# Patient Record
Sex: FEMALE | Race: WHITE | URBAN - METROPOLITAN AREA
[De-identification: names, ages, dates, MRNs, and addresses within clinical notes are randomized per-mention and may not be internally consistent; named-entity substitution may affect disease eponyms.]

---

## 2018-01-04 ENCOUNTER — APPOINTMENT (RX ONLY)
Dept: URBAN - METROPOLITAN AREA CLINIC 128 | Facility: CLINIC | Age: 63
Setting detail: DERMATOLOGY
End: 2018-01-04

## 2018-01-04 DIAGNOSIS — D69.2 OTHER NONTHROMBOCYTOPENIC PURPURA: ICD-10-CM

## 2018-01-04 DIAGNOSIS — Z71.89 OTHER SPECIFIED COUNSELING: ICD-10-CM

## 2018-01-04 DIAGNOSIS — D22 MELANOCYTIC NEVI: ICD-10-CM

## 2018-01-04 DIAGNOSIS — L57.0 ACTINIC KERATOSIS: ICD-10-CM

## 2018-01-04 DIAGNOSIS — L82.1 OTHER SEBORRHEIC KERATOSIS: ICD-10-CM

## 2018-01-04 DIAGNOSIS — I83.9 ASYMPTOMATIC VARICOSE VEINS OF LOWER EXTREMITIES: ICD-10-CM

## 2018-01-04 DIAGNOSIS — I78.8 OTHER DISEASES OF CAPILLARIES: ICD-10-CM

## 2018-01-04 DIAGNOSIS — L81.4 OTHER MELANIN HYPERPIGMENTATION: ICD-10-CM

## 2018-01-04 DIAGNOSIS — I78.1 NEVUS, NON-NEOPLASTIC: ICD-10-CM

## 2018-01-04 DIAGNOSIS — L82.0 INFLAMED SEBORRHEIC KERATOSIS: ICD-10-CM

## 2018-01-04 PROBLEM — D22.39 MELANOCYTIC NEVI OF OTHER PARTS OF FACE: Status: ACTIVE | Noted: 2018-01-04

## 2018-01-04 PROBLEM — D22.61 MELANOCYTIC NEVI OF RIGHT UPPER LIMB, INCLUDING SHOULDER: Status: ACTIVE | Noted: 2018-01-04

## 2018-01-04 PROBLEM — I83.93 ASYMPTOMATIC VARICOSE VEINS OF BILATERAL LOWER EXTREMITIES: Status: ACTIVE | Noted: 2018-01-04

## 2018-01-04 PROBLEM — D22.5 MELANOCYTIC NEVI OF TRUNK: Status: ACTIVE | Noted: 2018-01-04

## 2018-01-04 PROCEDURE — 17003 DESTRUCT PREMALG LES 2-14: CPT

## 2018-01-04 PROCEDURE — ? OTHER

## 2018-01-04 PROCEDURE — ? COUNSELING

## 2018-01-04 PROCEDURE — 99214 OFFICE O/P EST MOD 30 MIN: CPT | Mod: 25

## 2018-01-04 PROCEDURE — 17000 DESTRUCT PREMALG LESION: CPT | Mod: 59

## 2018-01-04 PROCEDURE — 17110 DESTRUCTION B9 LES UP TO 14: CPT

## 2018-01-04 PROCEDURE — ? PATIENT SPECIFIC COUNSELING

## 2018-01-04 PROCEDURE — ? LIQUID NITROGEN

## 2018-01-04 ASSESSMENT — LOCATION ZONE DERM
LOCATION ZONE: LEG
LOCATION ZONE: TRUNK
LOCATION ZONE: AXILLAE
LOCATION ZONE: FACE
LOCATION ZONE: ARM

## 2018-01-04 ASSESSMENT — LOCATION SIMPLE DESCRIPTION DERM
LOCATION SIMPLE: LEFT UPPER BACK
LOCATION SIMPLE: RIGHT AXILLARY VAULT
LOCATION SIMPLE: RIGHT UPPER BACK
LOCATION SIMPLE: INFERIOR FOREHEAD
LOCATION SIMPLE: LEFT CLAVICULAR SKIN
LOCATION SIMPLE: LEFT PRETIBIAL REGION
LOCATION SIMPLE: CHEST
LOCATION SIMPLE: RIGHT CHEEK
LOCATION SIMPLE: LEFT LOWER BACK
LOCATION SIMPLE: LEFT THIGH
LOCATION SIMPLE: RIGHT UPPER ARM
LOCATION SIMPLE: LEFT FOREARM
LOCATION SIMPLE: LEFT CHEEK
LOCATION SIMPLE: RIGHT POSTERIOR THIGH
LOCATION SIMPLE: RIGHT THIGH
LOCATION SIMPLE: RIGHT PRETIBIAL REGION
LOCATION SIMPLE: LEFT BUTTOCK
LOCATION SIMPLE: LEFT EYEBROW
LOCATION SIMPLE: ABDOMEN
LOCATION SIMPLE: RIGHT LOWER BACK
LOCATION SIMPLE: RIGHT CALF
LOCATION SIMPLE: RIGHT FOREARM
LOCATION SIMPLE: LEFT POSTERIOR THIGH
LOCATION SIMPLE: LEFT CALF
LOCATION SIMPLE: UPPER BACK

## 2018-01-04 ASSESSMENT — LOCATION DETAILED DESCRIPTION DERM
LOCATION DETAILED: LEFT CLAVICULAR SKIN
LOCATION DETAILED: RIGHT DISTAL DORSAL FOREARM
LOCATION DETAILED: INFERIOR THORACIC SPINE
LOCATION DETAILED: RIGHT CENTRAL MALAR CHEEK
LOCATION DETAILED: RIGHT PROXIMAL CALF
LOCATION DETAILED: SUPERIOR THORACIC SPINE
LOCATION DETAILED: RIGHT VENTRAL PROXIMAL FOREARM
LOCATION DETAILED: UPPER STERNUM
LOCATION DETAILED: RIGHT PROXIMAL DORSAL FOREARM
LOCATION DETAILED: LEFT LATERAL BUTTOCK
LOCATION DETAILED: LEFT MEDIAL MALAR CHEEK
LOCATION DETAILED: LEFT LATERAL EYEBROW
LOCATION DETAILED: LEFT DISTAL POSTERIOR THIGH
LOCATION DETAILED: RIGHT SUPERIOR UPPER BACK
LOCATION DETAILED: RIGHT INFERIOR CENTRAL MALAR CHEEK
LOCATION DETAILED: RIGHT DISTAL POSTERIOR THIGH
LOCATION DETAILED: LEFT PROXIMAL PRETIBIAL REGION
LOCATION DETAILED: RIGHT PROXIMAL PRETIBIAL REGION
LOCATION DETAILED: RIGHT ANTERIOR PROXIMAL UPPER ARM
LOCATION DETAILED: EPIGASTRIC SKIN
LOCATION DETAILED: LEFT MEDIAL SUPERIOR CHEST
LOCATION DETAILED: RIGHT SUPERIOR MEDIAL MIDBACK
LOCATION DETAILED: RIGHT MEDIAL UPPER BACK
LOCATION DETAILED: INFERIOR MID FOREHEAD
LOCATION DETAILED: LEFT CENTRAL MALAR CHEEK
LOCATION DETAILED: LEFT ANTERIOR DISTAL THIGH
LOCATION DETAILED: LEFT PROXIMAL DORSAL FOREARM
LOCATION DETAILED: XIPHOID
LOCATION DETAILED: RIGHT LATERAL ABDOMEN
LOCATION DETAILED: LEFT VENTRAL PROXIMAL FOREARM
LOCATION DETAILED: LEFT DISTAL CALF
LOCATION DETAILED: LEFT INFERIOR UPPER BACK
LOCATION DETAILED: LEFT DISTAL DORSAL FOREARM
LOCATION DETAILED: LEFT INFERIOR LATERAL MIDBACK
LOCATION DETAILED: RIGHT ANTERIOR DISTAL THIGH
LOCATION DETAILED: RIGHT AXILLARY VAULT
LOCATION DETAILED: RIGHT PROXIMAL LATERAL POSTERIOR THIGH

## 2018-01-04 NOTE — PROCEDURE: OTHER
Note Text (......Xxx Chief Complaint.): This diagnosis correlates with the
Other (Free Text): Discussed pulse dye laser \\nConsultation
Detail Level: Simple

## 2018-01-04 NOTE — PROCEDURE: LIQUID NITROGEN
Render Post-Care Instructions In Note?: no
Consent: The patient's consent was obtained including but not limited to risks of crusting, scabbing, blistering, scarring, darker or lighter pigmentary change, recurrence, incomplete removal and infection.
Medical Necessity Information: It is in your best interest to select a reason for this procedure from the list below. All of these items fulfill various CMS LCD requirements except the new and changing color options.
Detail Level: Detailed
Medical Necessity Clause: This procedure was medically necessary because the lesions that were treated were:
Number Of Freeze-Thaw Cycles: 3 freeze-thaw cycles
Post-Care Instructions: I reviewed with the patient in detail post-care instructions. Patient is to wear sunprotection, and avoid picking at any of the treated lesions. Pt may apply Vaseline to crusted or scabbing areas.
Duration Of Freeze Thaw-Cycle (Seconds): 0
Number Of Freeze-Thaw Cycles: 2 freeze-thaw cycles
Detail Level: Simple

## 2018-01-04 NOTE — PROCEDURE: PATIENT SPECIFIC COUNSELING
Discussed with patient that when I see pronounced telangiectasia I become concerned about systemic disorders like dermatomyositis. She denies muscle weakness upper body but does have muscle stiffness on her hands. Her PCP up [facility] did do NALLELY and it was positive she was told to possibly have lupus. She has not seen a rheumatologist yet.  Recommend that she see rheumatologist.
Detail Level: Zone

## 2019-08-05 ENCOUNTER — EVALUATION (OUTPATIENT)
Dept: PHYSICAL THERAPY | Facility: CLINIC | Age: 64
End: 2019-08-05
Payer: COMMERCIAL

## 2019-08-05 VITALS — SYSTOLIC BLOOD PRESSURE: 140 MMHG | DIASTOLIC BLOOD PRESSURE: 80 MMHG

## 2019-08-05 DIAGNOSIS — R26.89 BALANCE DISORDER: Primary | ICD-10-CM

## 2019-08-05 PROCEDURE — 97162 PT EVAL MOD COMPLEX 30 MIN: CPT | Performed by: PHYSICAL THERAPIST

## 2019-08-05 NOTE — LETTER
2019    Sophia Mendoza  [address], [place] [address]    Patient: Keyla Balderas   YOB: 1955   Date of Visit: 2019     Encounter Diagnosis     ICD-10-CM    1  Balance disorder R26 89        Dear Dr Venessa Sheffield:    Thank you for your recent referral of Keyla Balderas  Please review the attached evaluation summary from [facility] recent visit  Please verify that you agree with the plan of care by signing the attached order  If you have any questions or concerns, please do not hesitate to call  I sincerely appreciate the opportunity to share in the care of one of your patients and hope to have another opportunity to work with you in the near future  Sincerely,    Betty Varner, PT      Referring Provider:      I certify that I have read the below Plan of Care and certify the need for these services furnished under this plan of treatment while under my care  Sophia Mendoza  [address], [place] [address]  VIA Facsimile: [phone]          PT Evaluation     Today's date: 2019  Patient name: Keyla Balderas  : 1955  MRN: [id number]  Referring provider: Marge Rene  Dx:   Encounter Diagnosis     ICD-10-CM    1  Balance disorder R26 89                   Assessment  Assessment details: Keyla Balderas is a 60 y o  female who presents with pain, decreased strength, decreased sensation, impaired sensation and ambulatory dysfunction  Due to these impairments, Patient has difficulty performing a/iadls, recreational activities and engaging in social activities  Patient's clinical presentation is consistent with their referring diagnosis  Patient would benefit from skilled physical therapy to address their aforementioned impairments, improve their level of function and to improve their overall quality of life    Impairments: abnormal coordination, abnormal gait, abnormal muscle firing, abnormal or restricted ROM, abnormal movement, activity intolerance, impaired balance, impaired physical strength, lacks appropriate home exercise program, pain with function, safety issue, weight-bearing intolerance, poor posture  and poor body mechanics  Understanding of Dx/Px/POC: good   Prognosis: fair    Goals  Short Term Goals: to be achieved in 4 weeks  1) Patient to be independent with basic HEP  2) Decrease pain at it's worst to 5/10 on VAS  3) Increase LE strength by 1/2 MMT grade in all deficient planes  4) Patient to negotiate steps with a reciprocal pattern with use of HR  5) Increase ambulatory tolerance by 10 minutes  Long Term Goals: to be achieved by discharge  1) FOTO equal to or greater than predicted  2) Patient to be independent with comprehensive HEP  3) Abolish pain for improved quality of life  4) Increase LE strength to 5/5 MMT grade in all planes to improve A/IADLS  5) Patient to negotiate steps with a reciprocal pattern with use of Hr  6) Increase ambulatory tolerance to 30 minutes  Plan  Patient would benefit from: skilled PT  Planned modality interventions: biofeedback, cryotherapy, hydrotherapy, TENS, unattended electrical stimulation and low level laser therapy  Planned therapy interventions: activity modification, ADL retraining, balance, balance/weight bearing training, behavior modification, body mechanics training, functional ROM exercises, gait training, home exercise program, IADL retraining, joint mobilization, manual therapy, massage, neuromuscular re-education, patient education, strengthening, stretching, therapeutic activities, therapeutic exercise and transfer training  Frequency: 2x week  Duration in weeks: 12  Treatment plan discussed with: patient and family        Subjective Evaluation    History of Present Illness  Mechanism of injury: Pt is here today due to wounds that are on her  Foot  She was in an induced coma because she had a heart attack and pnemuonia   And during that time she had a restless leg syndrome which lead to wounds on both of her heals  She did have a right foot amputation in her toes back in March  Her podiatrist has cleared her to walk, and she now needs to begin doing this  She has been walking short distances at home, but is still having a lot of pain in her heels upon weightbearing, and also feels very shaky and weak  She reports that her balance is also off  Prior to this, she was limited with walking long distances due to her history of PAD  Recurrent probem    Quality of life: fair    Pain  At worst pain ratin    Patient Goals  Patient goal: To walk again        Objective     Strength/Myotome Testing     Left Hip   Planes of Motion   Flexion: 4    Right Hip   Planes of Motion   Flexion: 4    Left Knee   Flexion: 4  Extension: 4    Right Knee   Flexion: 4  Extension: 4    Left Ankle/Foot   Dorsiflexion: 3 (did not perform resistance due to wounds)    Right Ankle/Foot   Dorsiflexion: 3 (did not perform resistance due to wounds)    Additional Strength Details  Gait- lack of push off, decreased step and stride length  Ambulates with wc follow  Sit to stand- heavy reliance on rich UE's    Wound- did not inspect as patient reported just putting new  Bandages on them    Rich pitting edema noted; 26cm L (around malleolus); 27 cm R (around malleolus)              Precautions: hx of MI, DM type 1, PVD, fall risk (recent R toe amputation), open foot wounds      Manual                                                                                   Exercise Diary              Repeated sit to stand             Gait training             Standing hip 3 way             Side stepping             Mini squats             Pre-gait activities                                                                                                                                                                                                       Modalities

## 2019-08-05 NOTE — PROGRESS NOTES
PT Evaluation     Today's date: 2019  Patient name: Isac Wiseman  : 1955  MRN: [id number]  Referring provider: Joseph Livingston  Dx:   Encounter Diagnosis     ICD-10-CM    1  Balance disorder R26 89                   Assessment  Assessment details: Isac Wiseman is a 60 y o  female who presents with pain, decreased strength, decreased sensation, impaired sensation and ambulatory dysfunction  Due to these impairments, Patient has difficulty performing a/iadls, recreational activities and engaging in social activities  Patient's clinical presentation is consistent with their referring diagnosis  Patient would benefit from skilled physical therapy to address their aforementioned impairments, improve their level of function and to improve their overall quality of life  Impairments: abnormal coordination, abnormal gait, abnormal muscle firing, abnormal or restricted ROM, abnormal movement, activity intolerance, impaired balance, impaired physical strength, lacks appropriate home exercise program, pain with function, safety issue, weight-bearing intolerance, poor posture  and poor body mechanics  Understanding of Dx/Px/POC: good   Prognosis: fair    Goals  Short Term Goals: to be achieved in 4 weeks  1) Patient to be independent with basic HEP  2) Decrease pain at it's worst to 5/10 on VAS  3) Increase LE strength by 1/2 MMT grade in all deficient planes  4) Patient to negotiate steps with a reciprocal pattern with use of HR  5) Increase ambulatory tolerance by 10 minutes  Long Term Goals: to be achieved by discharge  1) FOTO equal to or greater than predicted  2) Patient to be independent with comprehensive HEP  3) Abolish pain for improved quality of life  4) Increase LE strength to 5/5 MMT grade in all planes to improve A/IADLS  5) Patient to negotiate steps with a reciprocal pattern with use of Hr  6) Increase ambulatory tolerance to 30 minutes        Plan  Patient would benefit from: skilled PT  Planned modality interventions: biofeedback, cryotherapy, hydrotherapy, TENS, unattended electrical stimulation and low level laser therapy  Planned therapy interventions: activity modification, ADL retraining, balance, balance/weight bearing training, behavior modification, body mechanics training, functional ROM exercises, gait training, home exercise program, IADL retraining, joint mobilization, manual therapy, massage, neuromuscular re-education, patient education, strengthening, stretching, therapeutic activities, therapeutic exercise and transfer training  Frequency: 2x week  Duration in weeks: 12  Treatment plan discussed with: patient and family        Subjective Evaluation    History of Present Illness  Mechanism of injury: Pt is here today due to wounds that are on her  Foot  She was in an induced coma because she had a heart attack and pnemuonia  And during that time she had a restless leg syndrome which lead to wounds on both of her heals  She did have a right foot amputation in her toes back in March  Her podiatrist has cleared her to walk, and she now needs to begin doing this  She has been walking short distances at home, but is still having a lot of pain in her heels upon weightbearing, and also feels very shaky and weak  She reports that her balance is also off  Prior to this, she was limited with walking long distances due to her history of PAD            Recurrent probem    Quality of life: fair    Pain  At worst pain ratin    Patient Goals  Patient goal: To walk again        Objective     Strength/Myotome Testing     Left Hip   Planes of Motion   Flexion: 4    Right Hip   Planes of Motion   Flexion: 4    Left Knee   Flexion: 4  Extension: 4    Right Knee   Flexion: 4  Extension: 4    Left Ankle/Foot   Dorsiflexion: 3 (did not perform resistance due to wounds)    Right Ankle/Foot   Dorsiflexion: 3 (did not perform resistance due to wounds)    Additional Strength Details  Gait- lack of push off, decreased step and stride length  Ambulates with wc follow  Sit to stand- heavy reliance on rich UE's    Wound- did not inspect as patient reported just putting new  Bandages on them    Rich pitting edema noted; 26cm L (around malleolus); 27 cm R (around malleolus)              Precautions: hx of MI, DM type 1, PVD, fall risk (recent R toe amputation), open foot wounds      Manual                                                                                   Exercise Diary              Repeated sit to stand             Gait training             Standing hip 3 way             Side stepping             Mini squats             Pre-gait activities                                                                                                                                                                                                       Modalities

## 2019-08-08 ENCOUNTER — OFFICE VISIT (OUTPATIENT)
Dept: PHYSICAL THERAPY | Facility: CLINIC | Age: 64
End: 2019-08-08
Payer: COMMERCIAL

## 2019-08-08 DIAGNOSIS — R26.89 BALANCE DISORDER: Primary | ICD-10-CM

## 2019-08-08 PROCEDURE — 97110 THERAPEUTIC EXERCISES: CPT | Performed by: PHYSICAL THERAPIST

## 2019-08-08 PROCEDURE — 97112 NEUROMUSCULAR REEDUCATION: CPT | Performed by: PHYSICAL THERAPIST

## 2019-08-08 PROCEDURE — 97116 GAIT TRAINING THERAPY: CPT | Performed by: PHYSICAL THERAPIST

## 2019-08-08 NOTE — PROGRESS NOTES
Daily Note     Today's date: 2019  Patient name: Elvira Litten  : 1955  MRN: [id number]  Referring provider: Ramiro Santiago  Dx:   Encounter Diagnosis     ICD-10-CM    1  Balance disorder R26 89                   Subjective: Pt notes that she has been having some issues with her blood sugar, where she went hypoglycemic twice  She took more insulin and then she went hyperglycemic  She is feeling okay today  Objective: See treatment diary below      Assessment: Tolerated treatment fair  Patient demonstrated fatigue post treatment, would benefit from continued PT and was challenged with mini squats due to muscle weakness and fatigue  She was speptical of pre-gait activities  Plan: Continue per plan of care  Progress treatment as tolerated         Precautions: hx of MI, DM type 1, PVD, fall risk (recent R toe amputation), open foot wounds      Manual                                                                                   Exercise Diary              Repeated sit to stand 10x            Gait training 7'            Standing hip 3 way 2x10 ea            Side stepping 4 laps            Mini squats 10x            Pre-gait activities 10x ea                                                                                                                                                                                                      Modalities

## 2019-08-13 ENCOUNTER — OFFICE VISIT (OUTPATIENT)
Dept: PHYSICAL THERAPY | Facility: CLINIC | Age: 64
End: 2019-08-13
Payer: COMMERCIAL

## 2019-08-13 DIAGNOSIS — R26.89 BALANCE DISORDER: Primary | ICD-10-CM

## 2019-08-13 PROCEDURE — 97112 NEUROMUSCULAR REEDUCATION: CPT | Performed by: PHYSICAL THERAPIST

## 2019-08-13 PROCEDURE — 97110 THERAPEUTIC EXERCISES: CPT | Performed by: PHYSICAL THERAPIST

## 2019-08-13 PROCEDURE — 97116 GAIT TRAINING THERAPY: CPT | Performed by: PHYSICAL THERAPIST

## 2019-08-13 NOTE — PROGRESS NOTES
Daily Note     Today's date: 2019  Patient name: Prince Aguilar  : 1955  MRN: [id number]  Referring provider: Darshan Angulo  Dx:   Encounter Diagnosis     ICD-10-CM    1  Balance disorder R26 89                   Subjective: Pt noted that she was fatigued after last session  She is doing activities in standing for 6 minutes at a time, but is unable to get further than 6 minutes due to fatigue  She also has been walking around her house a little bit without her RW  Objective: See treatment diary below      Assessment: Tolerated treatment fair  Patient demonstrated fatigue post treatment, would benefit from continued PT and was able to complete step up activities  Ambulated approximatly 30 feet x2 without AD  Pt demonstrated decreased stride length, along with decreased cadnce and lack of push off  Plan: Continue per plan of care  Progress treatment as tolerated         Precautions: hx of MI, DM type 1, PVD, fall risk (recent R toe amputation), open foot wounds      Manual                                                                                   Exercise Diary             Repeated sit to stand 10x            Gait training 7' 10'; no AD 30'x2           Standing hip 3 way 2x10 ea 20x ea           Side stepping 4 laps YTB 4 laps           Mini squats 10x 10x           Pre-gait activities 10x ea            Step up  4" 10x ea           Trampoline throw back  30x                                                                                                                                                                           Modalities

## 2019-08-15 ENCOUNTER — OFFICE VISIT (OUTPATIENT)
Dept: PHYSICAL THERAPY | Facility: CLINIC | Age: 64
End: 2019-08-15
Payer: COMMERCIAL

## 2019-08-15 DIAGNOSIS — R26.89 BALANCE DISORDER: Primary | ICD-10-CM

## 2019-08-15 PROCEDURE — 97116 GAIT TRAINING THERAPY: CPT | Performed by: PHYSICAL THERAPIST

## 2019-08-15 PROCEDURE — 97112 NEUROMUSCULAR REEDUCATION: CPT | Performed by: PHYSICAL THERAPIST

## 2019-08-15 PROCEDURE — 97110 THERAPEUTIC EXERCISES: CPT | Performed by: PHYSICAL THERAPIST

## 2019-08-15 NOTE — PROGRESS NOTES
Daily Note     Today's date: 8/15/2019  Patient name: Marvel Hyde  : 1955  MRN: [id number]  Referring provider: Tanja Spear  Dx:   Encounter Diagnosis     ICD-10-CM    1  Balance disorder R26 89                   Subjective: Pt notes that she is feeling slightly sore from last visit  Objective: See treatment diary below      Assessment: Tolerated treatment fair  Patient demonstrated fatigue post treatment, would benefit from continued PT and was able to ambulate without AD for further distances  Progressed iwth additioanl balance and stnreghtening activities with good tolerance decraease in rest breaks  Plan: Continue per plan of care  Progress treatment as tolerated         Precautions: hx of MI, DM type 1, PVD, fall risk (recent R toe amputation), open foot wounds      Manual                                                                                   Exercise Diary  8/8 8/13 8/15          Repeated sit to stand 10x            Gait training 7' 10'; no AD 30'x2 10'          Standing hip 3 way 2x10 ea 20x ea 20x ea          Side stepping 4 laps YTB 4 laps Foam 4 laps          Mini squats 10x 10x 15x          Pre-gait activities 10x ea            Step up  4" 10x ea 4" 15x ea          Trampoline throw back  30x 30x          Cone step over   4 laps          Lateral cone step over   4 laps                                                                                                                                                Modalities

## 2019-08-19 ENCOUNTER — OFFICE VISIT (OUTPATIENT)
Dept: PHYSICAL THERAPY | Facility: CLINIC | Age: 64
End: 2019-08-19
Payer: COMMERCIAL

## 2019-08-19 DIAGNOSIS — R26.89 BALANCE DISORDER: Primary | ICD-10-CM

## 2019-08-19 PROCEDURE — 97112 NEUROMUSCULAR REEDUCATION: CPT | Performed by: PHYSICAL THERAPIST

## 2019-08-19 PROCEDURE — 97110 THERAPEUTIC EXERCISES: CPT | Performed by: PHYSICAL THERAPIST

## 2019-08-19 PROCEDURE — 97116 GAIT TRAINING THERAPY: CPT | Performed by: PHYSICAL THERAPIST

## 2019-08-19 NOTE — PROGRESS NOTES
Daily Note     Today's date: 2019  Patient name: Fauzia Edwards  : 1955  MRN: [id number]  Referring provider: Jose Mayer  Dx:   Encounter Diagnosis     ICD-10-CM    1  Balance disorder R26 89                   Subjective: Pt notes that she is doing okay today, but she was very fatigued after last treatment session, she also notes an increase in numbness in her anterior shin, along with left hip pain upon standing  Objective: See treatment diary below      Assessment: Tolerated treatment fair  Patient demonstrated fatigue post treatment, would benefit from continued PT and noted slight dizziness after treatment session  BP was 138/72 post treatment session  She was educated on progression of program overall along with purpouse of each exercise  Plan: Continue per plan of care  Progress treatment as tolerated         Precautions: hx of MI, DM type 1, PVD, fall risk (recent R toe amputation), open foot wounds      Manual                                                                                   Exercise Diary  8/8 8/13 8/15 8/19         Repeated sit to stand 10x            Gait training 7' 10'; no AD 30'x2 10' 10'         Standing hip 3 way 2x10 ea 20x ea 20x ea 20x ea         Side stepping 4 laps YTB 4 laps Foam 4 laps Foam 4 laps         Mini squats 10x 10x 15x 15x         Pre-gait activities 10x ea            Step up  4" 10x ea 4" 15x ea 4"x15 ea         Trampoline throw back  30x 30x np         Cone step over   4 laps          Lateral cone step over   4 laps          Star taps    5 laps ea         Lateral step ups    15x ea         Hip ER stretch    10"x3 ea                                                                                                        Modalities

## 2019-08-20 ENCOUNTER — APPOINTMENT (OUTPATIENT)
Dept: PHYSICAL THERAPY | Facility: CLINIC | Age: 64
End: 2019-08-20
Payer: COMMERCIAL

## 2019-08-27 ENCOUNTER — APPOINTMENT (OUTPATIENT)
Dept: PHYSICAL THERAPY | Facility: CLINIC | Age: 64
End: 2019-08-27
Payer: COMMERCIAL

## 2019-08-29 ENCOUNTER — OFFICE VISIT (OUTPATIENT)
Dept: PHYSICAL THERAPY | Facility: CLINIC | Age: 64
End: 2019-08-29
Payer: COMMERCIAL

## 2019-08-29 DIAGNOSIS — R26.89 BALANCE DISORDER: Primary | ICD-10-CM

## 2019-08-29 NOTE — PROGRESS NOTES
Pt arrived to therapy in pain from a prior procedure last week  Pt states standing and weight bearing are very painful and feels therapy is going to be very challenging today  After discussing situation with supervising PT, therapy session will be held today and tomorrow as we would be limited in what we can do with balance as pt is very limited with weight bear per pain

## 2019-08-30 ENCOUNTER — APPOINTMENT (OUTPATIENT)
Dept: PHYSICAL THERAPY | Facility: CLINIC | Age: 64
End: 2019-08-30
Payer: COMMERCIAL

## 2019-09-03 ENCOUNTER — EVALUATION (OUTPATIENT)
Dept: PHYSICAL THERAPY | Facility: CLINIC | Age: 64
End: 2019-09-03
Payer: COMMERCIAL

## 2019-09-03 DIAGNOSIS — R26.89 BALANCE DISORDER: Primary | ICD-10-CM

## 2019-09-03 PROCEDURE — 97116 GAIT TRAINING THERAPY: CPT | Performed by: PHYSICAL THERAPIST

## 2019-09-03 PROCEDURE — 97112 NEUROMUSCULAR REEDUCATION: CPT | Performed by: PHYSICAL THERAPIST

## 2019-09-03 PROCEDURE — 97110 THERAPEUTIC EXERCISES: CPT | Performed by: PHYSICAL THERAPIST

## 2019-09-03 NOTE — PROGRESS NOTES
PT Re-Evaluation     Today's date: 9/3/2019  Patient name: Anika Blount  : 1955  MRN: [id number]  Referring provider: Teressa Martinez  Dx:   Encounter Diagnosis     ICD-10-CM    1  Balance disorder R26 89                   Assessment  Assessment details: Anika Blount has attended 6 visits since initiating skilled PT and has demonstrated overall improvement in mobility, strength, and function, even with a recent change in status  Currently, they have made steady progress towards their goals, but continue to remain limited with strength, endurance, and balance  At this time, skilled physical therapy is warranted in order to address their remaining impairments and aide in return to functional activities  It is recommended that they continue to be seen 2x/week for an additional 6 weeks  Thank you for this pleasant referral!     Impairments: abnormal coordination, abnormal gait, abnormal muscle firing, abnormal or restricted ROM, abnormal movement, activity intolerance, impaired balance, impaired physical strength, lacks appropriate home exercise program, pain with function, safety issue, weight-bearing intolerance, poor posture  and poor body mechanics  Understanding of Dx/Px/POC: good   Prognosis: fair    Goals  Short Term Goals: to be achieved in 4 weeks  1) Patient to be independent with basic HEP  - met  2) Decrease pain at it's worst to 5/10 on VAS  - not met  3) Increase LE strength by 1/2 MMT grade in all deficient planes  - met  4) Patient to negotiate steps with a reciprocal pattern with use of HR - not met  5) Increase ambulatory tolerance by 10 minutes  - partially met    Long Term Goals: to be achieved by discharge  1) FOTO equal to or greater than predicted  - ongoing  2) Patient to be independent with comprehensive HEP  - ongiong  3) Abolish pain for improved quality of life  - ongoing  4) Increase LE strength to 5/5 MMT grade in all planes to improve A/IADLS  - ongoing  5) Patient to negotiate steps with a reciprocal pattern with use of Hr - ongoing  6) Increase ambulatory tolerance to 30 minutes  - ongoing      Plan  Patient would benefit from: skilled PT  Planned modality interventions: biofeedback, cryotherapy, hydrotherapy, TENS, unattended electrical stimulation and low level laser therapy  Planned therapy interventions: activity modification, ADL retraining, balance, balance/weight bearing training, behavior modification, body mechanics training, functional ROM exercises, gait training, home exercise program, IADL retraining, joint mobilization, manual therapy, massage, neuromuscular re-education, patient education, strengthening, stretching, therapeutic activities, therapeutic exercise and transfer training  Frequency: 2x week  Duration in weeks: 6  Treatment plan discussed with: patient and family        Subjective Evaluation    History of Present Illness  Mechanism of injury: Pt states that she had a débridement that occurred last week and since then she has been having significant increasing difficulties in ambulation  She notes that her sugars have been not as controlled as usual and she is in more pain as well  Prior to that, she was ambulating longer distances and without her AD household distances             Recurrent probem    Quality of life: fair    Pain  At worst pain ratin    Patient Goals  Patient goal: To walk again        Objective     Strength/Myotome Testing     Left Shoulder     Planes of Motion   Flexion: 4   Abduction: 4     Right Shoulder     Planes of Motion   Flexion: 4   Abduction: 4     Left Elbow   Flexion: 4  Extension: 4    Right Elbow   Flexion: 4  Extension: 4    Left Wrist/Hand   Wrist extension: 4+  Wrist flexion: 4+    Right Wrist/Hand   Wrist extension: 4+  Wrist flexion: 4+    Left Hip   Planes of Motion   Flexion: 4+  Abduction: 4  Adduction: 4+    Right Hip   Planes of Motion   Flexion: 4+  Abduction: 4  Adduction: 4+    Left Knee   Flexion: 4+  Extension: 4+    Right Knee   Flexion: 4+  Extension: 4+    Left Ankle/Foot   Dorsiflexion: 3 (did not perform resistance due to wounds)    Right Ankle/Foot   Dorsiflexion: 3 (did not perform resistance due to wounds)    Additional Strength Details  Gait- lack of push off, decreased step and stride length   Ambulates with wc follow  Sit to stand- moderate reliance on shital UE's             Precautions: hx of MI, DM type 1, PVD, fall risk (recent R toe amputation), open foot wounds    Manual                                                                                   Exercise Diary  8/8 8/13 8/15 8/19 9/3        Repeated sit to stand 10x            Gait training 7' 10'; no AD 30'x2 10' 10' 10'        Standing hip 3 way 2x10 ea 20x ea 20x ea 20x ea 20x ea        Side stepping 4 laps YTB 4 laps Foam 4 laps Foam 4 laps         Mini squats 10x 10x 15x 15x 15x        Pre-gait activities 10x ea            Step up  4" 10x ea 4" 15x ea 4"x15 ea         Trampoline throw back  30x 30x np         Cone step over   4 laps          Lateral cone step over   4 laps          Star taps    5 laps ea         Lateral step ups    15x ea 10x ea        Hip ER stretch    10"x3 ea         Bicep  curls     2# 30x        OH press     2# 30x        Low trap lifts     RTB 20x        pball press down     5"x30                                                   Modalities

## 2019-09-03 NOTE — LETTER
2019    Javi Benton  [address], [place] [address] [place]    Patient: Fauzia Edwards   YOB: 1955   Date of Visit: 9/3/2019     Encounter Diagnosis     ICD-10-CM    1  Balance disorder R26 89        Dear Dr Her Ahr:    Thank you for your recent referral of Fauzia Edwards  Please review the attached evaluation summary from [facility] recent visit  Please verify that you agree with the plan of care by signing the attached order  If you have any questions or concerns, please do not hesitate to call  I sincerely appreciate the opportunity to share in the care of one of your patients and hope to have another opportunity to work with you in the near future  Sincerely,    Reinaldo Maravilla, PT      Referring Provider:      I certify that I have read the below Plan of Care and certify the need for these services furnished under this plan of treatment while under my care  Javi Benton  [address], [place] [address] [place] [address] [place]: [phone]          PT Re-Evaluation     Today's date: 9/3/2019  Patient name: Fauzia Edwards  : 1955  MRN: [id number]  Referring provider: Jose Mayer  Dx:   Encounter Diagnosis     ICD-10-CM    1  Balance disorder R26 89                   Assessment  Assessment details: Fauzia Edwards has attended 6 visits since initiating skilled PT and has demonstrated overall improvement in mobility, strength, and function, even with a recent change in status  Currently, they have made steady progress towards their goals, but continue to remain limited with strength, endurance, and balance  At this time, skilled physical therapy is warranted in order to address their remaining impairments and aide in return to functional activities  It is recommended that they continue to be seen 2x/week for an additional 6 weeks   Thank you for this pleasant referral!     Impairments: abnormal coordination, abnormal gait, abnormal muscle firing, abnormal or restricted ROM, abnormal movement, activity intolerance, impaired balance, impaired physical strength, lacks appropriate home exercise program, pain with function, safety issue, weight-bearing intolerance, poor posture  and poor body mechanics  Understanding of Dx/Px/POC: good   Prognosis: fair    Goals  Short Term Goals: to be achieved in 4 weeks  1) Patient to be independent with basic HEP  - met  2) Decrease pain at it's worst to 5/10 on VAS  - not met  3) Increase LE strength by 1/2 MMT grade in all deficient planes  - met  4) Patient to negotiate steps with a reciprocal pattern with use of HR - not met  5) Increase ambulatory tolerance by 10 minutes  - partially met    Long Term Goals: to be achieved by discharge  1) FOTO equal to or greater than predicted  - ongoing  2) Patient to be independent with comprehensive HEP  - ongiong  3) Abolish pain for improved quality of life  - ongoing  4) Increase LE strength to 5/5 MMT grade in all planes to improve A/IADLS  - ongoing  5) Patient to negotiate steps with a reciprocal pattern with use of Hr - ongoing  6) Increase ambulatory tolerance to 30 minutes  - ongoing      Plan  Patient would benefit from: skilled PT  Planned modality interventions: biofeedback, cryotherapy, hydrotherapy, TENS, unattended electrical stimulation and low level laser therapy  Planned therapy interventions: activity modification, ADL retraining, balance, balance/weight bearing training, behavior modification, body mechanics training, functional ROM exercises, gait training, home exercise program, IADL retraining, joint mobilization, manual therapy, massage, neuromuscular re-education, patient education, strengthening, stretching, therapeutic activities, therapeutic exercise and transfer training  Frequency: 2x week  Duration in weeks: 6  Treatment plan discussed with: patient and family        Subjective Evaluation    History of Present Illness  Mechanism of injury: Pt states that she had a débridement that occurred last week and since then she has been having significant increasing difficulties in ambulation  She notes that her sugars have been not as controlled as usual and she is in more pain as well  Prior to that, she was ambulating longer distances and without her AD household distances  Recurrent probem    Quality of life: fair    Pain  At worst pain ratin    Patient Goals  Patient goal: To walk again        Objective     Strength/Myotome Testing     Left Shoulder     Planes of Motion   Flexion: 4   Abduction: 4     Right Shoulder     Planes of Motion   Flexion: 4   Abduction: 4     Left Elbow   Flexion: 4  Extension: 4    Right Elbow   Flexion: 4  Extension: 4    Left Wrist/Hand   Wrist extension: 4+  Wrist flexion: 4+    Right Wrist/Hand   Wrist extension: 4+  Wrist flexion: 4+    Left Hip   Planes of Motion   Flexion: 4+  Abduction: 4  Adduction: 4+    Right Hip   Planes of Motion   Flexion: 4+  Abduction: 4  Adduction: 4+    Left Knee   Flexion: 4+  Extension: 4+    Right Knee   Flexion: 4+  Extension: 4+    Left Ankle/Foot   Dorsiflexion: 3 (did not perform resistance due to wounds)    Right Ankle/Foot   Dorsiflexion: 3 (did not perform resistance due to wounds)    Additional Strength Details  Gait- lack of push off, decreased step and stride length   Ambulates with wc follow  Sit to stand- moderate reliance on shital UE's             Precautions: hx of MI, DM type 1, PVD, fall risk (recent R toe amputation), open foot wounds    Manual                                                                                   Exercise Diary  8/8 8/13 8/15 8/19 9/3        Repeated sit to stand 10x            Gait training 7' 10'; no AD 30'x2 10' 10' 10'        Standing hip 3 way 2x10 ea 20x ea 20x ea 20x ea 20x ea        Side stepping 4 laps YTB 4 laps Foam 4 laps Foam 4 laps         Mini squats 10x 10x 15x 15x 15x        Pre-gait activities 10x ea Step up  4" 10x ea 4" 15x ea 4"x15 ea         Trampoline throw back  30x 30x np         Cone step over   4 laps          Lateral cone step over   4 laps          Star taps    5 laps ea         Lateral step ups    15x ea 10x ea        Hip ER stretch    10"x3 ea         Bicep  curls     2# 30x        OH press     2# 30x        Low trap lifts     RTB 20x        pball press down     5"x30                                                   Modalities

## 2019-09-04 ENCOUNTER — TRANSCRIBE ORDERS (OUTPATIENT)
Dept: PHYSICAL THERAPY | Facility: CLINIC | Age: 64
End: 2019-09-04

## 2019-09-04 DIAGNOSIS — R26.89 BALANCE DISORDER: Primary | ICD-10-CM

## 2019-09-05 ENCOUNTER — OFFICE VISIT (OUTPATIENT)
Dept: PHYSICAL THERAPY | Facility: CLINIC | Age: 64
End: 2019-09-05
Payer: COMMERCIAL

## 2019-09-05 DIAGNOSIS — R26.89 BALANCE DISORDER: Primary | ICD-10-CM

## 2019-09-05 PROCEDURE — 97110 THERAPEUTIC EXERCISES: CPT | Performed by: PHYSICAL THERAPIST

## 2019-09-05 PROCEDURE — 97112 NEUROMUSCULAR REEDUCATION: CPT | Performed by: PHYSICAL THERAPIST

## 2019-09-05 PROCEDURE — 97116 GAIT TRAINING THERAPY: CPT | Performed by: PHYSICAL THERAPIST

## 2019-09-05 NOTE — PROGRESS NOTES
Daily Note     Today's date: 2019  Patient name: Prince Aguilar  : 1955  MRN: [id number]  Referring provider: Darshan Angulo  Dx:   Encounter Diagnosis     ICD-10-CM    1  Balance disorder R26 89                   Subjective: Pt notes that she is doing okay  She was very fatigued after last treatment session and slept very well  Today she noted that her feet were "burning" mid way through the session  Objective: See treatment diary below      Assessment: Tolerated treatment fair  Patient demonstrated fatigue post treatment and would benefit from continued PT  She was very challenged with icnreased height for step up and required 2 hand assistance  Due to fatigue held some TE/NRE  Plan: Continue per plan of care  Progress treatment as tolerated         Precautions: hx of MI, DM type 1, PVD, fall risk (recent R toe amputation), open foot wounds    Manual                                                                                   Exercise Diary  8/8 8/13 8/15 8/19 9/3 9/5       Repeated sit to stand 10x            Gait training 7' 10'; no AD 30'x2 10' 10' 10' 10'       Standing hip 3 way 2x10 ea 20x ea 20x ea 20x ea 20x ea        Side stepping 4 laps YTB 4 laps Foam 4 laps Foam 4 laps  4 laps       Mini squats 10x 10x 15x 15x 15x        Pre-gait activities 10x ea            Step up  4" 10x ea 4" 15x ea 4"x15 ea  6"x5 ea       Trampoline throw back  30x 30x np         Cone step over   4 laps          Lateral cone step over   4 laps          Star taps    5 laps ea         Lateral step ups    15x ea 10x ea 10x ea       Hip ER stretch    10"x3 ea         Bicep  curls     2# 30x 3# 30x       OH press     2# 30x 2# 30x       Low trap lifts     RTB 20x RTB 30x       pball press down     5"x30        Wobble board taps      10x                                     Modalities

## 2019-09-10 ENCOUNTER — OFFICE VISIT (OUTPATIENT)
Dept: PHYSICAL THERAPY | Facility: CLINIC | Age: 64
End: 2019-09-10
Payer: COMMERCIAL

## 2019-09-10 DIAGNOSIS — R26.89 BALANCE DISORDER: Primary | ICD-10-CM

## 2019-09-10 PROCEDURE — 97530 THERAPEUTIC ACTIVITIES: CPT | Performed by: PHYSICAL THERAPIST

## 2019-09-10 PROCEDURE — 97116 GAIT TRAINING THERAPY: CPT | Performed by: PHYSICAL THERAPIST

## 2019-09-10 PROCEDURE — 97112 NEUROMUSCULAR REEDUCATION: CPT | Performed by: PHYSICAL THERAPIST

## 2019-09-10 PROCEDURE — 97110 THERAPEUTIC EXERCISES: CPT | Performed by: PHYSICAL THERAPIST

## 2019-09-10 NOTE — PROGRESS NOTES
Daily Note     Today's date: 9/10/2019  Patient name: Judd Yuan  : 1955  MRN: [id number]  Referring provider: Miroslava Galarza DPM  Dx:   Encounter Diagnosis     ICD-10-CM    1  Balance disorder R26 89                   Subjective: Pt notes that when she lays in bed at night, her legs  Are so painful that she wakes regularly thought the night and needs to get out of bed and move around  Objective: See treatment diary below      Assessment: Tolerated treatment fair  Patient demonstrated fatigue post treatment, would benefit from continued PT and was able to tolerate program today with rest breaks, but was a ble to take more "active" rest breaks where she was working her UE's in order to keep her HR elevated during the session  She was encouarged to proper her legs up during the day  Plan: Continue per plan of care  Progress treatment as tolerated         Precautions: hx of MI, DM type 1, PVD, fall risk (recent R toe amputation), open foot wounds    Manual                                                                                   Exercise Diary  8/8 8/13 8/15 8/19 9/3 9/5 9/10      Repeated sit to stand 10x            Gait training 7' 10'; no AD 30'x2 10' 10' 10' 10' 10'      Standing hip 3 way 2x10 ea 20x ea 20x ea 20x ea 20x ea  15x ea      Side stepping 4 laps YTB 4 laps Foam 4 laps Foam 4 laps  4 laps       Mini squats 10x 10x 15x 15x 15x  15x      Pre-gait activities 10x ea            Step up  4" 10x ea 4" 15x ea 4"x15 ea  6"x5 ea 4"x15 ea      Trampoline throw back  30x 30x np         Cone step over   4 laps          Lateral cone step over   4 laps    4 laps      Star taps    5 laps ea   5 laps ea      Lateral step ups    15x ea 10x ea 10x ea 10x ea      Hip ER stretch    10"x3 ea         Bicep  curls     2# 30x 3# 30x 3# 30x      OH press     2# 30x 2# 30x 3# 30x      Low trap lifts     RTB 20x RTB 30x RTB 30x      pball press down     5"x30  5"x30      Wobble board taps      10x 15x                                     Modalities

## 2019-09-12 ENCOUNTER — OFFICE VISIT (OUTPATIENT)
Dept: PHYSICAL THERAPY | Facility: CLINIC | Age: 64
End: 2019-09-12
Payer: COMMERCIAL

## 2019-09-12 DIAGNOSIS — R26.89 BALANCE DISORDER: Primary | ICD-10-CM

## 2019-09-12 PROCEDURE — 97110 THERAPEUTIC EXERCISES: CPT | Performed by: PHYSICAL THERAPIST

## 2019-09-12 PROCEDURE — 97112 NEUROMUSCULAR REEDUCATION: CPT | Performed by: PHYSICAL THERAPIST

## 2019-09-12 PROCEDURE — 97116 GAIT TRAINING THERAPY: CPT | Performed by: PHYSICAL THERAPIST

## 2019-09-12 NOTE — PROGRESS NOTES
Daily Note     Today's date: 2019  Patient name: Gilmar Macias  : 1955  MRN: [id number]  Referring provider: Marek Zee DPM  Dx:   Encounter Diagnosis     ICD-10-CM    1  Balance disorder R26 89                   Subjective: Pt notes that she was very sore after her last treatment session so today she tried not to take her pain medication prior to treatment today  Objective: See treatment diary below      Assessment: Tolerated treatment fair  Patient demonstrated fatigue post treatment, would benefit from continued PT and provided multiple rest breaks and reduced program today with good tolerance  Will assess response to today;s treatment prior to additoinal progression  Plan: Continue per plan of care  Progress treatment as tolerated         Precautions: hx of MI, DM type 1, PVD, fall risk (recent R toe amputation), open foot wounds    Manual                                                                                   Exercise Diary  8/8 8/13 8/15 8/19 9/3 9/5 9/10 9/12     Repeated sit to stand 10x            Gait training 7' 10'; no AD 30'x2 10' 10' 10' 10' 10' 8'     Standing hip 3 way 2x10 ea 20x ea 20x ea 20x ea 20x ea  15x ea 10x ea     Side stepping 4 laps YTB 4 laps Foam 4 laps Foam 4 laps  4 laps  2 laps     Mini squats 10x 10x 15x 15x 15x  15x 10x     Pre-gait activities 10x ea            Step up  4" 10x ea 4" 15x ea 4"x15 ea  6"x5 ea 4"x15 ea      Trampoline throw back  30x 30x np         Cone step over   4 laps          Lateral cone step over   4 laps    4 laps      Star taps    5 laps ea   5 laps ea 5 laps ea     Lateral step ups    15x ea 10x ea 10x ea 10x ea      Hip ER stretch    10"x3 ea         Bicep  curls     2# 30x 3# 30x 3# 30x 3# 30x     OH press     2# 30x 2# 30x 3# 30x 3# 30x     Low trap lifts     RTB 20x RTB 30x RTB 30x RTB 2-x     pball press down     5"x30  5"x30 5"x30     Wobble board taps      10x 15x  SL standing circles 10x ea Modalities

## 2019-09-16 ENCOUNTER — OFFICE VISIT (OUTPATIENT)
Dept: PHYSICAL THERAPY | Facility: CLINIC | Age: 64
End: 2019-09-16
Payer: COMMERCIAL

## 2019-09-16 DIAGNOSIS — R26.89 BALANCE DISORDER: Primary | ICD-10-CM

## 2019-09-16 PROCEDURE — 97112 NEUROMUSCULAR REEDUCATION: CPT

## 2019-09-16 PROCEDURE — 97116 GAIT TRAINING THERAPY: CPT

## 2019-09-16 PROCEDURE — 97110 THERAPEUTIC EXERCISES: CPT

## 2019-09-16 NOTE — PROGRESS NOTES
Daily Note     Today's date: 2019  Patient name: Liz Flannery  : 1955  MRN: [id number]  Referring provider: Shira Vasquez DPM  Dx:   Encounter Diagnosis     ICD-10-CM    1  Balance disorder R26 89        Start Time: 1400  Stop Time: 1435  Total time in clinic (min): 35 minutes    Subjective: Patient continues to not take her pain med prior to therapy so she can "feel the pain"  That way she can gauge her pain level so she isn't as sore the next day  Patient notes that her L foot hurts a lot today  Patient notes that showering and changing her clothes has improved since starting PT (decreased time and less breaks)  Objective: See treatment diary below    Assessment: Tolerated treatment fair  Patient continues to require multiple seated rest breaks secondary to fatigue  She seems to be improving noting subjective comments this visit  Plan: Continue per plan of care  Progress treatment as tolerated         Precautions: hx of MI, DM type 1, PVD, fall risk (recent R toe amputation), open foot wounds    Manual                                                                                 Exercise Diary  8/8 8/13 8/15 8/19 9/3 9/5 9/10 9/12 9/16    Repeated sit to stand 10x            Gait training 7' 10'; no AD 30'x2 10' 10' 10' 10' 10' 8' 8'    Standing hip 3 way 2x10 ea 20x ea 20x ea 20x ea 20x ea  15x ea 10x ea 10x ea    Side stepping 4 laps YTB 4 laps Foam 4 laps Foam 4 laps  4 laps  2 laps 2 laps     Mini squats 10x 10x 15x 15x 15x  15x 10x 10x    Pre-gait activities 10x ea            Step up  4" 10x ea 4" 15x ea 4"x15 ea  6"x5 ea 4"x15 ea      Trampoline throw back  30x 30x np         Cone step over   4 laps          Lateral cone step over   4 laps    4 laps      Star taps    5 laps ea   5 laps ea 5 laps ea 5 laps ea    Lateral step ups    15x ea 10x ea 10x ea 10x ea      Hip ER stretch    10"x3 ea         Bicep  curls     2# 30x 3# 30x 3# 30x 3# 30x 3# 30x    OH press     2# 30x 2# 30x 3# 30x 3# 30x 3# 20x    Low trap lifts     RTB 20x RTB 30x RTB 30x RTB 2-x RTB  30x    pball press down     5"x30  5"x30 5"x30 5"x30    Wobble board taps      10x 15x  SL standing circles 10x ea SL standing circles 10x ea                                Modalities

## 2019-09-17 ENCOUNTER — APPOINTMENT (OUTPATIENT)
Dept: PHYSICAL THERAPY | Facility: CLINIC | Age: 64
End: 2019-09-17
Payer: COMMERCIAL

## 2019-09-19 ENCOUNTER — APPOINTMENT (OUTPATIENT)
Dept: PHYSICAL THERAPY | Facility: CLINIC | Age: 64
End: 2019-09-19
Payer: COMMERCIAL

## 2019-09-24 ENCOUNTER — OFFICE VISIT (OUTPATIENT)
Dept: PHYSICAL THERAPY | Facility: CLINIC | Age: 64
End: 2019-09-24
Payer: COMMERCIAL

## 2019-09-24 DIAGNOSIS — R26.89 BALANCE DISORDER: Primary | ICD-10-CM

## 2019-09-24 PROCEDURE — 97110 THERAPEUTIC EXERCISES: CPT

## 2019-09-24 PROCEDURE — 97530 THERAPEUTIC ACTIVITIES: CPT

## 2019-09-24 PROCEDURE — 97112 NEUROMUSCULAR REEDUCATION: CPT

## 2019-09-24 NOTE — PROGRESS NOTES
Daily Note     Today's date: 2019  Patient name: Carmen Sheppard  : 1955  MRN: [id number]  Referring provider: Lebron Boas, DPM  Dx:   Encounter Diagnosis     ICD-10-CM    1  Balance disorder R26 89          Subjective: Patient reports pain in b/l heels of feet today  She reports she attempted to wear sneak today, unable to tolerate due to increased pain on the L heel  Objective: See treatment diary below    Assessment: Pt arrived to clinic with one sneaker on right foot and only a sock on left foot  Unable to preform walking activities today due to fear of shearing force on L foot wound due to lack of supportive footwear  Seated LE strengthening exercises added today  Pt instructed to bring in proper foot wear next session, resume full POC NV if able  Pt continues to have fair tolerance to therapeutic exercise, frequent rest breaks required due to fatigue  Pt would benefit from continued PT to improve LE strength, mobility, and overall endurance  Plan: Continue per plan of care  Progress treatment as tolerated       Pt 1:1 with PTA JW 1:00-1:30     Precautions: hx of MI, DM type 1, PVD, fall risk (recent R toe amputation), open foot wounds    Manual                                                                                 Exercise Diary  8/8 8/13 8/15 8/19 9/3 9/5 9/10 9/12 9/16 9/24   Repeated sit to stand 10x            Gait training 7' 10'; no AD 30'x2 10' 10' 10' 10' 10' 8' 8' NP   Standing hip 3 way 2x10 ea 20x ea 20x ea 20x ea 20x ea  15x ea 10x ea 10x ea 10xea   Side stepping 4 laps YTB 4 laps Foam 4 laps Foam 4 laps  4 laps  2 laps 2 laps  NP   Mini squats 10x 10x 15x 15x 15x  15x 10x 10x 20x   Pre-gait activities 10x ea            Step up  4" 10x ea 4" 15x ea 4"x15 ea  6"x5 ea 4"x15 ea   --   Trampoline throw back  30x 30x np         Cone step over   4 laps          Lateral cone step over   4 laps    4 laps      Star taps    5 laps ea   5 laps ea 5 laps ea 5 laps ea NP Lateral step ups    15x ea 10x ea 10x ea 10x ea      Hip ER stretch    10"x3 ea         Bicep  curls     2# 30x 3# 30x 3# 30x 3# 30x 3# 30x 3# 30x   OH press     2# 30x 2# 30x 3# 30x 3# 30x 3# 20x 3# 20x   Low trap lifts     RTB 20x RTB 30x RTB 30x RTB 2-x RTB  30x RTB  30x   pball press down     5"x30  5"x30 5"x30 5"x30 5"x30   Wobble board taps      10x 15x  SL standing circles 10x ea SL standing circles 10x ea NP   LAQ          2# 2x10   Marching          2# 20xea     Modalities

## 2019-09-26 ENCOUNTER — OFFICE VISIT (OUTPATIENT)
Dept: PHYSICAL THERAPY | Facility: CLINIC | Age: 64
End: 2019-09-26
Payer: COMMERCIAL

## 2019-09-26 DIAGNOSIS — R26.89 BALANCE DISORDER: Primary | ICD-10-CM

## 2019-09-26 PROCEDURE — 97110 THERAPEUTIC EXERCISES: CPT | Performed by: PHYSICAL THERAPIST

## 2019-09-26 PROCEDURE — 97112 NEUROMUSCULAR REEDUCATION: CPT | Performed by: PHYSICAL THERAPIST

## 2019-09-26 PROCEDURE — 97116 GAIT TRAINING THERAPY: CPT | Performed by: PHYSICAL THERAPIST

## 2019-09-26 NOTE — PROGRESS NOTES
Daily Note     Today's date: 2019  Patient name: Anika Blount  : 1955  MRN: [id number]  Referring provider: Teressa Martinez DPM  Dx:   Encounter Diagnosis     ICD-10-CM    1  Balance disorder R26 89          Subjective: Patient reports continued pain in her left heel  Objective: See treatment diary below    Assessment: Patient demonstrated moderate fatigue with gait training; performed exercises without c/o pain; no c/o pain at completion of treatment session  Plan: Continue per plan of care  Progress treatment as tolerated  Precautions: hx of MI, DM type 1, PVD, fall risk (recent R toe amputation), open foot wounds    Manual                                                                                 Exercise Diary  9/26 8/13 8/15 8/19 9/3 9/5 9/10 9/12 9/16 9/24   Repeated sit to stand             Gait training 125' with RW 10'; no AD 30'x2 10' 10' 10' 10' 10' 8' 8' NP   Standing hip 3 way 10x ea   20x ea 20x ea 20x ea 20x ea  15x ea 10x ea 10x ea 10xea   Side stepping  YTB 4 laps Foam 4 laps Foam 4 laps  4 laps  2 laps 2 laps  NP   Mini squats 20x 10x 15x 15x 15x  15x 10x 10x 20x   Pre-gait activities             Step up  4" 10x ea 4" 15x ea 4"x15 ea  6"x5 ea 4"x15 ea   --   Trampoline throw back  30x 30x np         Cone step over   4 laps          Lateral cone step over   4 laps    4 laps      Star taps    5 laps ea   5 laps ea 5 laps ea 5 laps ea NP   Lateral step ups    15x ea 10x ea 10x ea 10x ea      Hip ER stretch    10"x3 ea         Bicep  curls 3# 30x    2# 30x 3# 30x 3# 30x 3# 30x 3# 30x 3# 30x   OH press 3# 20x    2# 30x 2# 30x 3# 30x 3# 30x 3# 20x 3# 20x   Low trap lifts RTB  30x    RTB 20x RTB 30x RTB 30x RTB 2-x RTB  30x RTB  30x   pball press down 5"x30    5"x30  5"x30 5"x30 5"x30 5"x30   Wobble board taps      10x 15x  SL standing circles 10x ea SL standing circles 10x ea NP   LAQ 2# 20xea         2# 2x10   Marching 2# 20xea         2# 20xea     Modalities

## 2019-09-30 ENCOUNTER — OFFICE VISIT (OUTPATIENT)
Dept: PHYSICAL THERAPY | Facility: CLINIC | Age: 64
End: 2019-09-30
Payer: COMMERCIAL

## 2019-09-30 VITALS — SYSTOLIC BLOOD PRESSURE: 146 MMHG | DIASTOLIC BLOOD PRESSURE: 76 MMHG

## 2019-09-30 DIAGNOSIS — R26.89 BALANCE DISORDER: Primary | ICD-10-CM

## 2019-09-30 PROCEDURE — 97116 GAIT TRAINING THERAPY: CPT

## 2019-09-30 PROCEDURE — 97110 THERAPEUTIC EXERCISES: CPT

## 2019-09-30 PROCEDURE — 97112 NEUROMUSCULAR REEDUCATION: CPT

## 2019-09-30 NOTE — PROGRESS NOTES
Daily Note     Today's date: 2019  Patient name: Liz Flannery  : 1955  MRN: [id number]  Referring provider: Shira Vasquez DPM  Dx:   Encounter Diagnosis     ICD-10-CM    1  Balance disorder R26 89                   Subjective: Pt reports she is able to stand for approximately 3 min before feeling LBP/gluteal pain  Pt reports she does not experience LE pain in this 3 min  Objective: See treatment diary below      Assessment: Pt presenting with decreased endurance when standing and ambulating, requiring multiple seated rest breaks  Pt has difficulty in SLS during gait and navigating steps secondary to pain in b/l heel wounds  R lateral LOB while standing on foam with NBOS, however pt able to self correct  Pt demonstrated fatigue post session  Pt would benefit from continued PT in order to improve LE strength and endurance for improved gait and balance during ambulation  Plan: Continue per plan of care  Precautions: hx of MI, DM type 1, PVD, fall risk (recent R toe amputation), open foot wounds    Manual                                                                                 Exercise Diary  9/26 9/30 8/15 8/19 9/3 9/5 9/10 9/12 9/16 9/24   Repeated sit to stand             Gait training 125' with ', 61' with RW 10' 10' 10' 10' 10' 8' 8' NP   Standing hip 3 way 10x ea   x12 ea 20x ea 20x ea 20x ea  15x ea 10x ea 10x ea 10xea   Side stepping   Foam 4 laps Foam 4 laps  4 laps  2 laps 2 laps  NP   Mini squats 20x x10 15x 15x 15x  15x 10x 10x 20x   Pre-gait activities             Step up  x10 ea 4" 4" 15x ea 4"x15 ea  6"x5 ea 4"x15 ea   --   Trampoline throw back   30x np         Cone step over   4 laps          Lateral cone step over   4 laps    4 laps      Star taps    5 laps ea   5 laps ea 5 laps ea 5 laps ea NP   Lateral step ups    15x ea 10x ea 10x ea 10x ea      Hip ER stretch    10"x3 ea         Bicep  curls 3# 30x 3# x30   2# 30x 3# 30x 3# 30x 3# 30x 3# 30x 3# 30x   OH press 3# 20x 3# x20   2# 30x 2# 30x 3# 30x 3# 30x 3# 20x 3# 20x   Low trap lifts RTB  30x RTB 3x10   RTB 20x RTB 30x RTB 30x RTB 2-x RTB  30x RTB  30x   pball press down 5"x30 5"x30   5"x30  5"x30 5"x30 5"x30 5"x30   Wobble board taps      10x 15x  SL standing circles 10x ea SL standing circles 10x ea NP   LAQ 2# 20xea 2# x20 ea        2# 2x10   Marching 2# 20xea 2# x20 seated        2# 20xea   NBOS EO on foam  20"x3             Modalities

## 2019-10-03 ENCOUNTER — APPOINTMENT (OUTPATIENT)
Dept: PHYSICAL THERAPY | Facility: CLINIC | Age: 64
End: 2019-10-03
Payer: COMMERCIAL

## 2019-10-07 ENCOUNTER — APPOINTMENT (OUTPATIENT)
Dept: PHYSICAL THERAPY | Facility: CLINIC | Age: 64
End: 2019-10-07
Payer: COMMERCIAL

## 2019-10-10 ENCOUNTER — OFFICE VISIT (OUTPATIENT)
Dept: PHYSICAL THERAPY | Facility: CLINIC | Age: 64
End: 2019-10-10
Payer: COMMERCIAL

## 2019-10-10 DIAGNOSIS — R26.89 BALANCE DISORDER: Primary | ICD-10-CM

## 2019-10-10 PROCEDURE — 97116 GAIT TRAINING THERAPY: CPT

## 2019-10-10 PROCEDURE — 97110 THERAPEUTIC EXERCISES: CPT

## 2019-10-10 PROCEDURE — 97112 NEUROMUSCULAR REEDUCATION: CPT

## 2019-10-10 NOTE — PROGRESS NOTES
Daily Note     Today's date: 10/10/2019  Patient name: Clair Koenig  : 1955  MRN: [id number]  Referring provider: Tera Montague DPM  Dx:   Encounter Diagnosis     ICD-10-CM    1  Balance disorder R26 89                    Subjective: Pt expresses frustration with LE and b/l heel pain  Pt feels wounds are not healing well while ambulating on them  Objective: See treatment diary below      Assessment: Pt demonstrates low tolerance to exercise secondary to heel pain in WB position  Fatigue demonstrated post session  Pt is able to stand from w/c with use of b/l hands and no assist   Pt presents with decreased endurance  Pt BP pre session:  146/78      Plan: Continue per plan of care  Precautions: hx of MI, DM type 1, PVD, fall risk (recent R toe amputation), open foot wounds    Manual                                                                                 Exercise Diary  9/26 9/30 10/10 8/19 9/3 9/5 9/10 9/12 9/16 9/24   Repeated sit to stand             Gait training 125' with ', 61' with ', 27' with RW 10' 10' 10' 10' 8' 8' NP   Standing hip 3 way 10x ea   x12 ea x10 ea 20x ea 20x ea  15x ea 10x ea 10x ea 10xea   Side stepping    Foam 4 laps  4 laps  2 laps 2 laps  NP   Mini squats 20x x10 2x10 15x 15x  15x 10x 10x 20x   Pre-gait activities             Step up  x10 ea 4"  4"x15 ea  6"x5 ea 4"x15 ea   --   Trampoline throw back    np         Cone step over             Lateral cone step over       4 laps      Star taps    5 laps ea   5 laps ea 5 laps ea 5 laps ea NP   Lateral step ups    15x ea 10x ea 10x ea 10x ea      Hip ER stretch    10"x3 ea         Bicep  curls 3# 30x 3# x30 3# x30  2# 30x 3# 30x 3# 30x 3# 30x 3# 30x 3# 30x   OH press 3# 20x 3# x20 3# x20  2# 30x 2# 30x 3# 30x 3# 30x 3# 20x 3# 20x   Low trap lifts RTB  30x RTB 3x10 gtb x20  RTB 20x RTB 30x RTB 30x RTB 2-x RTB  30x RTB  30x   pball press down 5"x30 5"x30 5"x30  5"x30  5"x30 5"x30 5"x30 5"x30   Galilea board taps      10x 15x  SL standing circles 10x ea SL standing circles 10x ea NP   LAQ 2# 20xea 2# x20 ea 2# x20 ea       2# 2x10   Marching 2# 20xea 2# x20 seated 2# x20 seated       2# 20xea   NBOS EO on foam  20"x3 unable-pain          Seated clams   rtb x30          Tb row/ext   gtb x20 seated            Modalities

## 2019-10-14 ENCOUNTER — APPOINTMENT (OUTPATIENT)
Dept: PHYSICAL THERAPY | Facility: CLINIC | Age: 64
End: 2019-10-14
Payer: COMMERCIAL

## 2019-10-17 ENCOUNTER — APPOINTMENT (OUTPATIENT)
Dept: PHYSICAL THERAPY | Facility: CLINIC | Age: 64
End: 2019-10-17
Payer: COMMERCIAL

## 2019-10-21 ENCOUNTER — APPOINTMENT (OUTPATIENT)
Dept: PHYSICAL THERAPY | Facility: CLINIC | Age: 64
End: 2019-10-21
Payer: COMMERCIAL

## 2019-10-24 ENCOUNTER — APPOINTMENT (OUTPATIENT)
Dept: PHYSICAL THERAPY | Facility: CLINIC | Age: 64
End: 2019-10-24
Payer: COMMERCIAL

## 2019-10-28 ENCOUNTER — APPOINTMENT (OUTPATIENT)
Dept: PHYSICAL THERAPY | Facility: CLINIC | Age: 64
End: 2019-10-28
Payer: COMMERCIAL

## 2019-10-31 ENCOUNTER — APPOINTMENT (OUTPATIENT)
Dept: PHYSICAL THERAPY | Facility: CLINIC | Age: 64
End: 2019-10-31
Payer: COMMERCIAL

## 2020-03-23 NOTE — PROGRESS NOTES
PT DISCHARGE:     Administratively discharging patient; primary physical therapist no longer with company  Patient failed to return to therapy

## 2020-07-16 ENCOUNTER — APPOINTMENT (RX ONLY)
Dept: URBAN - METROPOLITAN AREA CLINIC 128 | Facility: CLINIC | Age: 65
Setting detail: DERMATOLOGY
End: 2020-07-16

## 2020-07-16 DIAGNOSIS — L82.1 OTHER SEBORRHEIC KERATOSIS: ICD-10-CM

## 2020-07-16 DIAGNOSIS — D22 MELANOCYTIC NEVI: ICD-10-CM

## 2020-07-16 DIAGNOSIS — L81.4 OTHER MELANIN HYPERPIGMENTATION: ICD-10-CM

## 2020-07-16 DIAGNOSIS — I78.8 OTHER DISEASES OF CAPILLARIES: ICD-10-CM

## 2020-07-16 DIAGNOSIS — I83.9 ASYMPTOMATIC VARICOSE VEINS OF LOWER EXTREMITIES: ICD-10-CM

## 2020-07-16 DIAGNOSIS — Z71.89 OTHER SPECIFIED COUNSELING: ICD-10-CM

## 2020-07-16 DIAGNOSIS — L72.8 OTHER FOLLICULAR CYSTS OF THE SKIN AND SUBCUTANEOUS TISSUE: ICD-10-CM

## 2020-07-16 DIAGNOSIS — I78.1 NEVUS, NON-NEOPLASTIC: ICD-10-CM

## 2020-07-16 PROBLEM — I83.93 ASYMPTOMATIC VARICOSE VEINS OF BILATERAL LOWER EXTREMITIES: Status: ACTIVE | Noted: 2020-07-16

## 2020-07-16 PROBLEM — D22.62 MELANOCYTIC NEVI OF LEFT UPPER LIMB, INCLUDING SHOULDER: Status: ACTIVE | Noted: 2020-07-16

## 2020-07-16 PROBLEM — D22.39 MELANOCYTIC NEVI OF OTHER PARTS OF FACE: Status: ACTIVE | Noted: 2020-07-16

## 2020-07-16 PROBLEM — D22.5 MELANOCYTIC NEVI OF TRUNK: Status: ACTIVE | Noted: 2020-07-16

## 2020-07-16 PROBLEM — D22.71 MELANOCYTIC NEVI OF RIGHT LOWER LIMB, INCLUDING HIP: Status: ACTIVE | Noted: 2020-07-16

## 2020-07-16 PROBLEM — D22.61 MELANOCYTIC NEVI OF RIGHT UPPER LIMB, INCLUDING SHOULDER: Status: ACTIVE | Noted: 2020-07-16

## 2020-07-16 PROCEDURE — 99214 OFFICE O/P EST MOD 30 MIN: CPT

## 2020-07-16 PROCEDURE — ? COUNSELING

## 2020-07-16 ASSESSMENT — LOCATION DETAILED DESCRIPTION DERM
LOCATION DETAILED: RIGHT INFERIOR LATERAL MIDBACK
LOCATION DETAILED: RIGHT PROXIMAL PRETIBIAL REGION
LOCATION DETAILED: LEFT MEDIAL SUPERIOR CHEST
LOCATION DETAILED: LEFT DISTAL POSTERIOR THIGH
LOCATION DETAILED: RIGHT ANTERIOR SHOULDER
LOCATION DETAILED: LEFT DISTAL CALF
LOCATION DETAILED: LEFT INFERIOR UPPER BACK
LOCATION DETAILED: RIGHT LATERAL DISTAL PRETIBIAL REGION
LOCATION DETAILED: RIGHT ANTERIOR PROXIMAL THIGH
LOCATION DETAILED: RIGHT LATERAL SUPERIOR CHEST
LOCATION DETAILED: INFERIOR THORACIC SPINE
LOCATION DETAILED: LEFT ANTERIOR SHOULDER
LOCATION DETAILED: LEFT POSTERIOR SHOULDER
LOCATION DETAILED: RIGHT VENTRAL DISTAL FOREARM
LOCATION DETAILED: SUPERIOR THORACIC SPINE
LOCATION DETAILED: RIGHT PROXIMAL POSTERIOR THIGH
LOCATION DETAILED: RIGHT POSTERIOR SHOULDER
LOCATION DETAILED: LEFT CENTRAL MALAR CHEEK
LOCATION DETAILED: LEFT SUPERIOR LATERAL BUCCAL CHEEK
LOCATION DETAILED: LEFT LATERAL ABDOMEN
LOCATION DETAILED: LEFT DISTAL PRETIBIAL REGION
LOCATION DETAILED: RIGHT PROXIMAL DORSAL FOREARM
LOCATION DETAILED: RIGHT POSTERIOR ANKLE
LOCATION DETAILED: RIGHT VENTRAL PROXIMAL FOREARM
LOCATION DETAILED: LEFT DISTAL DORSAL FOREARM
LOCATION DETAILED: LEFT ANTERIOR DISTAL THIGH
LOCATION DETAILED: RIGHT CENTRAL MALAR CHEEK
LOCATION DETAILED: RIGHT MID-UPPER BACK
LOCATION DETAILED: UPPER STERNUM
LOCATION DETAILED: EPIGASTRIC SKIN
LOCATION DETAILED: LEFT PROXIMAL PRETIBIAL REGION
LOCATION DETAILED: RIGHT DISTAL PRETIBIAL REGION
LOCATION DETAILED: RIGHT INFERIOR CENTRAL MALAR CHEEK
LOCATION DETAILED: RIGHT DISTAL POSTERIOR THIGH
LOCATION DETAILED: LEFT ANTERIOR PROXIMAL THIGH
LOCATION DETAILED: LEFT MID-UPPER BACK
LOCATION DETAILED: LOWER STERNUM
LOCATION DETAILED: SUBXIPHOID
LOCATION DETAILED: LEFT VENTRAL PROXIMAL FOREARM
LOCATION DETAILED: RIGHT ANTERIOR DISTAL THIGH
LOCATION DETAILED: LEFT POSTERIOR ANKLE
LOCATION DETAILED: LEFT INFERIOR LATERAL MIDBACK
LOCATION DETAILED: LEFT PROXIMAL POSTERIOR UPPER ARM
LOCATION DETAILED: RIGHT DISTAL CALF
LOCATION DETAILED: RIGHT PROXIMAL CALF

## 2020-07-16 ASSESSMENT — LOCATION SIMPLE DESCRIPTION DERM
LOCATION SIMPLE: RIGHT UPPER BACK
LOCATION SIMPLE: RIGHT FOREARM
LOCATION SIMPLE: RIGHT ANKLE
LOCATION SIMPLE: RIGHT CHEEK
LOCATION SIMPLE: CHEST
LOCATION SIMPLE: RIGHT POSTERIOR THIGH
LOCATION SIMPLE: RIGHT PRETIBIAL REGION
LOCATION SIMPLE: LEFT LOWER BACK
LOCATION SIMPLE: LEFT FOREARM
LOCATION SIMPLE: LEFT SHOULDER
LOCATION SIMPLE: LEFT CALF
LOCATION SIMPLE: RIGHT THIGH
LOCATION SIMPLE: LEFT THIGH
LOCATION SIMPLE: RIGHT SHOULDER
LOCATION SIMPLE: LEFT PRETIBIAL REGION
LOCATION SIMPLE: ABDOMEN
LOCATION SIMPLE: LEFT CHEEK
LOCATION SIMPLE: RIGHT CALF
LOCATION SIMPLE: LEFT POSTERIOR UPPER ARM
LOCATION SIMPLE: LEFT ANKLE
LOCATION SIMPLE: LEFT POSTERIOR THIGH
LOCATION SIMPLE: UPPER BACK
LOCATION SIMPLE: LEFT UPPER BACK
LOCATION SIMPLE: RIGHT LOWER BACK

## 2020-07-16 ASSESSMENT — LOCATION ZONE DERM
LOCATION ZONE: LEG
LOCATION ZONE: TRUNK
LOCATION ZONE: FACE
LOCATION ZONE: ARM

## 2020-10-19 ENCOUNTER — APPOINTMENT (RX ONLY)
Dept: URBAN - METROPOLITAN AREA CLINIC 128 | Facility: CLINIC | Age: 65
Setting detail: DERMATOLOGY
End: 2020-10-19

## 2020-10-19 DIAGNOSIS — D22 MELANOCYTIC NEVI: ICD-10-CM

## 2020-10-19 DIAGNOSIS — Z71.89 OTHER SPECIFIED COUNSELING: ICD-10-CM

## 2020-10-19 DIAGNOSIS — L82.0 INFLAMED SEBORRHEIC KERATOSIS: ICD-10-CM

## 2020-10-19 PROBLEM — D22.39 MELANOCYTIC NEVI OF OTHER PARTS OF FACE: Status: ACTIVE | Noted: 2020-10-19

## 2020-10-19 PROCEDURE — 17110 DESTRUCTION B9 LES UP TO 14: CPT

## 2020-10-19 PROCEDURE — ? MEDICARE ABN

## 2020-10-19 PROCEDURE — 99213 OFFICE O/P EST LOW 20 MIN: CPT | Mod: 25

## 2020-10-19 PROCEDURE — ? COUNSELING

## 2020-10-19 PROCEDURE — ? LIQUID NITROGEN

## 2020-10-19 ASSESSMENT — LOCATION DETAILED DESCRIPTION DERM
LOCATION DETAILED: LEFT CENTRAL EYEBROW
LOCATION DETAILED: LEFT MEDIAL MALAR CHEEK

## 2020-10-19 ASSESSMENT — LOCATION ZONE DERM: LOCATION ZONE: FACE

## 2020-10-19 ASSESSMENT — LOCATION SIMPLE DESCRIPTION DERM
LOCATION SIMPLE: LEFT CHEEK
LOCATION SIMPLE: LEFT EYEBROW

## 2020-10-19 NOTE — PROCEDURE: LIQUID NITROGEN
Detail Level: Detailed
Add 52 Modifier (Optional): no
Medical Necessity Clause: This procedure was medically necessary because the lesions that were treated were:
Medical Necessity Information: It is in your best interest to select a reason for this procedure from the list below. All of these items fulfill various CMS LCD requirements except the new and changing color options.
Number Of Freeze-Thaw Cycles: 3 freeze-thaw cycles
Post-Care Instructions: I reviewed with the patient in detail post-care instructions. Patient is to wear sunprotection, and avoid picking at any of the treated lesions. Pt may apply Vaseline to crusted or scabbing areas.
Consent: The patient's consent was obtained including but not limited to risks of crusting, scabbing, blistering, scarring, darker or lighter pigmentary change, recurrence, incomplete removal and infection.

## 2020-10-19 NOTE — PROCEDURE: MEDICARE ABN
Detail Level: Detailed
Payment Option: Option 1: Bill Medicare, await for decision on payment.
Procedure (Limit To 20 Characters): LN2
Reason?: Additional Information
Reason?: non-covered service

## 2021-10-20 ENCOUNTER — APPOINTMENT (RX ONLY)
Dept: URBAN - METROPOLITAN AREA CLINIC 128 | Facility: CLINIC | Age: 66
Setting detail: DERMATOLOGY
End: 2021-10-20

## 2021-10-20 DIAGNOSIS — D49.2 NEOPLASM OF UNSPECIFIED BEHAVIOR OF BONE, SOFT TISSUE, AND SKIN: ICD-10-CM

## 2021-10-20 DIAGNOSIS — L82.1 OTHER SEBORRHEIC KERATOSIS: ICD-10-CM

## 2021-10-20 DIAGNOSIS — L81.4 OTHER MELANIN HYPERPIGMENTATION: ICD-10-CM

## 2021-10-20 DIAGNOSIS — I78.1 NEVUS, NON-NEOPLASTIC: ICD-10-CM

## 2021-10-20 DIAGNOSIS — D22 MELANOCYTIC NEVI: ICD-10-CM

## 2021-10-20 DIAGNOSIS — L57.0 ACTINIC KERATOSIS: ICD-10-CM

## 2021-10-20 DIAGNOSIS — I78.8 OTHER DISEASES OF CAPILLARIES: ICD-10-CM

## 2021-10-20 DIAGNOSIS — R60.0 LOCALIZED EDEMA: ICD-10-CM

## 2021-10-20 DIAGNOSIS — L81.5 LEUKODERMA, NOT ELSEWHERE CLASSIFIED: ICD-10-CM

## 2021-10-20 DIAGNOSIS — R233 SPONTANEOUS ECCHYMOSES: ICD-10-CM

## 2021-10-20 DIAGNOSIS — L72.8 OTHER FOLLICULAR CYSTS OF THE SKIN AND SUBCUTANEOUS TISSUE: ICD-10-CM

## 2021-10-20 DIAGNOSIS — I73.00 RAYNAUD'S SYNDROME WITHOUT GANGRENE: ICD-10-CM

## 2021-10-20 DIAGNOSIS — L82.0 INFLAMED SEBORRHEIC KERATOSIS: ICD-10-CM

## 2021-10-20 PROBLEM — D22.39 MELANOCYTIC NEVI OF OTHER PARTS OF FACE: Status: ACTIVE | Noted: 2021-10-20

## 2021-10-20 PROBLEM — D22.5 MELANOCYTIC NEVI OF TRUNK: Status: ACTIVE | Noted: 2021-10-20

## 2021-10-20 PROBLEM — S50.12XA CONTUSION OF LEFT FOREARM, INITIAL ENCOUNTER: Status: ACTIVE | Noted: 2021-10-20

## 2021-10-20 PROCEDURE — ? SUNSCREEN RECOMMENDATIONS

## 2021-10-20 PROCEDURE — ? BIOPSY BY SHAVE METHOD

## 2021-10-20 PROCEDURE — ? COUNSELING

## 2021-10-20 PROCEDURE — 17000 DESTRUCT PREMALG LESION: CPT | Mod: 59

## 2021-10-20 PROCEDURE — ? LIQUID NITROGEN

## 2021-10-20 PROCEDURE — 99213 OFFICE O/P EST LOW 20 MIN: CPT | Mod: 25

## 2021-10-20 PROCEDURE — 17110 DESTRUCTION B9 LES UP TO 14: CPT

## 2021-10-20 PROCEDURE — 11102 TANGNTL BX SKIN SINGLE LES: CPT | Mod: 59

## 2021-10-20 ASSESSMENT — LOCATION SIMPLE DESCRIPTION DERM
LOCATION SIMPLE: LEFT HAND
LOCATION SIMPLE: LEFT FOREARM
LOCATION SIMPLE: LEFT UPPER ARM
LOCATION SIMPLE: RIGHT UPPER ARM
LOCATION SIMPLE: RIGHT MIDDLE FINGER
LOCATION SIMPLE: LEFT CALF
LOCATION SIMPLE: LEFT UPPER BACK
LOCATION SIMPLE: UPPER BACK
LOCATION SIMPLE: RIGHT POSTERIOR UPPER ARM
LOCATION SIMPLE: RIGHT LOWER BACK
LOCATION SIMPLE: RIGHT CHEEK
LOCATION SIMPLE: RIGHT BACK
LOCATION SIMPLE: LEFT POSTERIOR UPPER ARM
LOCATION SIMPLE: RIGHT PRETIBIAL REGION
LOCATION SIMPLE: RIGHT THIGH
LOCATION SIMPLE: RIGHT ELBOW
LOCATION SIMPLE: RIGHT ANKLE
LOCATION SIMPLE: LEFT TEMPLE
LOCATION SIMPLE: LEFT THIGH
LOCATION SIMPLE: LEFT BREAST
LOCATION SIMPLE: LEFT CHEEK
LOCATION SIMPLE: RIGHT FOREHEAD
LOCATION SIMPLE: RIGHT UPPER BACK
LOCATION SIMPLE: RIGHT CALF
LOCATION SIMPLE: POSTERIOR SCALP
LOCATION SIMPLE: ABDOMEN
LOCATION SIMPLE: LEFT PRETIBIAL REGION
LOCATION SIMPLE: LEFT FOREHEAD

## 2021-10-20 ASSESSMENT — LOCATION DETAILED DESCRIPTION DERM
LOCATION DETAILED: RIGHT SUPERIOR LATERAL MIDBACK
LOCATION DETAILED: RIGHT RIB CAGE
LOCATION DETAILED: LEFT INFERIOR CENTRAL MALAR CHEEK
LOCATION DETAILED: LEFT PROXIMAL ULNAR DORSAL FOREARM
LOCATION DETAILED: RIGHT MID-UPPER BACK
LOCATION DETAILED: RIGHT INFERIOR MEDIAL FOREHEAD
LOCATION DETAILED: RIGHT LATERAL ABDOMEN
LOCATION DETAILED: LEFT CENTRAL MALAR CHEEK
LOCATION DETAILED: SUPERIOR THORACIC SPINE
LOCATION DETAILED: PERIUMBILICAL SKIN
LOCATION DETAILED: LEFT PROXIMAL CALF
LOCATION DETAILED: RIGHT DISTAL PRETIBIAL REGION
LOCATION DETAILED: RIGHT DISTAL POSTERIOR UPPER ARM
LOCATION DETAILED: RIGHT INFERIOR CENTRAL MALAR CHEEK
LOCATION DETAILED: RIGHT PROXIMAL PRETIBIAL REGION
LOCATION DETAILED: RIGHT MEDIAL POSTERIOR ANKLE
LOCATION DETAILED: LEFT MEDIAL BREAST 7-8:00 REGION
LOCATION DETAILED: LEFT MID-UPPER BACK
LOCATION DETAILED: LEFT LATERAL BREAST 5-6:00 REGION
LOCATION DETAILED: LEFT ANTERIOR DISTAL THIGH
LOCATION DETAILED: LEFT PROXIMAL DORSAL FOREARM
LOCATION DETAILED: MID-OCCIPITAL SCALP
LOCATION DETAILED: RIGHT ANTERIOR PROXIMAL UPPER ARM
LOCATION DETAILED: LEFT INFERIOR UPPER BACK
LOCATION DETAILED: RIGHT SUPERIOR LATERAL UPPER BACK
LOCATION DETAILED: LEFT PROXIMAL POSTERIOR UPPER ARM
LOCATION DETAILED: LEFT DISTAL PRETIBIAL REGION
LOCATION DETAILED: RIGHT ELBOW
LOCATION DETAILED: RIGHT DISTAL CALF
LOCATION DETAILED: LEFT ANTERIOR LATERAL PROXIMAL UPPER ARM
LOCATION DETAILED: RIGHT ANTERIOR MEDIAL DISTAL UPPER ARM
LOCATION DETAILED: LEFT CENTRAL TEMPLE
LOCATION DETAILED: LEFT ULNAR DORSAL HAND
LOCATION DETAILED: LEFT RIB CAGE
LOCATION DETAILED: LEFT MEDIAL FOREHEAD
LOCATION DETAILED: RIGHT PROXIMAL RADIAL DORSAL MIDDLE FINGER
LOCATION DETAILED: LEFT SUPERIOR UPPER BACK
LOCATION DETAILED: RIGHT ANTERIOR LATERAL PROXIMAL THIGH

## 2021-10-20 ASSESSMENT — LOCATION ZONE DERM
LOCATION ZONE: TRUNK
LOCATION ZONE: FACE
LOCATION ZONE: HAND
LOCATION ZONE: ARM
LOCATION ZONE: LEG
LOCATION ZONE: FINGER
LOCATION ZONE: SCALP

## 2021-10-20 NOTE — PROCEDURE: BIOPSY BY SHAVE METHOD
Detail Level: Simple
Depth Of Biopsy: dermis
Was A Bandage Applied: Yes
Size Of Lesion In Cm: 0.6
X Size Of Lesion In Cm: 0.5
Biopsy Type: H and E
Biopsy Method: Personna blade
Anesthesia Type: 1% lidocaine with epinephrine
Additional Anesthesia Volume In Cc (Will Not Render If 0): 0
Hemostasis: Aluminum Chloride and Electrocautery
Wound Care: Vaseline
Dressing: bandage
Destruction After The Procedure: No
Type Of Destruction Used: Curettage
Curettage Text: The wound bed was treated with curettage after the biopsy was performed.
Cryotherapy Text: The wound bed was treated with cryotherapy after the biopsy was performed.
Electrodesiccation Text: The wound bed was treated with electrodesiccation after the biopsy was performed.
Electrodesiccation And Curettage Text: The wound bed was treated with electrodesiccation and curettage after the biopsy was performed.
Silver Nitrate Text: The wound bed was treated with silver nitrate after the biopsy was performed.
Lab: [facility]
Lab Facility: [address]
Consent: The provider's intent is to remove a portion of the lesion solely for diagnostic purposes. Written consent to was obtained and risks were reviewed including but not limited to scarring, infection, bleeding, scabbing, incomplete removal, nerve damage and allergy to anesthesia. The area was prepped with Alcohol. Local anesthesia was obtained with approximately 0.5cc of 1% lidocaine with epinephrine. A biopsy by shave method to the level of the dermis (sent for H and E) was performed using a double edge Personna blade, aluminum Chloride was used for hemostasis. Following the biopsy Petrolatum and a bandage were applied. Patient will be notified of biopsy results. However, patient instructed to call the office if not contacted within 2 weeks.
Post-Care Instructions: I reviewed with the patient in detail post-care instructions. Patient is to keep the biopsy site dry overnight, and then apply bacitracin twice daily until healed. Patient may apply hydrogen peroxide soaks to remove any crusting.
Notification Instructions: Patient will be notified of biopsy results. However, patient instructed to call the office if not contacted within 2 weeks.
Billing Type: [employer]
Information: Selecting Yes will display possible errors in your note based on the variables you have selected. This validation is only offered as a suggestion for you. PLEASE NOTE THAT THE VALIDATION TEXT WILL BE REMOVED WHEN YOU FINALIZE YOUR NOTE. IF YOU WANT TO FAX A PRELIMINARY NOTE YOU WILL NEED TO TOGGLE THIS TO 'NO' IF YOU DO NOT WANT IT IN YOUR FAXED NOTE.

## 2021-10-20 NOTE — PROCEDURE: LIQUID NITROGEN
Consent: The patient's consent was obtained including but not limited to risks of crusting, scabbing, blistering, scarring, darker or lighter pigmentary change, recurrence, incomplete removal and infection.
Include Z78.9 (Other Specified Conditions Influencing Health Status) As An Associated Diagnosis?: No
Detail Level: Detailed
Number Of Freeze-Thaw Cycles: 3 freeze-thaw cycles
Show Aperture Variable?: Yes
Medical Necessity Clause: This procedure was medically necessary because the lesions that were treated were:
Medical Necessity Information: It is in your best interest to select a reason for this procedure from the list below. All of these items fulfill various CMS LCD requirements except the new and changing color options.
Post-Care Instructions: I reviewed with the patient in detail post-care instructions. Patient is to wear sunprotection, and avoid picking at any of the treated lesions. Pt may apply Vaseline to crusted or scabbing areas.
Duration Of Freeze Thaw-Cycle (Seconds): 2
Detail Level: Simple
Number Of Freeze-Thaw Cycles: 2 freeze-thaw cycles

## 2021-11-04 ENCOUNTER — APPOINTMENT (RX ONLY)
Dept: URBAN - METROPOLITAN AREA CLINIC 128 | Facility: CLINIC | Age: 66
Setting detail: DERMATOLOGY
End: 2021-11-04

## 2021-11-04 DIAGNOSIS — L82.1 OTHER SEBORRHEIC KERATOSIS: ICD-10-CM

## 2021-11-04 DIAGNOSIS — L81.4 OTHER MELANIN HYPERPIGMENTATION: ICD-10-CM

## 2021-11-04 DIAGNOSIS — L56.5 DISSEMINATED SUPERFICIAL ACTINIC POROKERATOSIS (DSAP): ICD-10-CM

## 2021-11-04 PROCEDURE — 17000 DESTRUCT PREMALG LESION: CPT

## 2021-11-04 PROCEDURE — ? COUNSELING

## 2021-11-04 PROCEDURE — ? SUNSCREEN RECOMMENDATIONS

## 2021-11-04 PROCEDURE — ? LIQUID NITROGEN

## 2021-11-04 PROCEDURE — 99213 OFFICE O/P EST LOW 20 MIN: CPT | Mod: 25

## 2021-11-04 ASSESSMENT — LOCATION SIMPLE DESCRIPTION DERM
LOCATION SIMPLE: RIGHT PRETIBIAL REGION
LOCATION SIMPLE: LEFT PRETIBIAL REGION
LOCATION SIMPLE: RIGHT ANKLE

## 2021-11-04 ASSESSMENT — LOCATION DETAILED DESCRIPTION DERM
LOCATION DETAILED: LEFT PROXIMAL PRETIBIAL REGION
LOCATION DETAILED: RIGHT MEDIAL POSTERIOR ANKLE
LOCATION DETAILED: RIGHT DISTAL PRETIBIAL REGION

## 2021-11-04 ASSESSMENT — LOCATION ZONE DERM: LOCATION ZONE: LEG

## 2022-05-09 ENCOUNTER — APPOINTMENT (RX ONLY)
Dept: URBAN - METROPOLITAN AREA CLINIC 128 | Facility: CLINIC | Age: 67
Setting detail: DERMATOLOGY
End: 2022-05-09

## 2022-05-09 DIAGNOSIS — L0390 CELLULITIS AND ABSCESS OF UNSPECIFIED SITES: ICD-10-CM

## 2022-05-09 DIAGNOSIS — L81.5 LEUKODERMA, NOT ELSEWHERE CLASSIFIED: ICD-10-CM

## 2022-05-09 DIAGNOSIS — L82.1 OTHER SEBORRHEIC KERATOSIS: ICD-10-CM

## 2022-05-09 DIAGNOSIS — L0391 CELLULITIS AND ABSCESS OF UNSPECIFIED SITES: ICD-10-CM

## 2022-05-09 DIAGNOSIS — D22 MELANOCYTIC NEVI: ICD-10-CM

## 2022-05-09 DIAGNOSIS — I83.9 ASYMPTOMATIC VARICOSE VEINS OF LOWER EXTREMITIES: ICD-10-CM

## 2022-05-09 DIAGNOSIS — L81.4 OTHER MELANIN HYPERPIGMENTATION: ICD-10-CM

## 2022-05-09 DIAGNOSIS — I78.8 OTHER DISEASES OF CAPILLARIES: ICD-10-CM

## 2022-05-09 DIAGNOSIS — L90.5 SCAR CONDITIONS AND FIBROSIS OF SKIN: ICD-10-CM

## 2022-05-09 PROBLEM — D22.5 MELANOCYTIC NEVI OF TRUNK: Status: ACTIVE | Noted: 2022-05-09

## 2022-05-09 PROBLEM — L03.116 CELLULITIS OF LEFT LOWER LIMB: Status: ACTIVE | Noted: 2022-05-09

## 2022-05-09 PROBLEM — D22.71 MELANOCYTIC NEVI OF RIGHT LOWER LIMB, INCLUDING HIP: Status: ACTIVE | Noted: 2022-05-09

## 2022-05-09 PROBLEM — I83.92 ASYMPTOMATIC VARICOSE VEINS OF LEFT LOWER EXTREMITY: Status: ACTIVE | Noted: 2022-05-09

## 2022-05-09 PROCEDURE — ? COUNSELING

## 2022-05-09 PROCEDURE — ? SUNSCREEN RECOMMENDATIONS

## 2022-05-09 PROCEDURE — 99213 OFFICE O/P EST LOW 20 MIN: CPT

## 2022-05-09 PROCEDURE — ? ADDITIONAL NOTES

## 2022-05-09 PROCEDURE — ? DEFER

## 2022-05-09 ASSESSMENT — LOCATION SIMPLE DESCRIPTION DERM
LOCATION SIMPLE: CHEST
LOCATION SIMPLE: LEFT BREAST
LOCATION SIMPLE: RIGHT BACK
LOCATION SIMPLE: RIGHT PRETIBIAL REGION
LOCATION SIMPLE: RIGHT CALF
LOCATION SIMPLE: RIGHT UPPER ARM
LOCATION SIMPLE: LEFT CHEEK
LOCATION SIMPLE: RIGHT ELBOW
LOCATION SIMPLE: LEFT HAND
LOCATION SIMPLE: LEFT UPPER BACK
LOCATION SIMPLE: POSTERIOR SCALP
LOCATION SIMPLE: ABDOMEN
LOCATION SIMPLE: LEFT CALF
LOCATION SIMPLE: RIGHT THIGH
LOCATION SIMPLE: LEFT TEMPLE
LOCATION SIMPLE: RIGHT BREAST
LOCATION SIMPLE: RIGHT LOWER BACK
LOCATION SIMPLE: LEFT THIGH
LOCATION SIMPLE: LEFT FOREARM
LOCATION SIMPLE: LEFT POSTERIOR UPPER ARM
LOCATION SIMPLE: LEFT PRETIBIAL REGION
LOCATION SIMPLE: RIGHT UPPER BACK
LOCATION SIMPLE: UPPER BACK
LOCATION SIMPLE: LEFT SHOULDER
LOCATION SIMPLE: LEFT UPPER ARM
LOCATION SIMPLE: RIGHT CHEEK
LOCATION SIMPLE: NECK
LOCATION SIMPLE: RIGHT FOREARM
LOCATION SIMPLE: RIGHT SHOULDER
LOCATION SIMPLE: RIGHT POSTERIOR UPPER ARM
LOCATION SIMPLE: RIGHT CLAVICULAR SKIN
LOCATION SIMPLE: RIGHT HAND
LOCATION SIMPLE: RIGHT FOREHEAD

## 2022-05-09 ASSESSMENT — LOCATION DETAILED DESCRIPTION DERM
LOCATION DETAILED: LEFT ANTERIOR SHOULDER
LOCATION DETAILED: LEFT ANTERIOR PROXIMAL THIGH
LOCATION DETAILED: LEFT CENTRAL LATERAL NECK
LOCATION DETAILED: LEFT DISTAL PRETIBIAL REGION
LOCATION DETAILED: RIGHT INFERIOR CENTRAL MALAR CHEEK
LOCATION DETAILED: LEFT POSTERIOR SHOULDER
LOCATION DETAILED: LEFT LATERAL BREAST 5-6:00 REGION
LOCATION DETAILED: RIGHT LATERAL SUPERIOR CHEST
LOCATION DETAILED: RIGHT PROXIMAL CALF
LOCATION DETAILED: LEFT VENTRAL DISTAL FOREARM
LOCATION DETAILED: LEFT MEDIAL MALAR CHEEK
LOCATION DETAILED: RIGHT ANTERIOR MEDIAL DISTAL UPPER ARM
LOCATION DETAILED: RIGHT ANTERIOR PROXIMAL THIGH
LOCATION DETAILED: LEFT DISTAL CALF
LOCATION DETAILED: LEFT CENTRAL MALAR CHEEK
LOCATION DETAILED: SUBXIPHOID
LOCATION DETAILED: RIGHT LATERAL ABDOMEN
LOCATION DETAILED: RIGHT MEDIAL MALAR CHEEK
LOCATION DETAILED: LEFT PROXIMAL CALF
LOCATION DETAILED: LEFT RADIAL DORSAL HAND
LOCATION DETAILED: RIGHT MID-UPPER BACK
LOCATION DETAILED: LEFT DISTAL POSTERIOR UPPER ARM
LOCATION DETAILED: LEFT PROXIMAL LATERAL CALF
LOCATION DETAILED: LEFT ANTERIOR LATERAL PROXIMAL UPPER ARM
LOCATION DETAILED: PERIUMBILICAL SKIN
LOCATION DETAILED: RIGHT DISTAL CALF
LOCATION DETAILED: MID-OCCIPITAL SCALP
LOCATION DETAILED: LEFT CENTRAL TEMPLE
LOCATION DETAILED: RIGHT ANTERIOR DISTAL THIGH
LOCATION DETAILED: SUPERIOR THORACIC SPINE
LOCATION DETAILED: RIGHT ANTERIOR PROXIMAL UPPER ARM
LOCATION DETAILED: RIGHT VENTRAL PROXIMAL FOREARM
LOCATION DETAILED: LEFT MID-UPPER BACK
LOCATION DETAILED: RIGHT PROXIMAL PRETIBIAL REGION
LOCATION DETAILED: RIGHT POSTERIOR SHOULDER
LOCATION DETAILED: LEFT PROXIMAL PRETIBIAL REGION
LOCATION DETAILED: RIGHT SUPERIOR LATERAL UPPER BACK
LOCATION DETAILED: LEFT PROXIMAL POSTERIOR UPPER ARM
LOCATION DETAILED: LEFT PROXIMAL DORSAL FOREARM
LOCATION DETAILED: RIGHT ANTERIOR LATERAL PROXIMAL THIGH
LOCATION DETAILED: LEFT MEDIAL SUPERIOR CHEST
LOCATION DETAILED: RIGHT CLAVICULAR SKIN
LOCATION DETAILED: LEFT INFERIOR UPPER BACK
LOCATION DETAILED: RIGHT MEDIAL BREAST 1-2:00 REGION
LOCATION DETAILED: RIGHT ELBOW
LOCATION DETAILED: RIGHT INFERIOR MEDIAL FOREHEAD
LOCATION DETAILED: RIGHT RADIAL DORSAL HAND
LOCATION DETAILED: LEFT RIB CAGE
LOCATION DETAILED: LEFT SUPERIOR UPPER BACK
LOCATION DETAILED: LEFT ANTERIOR DISTAL THIGH
LOCATION DETAILED: LEFT INFERIOR CENTRAL MALAR CHEEK
LOCATION DETAILED: RIGHT ANTERIOR SHOULDER
LOCATION DETAILED: LEFT LATERAL SUPERIOR CHEST
LOCATION DETAILED: RIGHT DISTAL POSTERIOR UPPER ARM
LOCATION DETAILED: RIGHT ANTERIOR DISTAL UPPER ARM
LOCATION DETAILED: RIGHT PROXIMAL DORSAL FOREARM
LOCATION DETAILED: RIGHT SUPERIOR LATERAL MIDBACK
LOCATION DETAILED: EPIGASTRIC SKIN
LOCATION DETAILED: RIGHT PROXIMAL POSTERIOR UPPER ARM

## 2022-05-09 ASSESSMENT — LOCATION ZONE DERM
LOCATION ZONE: TRUNK
LOCATION ZONE: SCALP
LOCATION ZONE: HAND
LOCATION ZONE: LEG
LOCATION ZONE: ARM
LOCATION ZONE: FACE
LOCATION ZONE: NECK

## 2022-05-09 NOTE — PROCEDURE: DEFER
Instructions (Optional): Refer to [facility] ER for further evaluation.
Detail Level: Simple
Introduction Text (Please End With A Colon): The following procedure was deferred:

## 2022-05-09 NOTE — PROCEDURE: ADDITIONAL NOTES
Render Risk Assessment In Note?: no
Additional Notes: Patient is having shortness of breath, and fever. Red toe swelling and paronychia. \\nRefer to [facility] ER.
Detail Level: Simple

## 2022-11-09 ENCOUNTER — APPOINTMENT (RX ONLY)
Dept: URBAN - METROPOLITAN AREA CLINIC 128 | Facility: CLINIC | Age: 67
Setting detail: DERMATOLOGY
End: 2022-11-09

## 2022-11-09 DIAGNOSIS — L81.4 OTHER MELANIN HYPERPIGMENTATION: ICD-10-CM

## 2022-11-09 DIAGNOSIS — I78.8 OTHER DISEASES OF CAPILLARIES: ICD-10-CM

## 2022-11-09 DIAGNOSIS — L81.5 LEUKODERMA, NOT ELSEWHERE CLASSIFIED: ICD-10-CM

## 2022-11-09 DIAGNOSIS — L82.1 OTHER SEBORRHEIC KERATOSIS: ICD-10-CM

## 2022-11-09 DIAGNOSIS — D22 MELANOCYTIC NEVI: ICD-10-CM

## 2022-11-09 DIAGNOSIS — L72.8 OTHER FOLLICULAR CYSTS OF THE SKIN AND SUBCUTANEOUS TISSUE: ICD-10-CM

## 2022-11-09 PROBLEM — D22.62 MELANOCYTIC NEVI OF LEFT UPPER LIMB, INCLUDING SHOULDER: Status: ACTIVE | Noted: 2022-11-09

## 2022-11-09 PROBLEM — D22.71 MELANOCYTIC NEVI OF RIGHT LOWER LIMB, INCLUDING HIP: Status: ACTIVE | Noted: 2022-11-09

## 2022-11-09 PROBLEM — D22.5 MELANOCYTIC NEVI OF TRUNK: Status: ACTIVE | Noted: 2022-11-09

## 2022-11-09 PROBLEM — D22.72 MELANOCYTIC NEVI OF LEFT LOWER LIMB, INCLUDING HIP: Status: ACTIVE | Noted: 2022-11-09

## 2022-11-09 PROBLEM — D22.61 MELANOCYTIC NEVI OF RIGHT UPPER LIMB, INCLUDING SHOULDER: Status: ACTIVE | Noted: 2022-11-09

## 2022-11-09 PROCEDURE — 99213 OFFICE O/P EST LOW 20 MIN: CPT

## 2022-11-09 PROCEDURE — ? SUNSCREEN RECOMMENDATIONS

## 2022-11-09 PROCEDURE — ? COUNSELING

## 2022-11-09 ASSESSMENT — LOCATION DETAILED DESCRIPTION DERM
LOCATION DETAILED: RIGHT DISTAL POSTERIOR THIGH
LOCATION DETAILED: LEFT DISTAL CALF
LOCATION DETAILED: RIGHT LATERAL ABDOMEN
LOCATION DETAILED: RIGHT SUPERIOR LATERAL UPPER BACK
LOCATION DETAILED: SUBXIPHOID
LOCATION DETAILED: RIGHT DISTAL CALF
LOCATION DETAILED: LEFT POPLITEAL SKIN
LOCATION DETAILED: LEFT ULNAR DORSAL HAND
LOCATION DETAILED: LEFT ANTERIOR SHOULDER
LOCATION DETAILED: LEFT CENTRAL TEMPLE
LOCATION DETAILED: RIGHT ANTERIOR SHOULDER
LOCATION DETAILED: RIGHT ANTERIOR DISTAL THIGH
LOCATION DETAILED: EPIGASTRIC SKIN
LOCATION DETAILED: SUPERIOR LUMBAR SPINE
LOCATION DETAILED: LEFT ANTERIOR PROXIMAL THIGH
LOCATION DETAILED: LEFT PROXIMAL PRETIBIAL REGION
LOCATION DETAILED: RIGHT ANTERIOR PROXIMAL UPPER ARM
LOCATION DETAILED: RIGHT MEDIAL BREAST 1-2:00 REGION
LOCATION DETAILED: RIGHT DISTAL POSTERIOR UPPER ARM
LOCATION DETAILED: LEFT MEDIAL MALAR CHEEK
LOCATION DETAILED: LEFT ANTERIOR DISTAL THIGH
LOCATION DETAILED: RIGHT MEDIAL UPPER BACK
LOCATION DETAILED: LEFT POSTERIOR SHOULDER
LOCATION DETAILED: RIGHT PROXIMAL POSTERIOR UPPER ARM
LOCATION DETAILED: LEFT ANTERIOR PROXIMAL UPPER ARM
LOCATION DETAILED: LEFT INFERIOR UPPER BACK
LOCATION DETAILED: RIGHT PROXIMAL PRETIBIAL REGION
LOCATION DETAILED: RIGHT ANTERIOR PROXIMAL THIGH
LOCATION DETAILED: LEFT DISTAL POSTERIOR UPPER ARM
LOCATION DETAILED: LEFT VENTRAL PROXIMAL FOREARM
LOCATION DETAILED: RIGHT POSTERIOR SHOULDER
LOCATION DETAILED: PERIUMBILICAL SKIN
LOCATION DETAILED: RIGHT ANTERIOR DISTAL UPPER ARM
LOCATION DETAILED: RIGHT INFERIOR MEDIAL UPPER BACK
LOCATION DETAILED: RIGHT INFERIOR CENTRAL MALAR CHEEK
LOCATION DETAILED: RIGHT RADIAL DORSAL HAND
LOCATION DETAILED: LEFT PROXIMAL POSTERIOR UPPER ARM
LOCATION DETAILED: LEFT MEDIAL SUPERIOR CHEST
LOCATION DETAILED: LEFT SUPERIOR UPPER BACK
LOCATION DETAILED: RIGHT SUPERIOR MEDIAL UPPER BACK
LOCATION DETAILED: LEFT INFERIOR CENTRAL MALAR CHEEK
LOCATION DETAILED: RIGHT MEDIAL MALAR CHEEK
LOCATION DETAILED: RIGHT PROXIMAL CALF
LOCATION DETAILED: RIGHT PROXIMAL LATERAL POSTERIOR UPPER ARM
LOCATION DETAILED: LEFT MEDIAL UPPER BACK
LOCATION DETAILED: LEFT PROXIMAL CALF

## 2022-11-09 ASSESSMENT — LOCATION SIMPLE DESCRIPTION DERM
LOCATION SIMPLE: LEFT FOREARM
LOCATION SIMPLE: RIGHT POSTERIOR THIGH
LOCATION SIMPLE: LEFT POPLITEAL SKIN
LOCATION SIMPLE: LEFT PRETIBIAL REGION
LOCATION SIMPLE: LEFT CHEEK
LOCATION SIMPLE: LEFT UPPER BACK
LOCATION SIMPLE: ABDOMEN
LOCATION SIMPLE: RIGHT CALF
LOCATION SIMPLE: LEFT SHOULDER
LOCATION SIMPLE: RIGHT HAND
LOCATION SIMPLE: RIGHT UPPER ARM
LOCATION SIMPLE: RIGHT THIGH
LOCATION SIMPLE: LEFT POSTERIOR UPPER ARM
LOCATION SIMPLE: RIGHT BREAST
LOCATION SIMPLE: CHEST
LOCATION SIMPLE: LEFT CALF
LOCATION SIMPLE: RIGHT POSTERIOR UPPER ARM
LOCATION SIMPLE: LEFT UPPER ARM
LOCATION SIMPLE: RIGHT UPPER BACK
LOCATION SIMPLE: LOWER BACK
LOCATION SIMPLE: RIGHT BACK
LOCATION SIMPLE: LEFT TEMPLE
LOCATION SIMPLE: RIGHT SHOULDER
LOCATION SIMPLE: RIGHT PRETIBIAL REGION
LOCATION SIMPLE: LEFT THIGH
LOCATION SIMPLE: RIGHT CHEEK
LOCATION SIMPLE: LEFT HAND

## 2022-11-09 ASSESSMENT — LOCATION ZONE DERM
LOCATION ZONE: HAND
LOCATION ZONE: ARM
LOCATION ZONE: LEG
LOCATION ZONE: TRUNK
LOCATION ZONE: FACE

## 2022-11-18 ENCOUNTER — HOSPITAL ENCOUNTER (EMERGENCY)
Facility: HOSPITAL | Age: 67
Discharge: HOME | End: 2022-11-18
Attending: STUDENT IN AN ORGANIZED HEALTH CARE EDUCATION/TRAINING PROGRAM
Payer: MEDICARE

## 2022-11-18 ENCOUNTER — APPOINTMENT (OUTPATIENT)
Dept: CT IMAGING | Facility: HOSPITAL | Age: 67
End: 2022-11-18
Attending: STUDENT IN AN ORGANIZED HEALTH CARE EDUCATION/TRAINING PROGRAM
Payer: MEDICARE

## 2022-11-18 VITALS
RESPIRATION RATE: 18 BRPM | DIASTOLIC BLOOD PRESSURE: 49 MMHG | SYSTOLIC BLOOD PRESSURE: 140 MMHG | HEIGHT: 66 IN | WEIGHT: 228 LBS | OXYGEN SATURATION: 98 % | BODY MASS INDEX: 36.64 KG/M2 | TEMPERATURE: 98 F | HEART RATE: 87 BPM

## 2022-11-18 DIAGNOSIS — R11.11 VOMITING WITHOUT NAUSEA, UNSPECIFIED VOMITING TYPE: Primary | ICD-10-CM

## 2022-11-18 LAB
ALBUMIN SERPL-MCNC: 3.8 G/DL (ref 3.4–5)
ALP LIVER SERPL-CCNC: 89 U/L
ALT SERPL-CCNC: 19 U/L
ANION GAP SERPL CALC-SCNC: 7 MMOL/L (ref 0–18)
AST SERPL-CCNC: 11 U/L (ref 15–37)
ATRIAL RATE: 96 BPM
BASOPHILS # BLD AUTO: 0.08 X10(3) UL (ref 0–0.2)
BASOPHILS NFR BLD AUTO: 1.4 %
BILIRUB DIRECT SERPL-MCNC: 0.1 MG/DL (ref 0–0.2)
BILIRUB SERPL-MCNC: 0.4 MG/DL (ref 0.1–2)
BUN BLD-MCNC: 31 MG/DL (ref 7–18)
BUN/CREAT SERPL: 30.1 (ref 10–20)
CALCIUM BLD-MCNC: 9.5 MG/DL (ref 8.5–10.1)
CHLORIDE SERPL-SCNC: 111 MMOL/L (ref 98–112)
CO2 SERPL-SCNC: 22 MMOL/L (ref 21–32)
CREAT BLD-MCNC: 1.03 MG/DL
DEPRECATED RDW RBC AUTO: 50.6 FL (ref 35.1–46.3)
EOSINOPHIL # BLD AUTO: 0.34 X10(3) UL (ref 0–0.7)
EOSINOPHIL NFR BLD AUTO: 5.8 %
ERYTHROCYTE [DISTWIDTH] IN BLOOD BY AUTOMATED COUNT: 14.6 % (ref 11–15)
GFR SERPLBLD BASED ON 1.73 SQ M-ARVRAT: 60 ML/MIN/1.73M2 (ref 60–?)
GLUCOSE BLD-MCNC: 253 MG/DL (ref 70–99)
HCT VFR BLD AUTO: 30.2 %
HGB BLD-MCNC: 9.3 G/DL
IMM GRANULOCYTES # BLD AUTO: 0.02 X10(3) UL (ref 0–1)
IMM GRANULOCYTES NFR BLD: 0.3 %
LIPASE SERPL-CCNC: 53 U/L (ref 73–393)
LYMPHOCYTES # BLD AUTO: 1.26 X10(3) UL (ref 1–4)
LYMPHOCYTES NFR BLD AUTO: 21.5 %
MCH RBC QN AUTO: 28.9 PG (ref 26–34)
MCHC RBC AUTO-ENTMCNC: 30.8 G/DL (ref 31–37)
MCV RBC AUTO: 93.8 FL
MONOCYTES # BLD AUTO: 0.44 X10(3) UL (ref 0.1–1)
MONOCYTES NFR BLD AUTO: 7.5 %
NEUTROPHILS # BLD AUTO: 3.71 X10 (3) UL (ref 1.5–7.7)
NEUTROPHILS # BLD AUTO: 3.71 X10(3) UL (ref 1.5–7.7)
NEUTROPHILS NFR BLD AUTO: 63.5 %
OSMOLALITY SERPL CALC.SUM OF ELEC: 305 MOSM/KG (ref 275–295)
P AXIS: 57 DEGREES
P-R INTERVAL: 180 MS
PLATELET # BLD AUTO: 354 10(3)UL (ref 150–450)
POTASSIUM SERPL-SCNC: 4.9 MMOL/L (ref 3.5–5.1)
PROT SERPL-MCNC: 7 G/DL (ref 6.4–8.2)
Q-T INTERVAL: 320 MS
QRS DURATION: 76 MS
QTC CALCULATION (BEZET): 404 MS
R AXIS: -5 DEGREES
RBC # BLD AUTO: 3.22 X10(6)UL
SODIUM SERPL-SCNC: 140 MMOL/L (ref 136–145)
T AXIS: 13 DEGREES
TROPONIN I HIGH SENSITIVITY: 12 NG/L
VENTRICULAR RATE: 96 BPM
WBC # BLD AUTO: 5.9 X10(3) UL (ref 4–11)

## 2022-11-18 PROCEDURE — 85025 COMPLETE CBC W/AUTO DIFF WBC: CPT | Performed by: STUDENT IN AN ORGANIZED HEALTH CARE EDUCATION/TRAINING PROGRAM

## 2022-11-18 PROCEDURE — 83690 ASSAY OF LIPASE: CPT | Performed by: STUDENT IN AN ORGANIZED HEALTH CARE EDUCATION/TRAINING PROGRAM

## 2022-11-18 PROCEDURE — 99284 EMERGENCY DEPT VISIT MOD MDM: CPT

## 2022-11-18 PROCEDURE — 99285 EMERGENCY DEPT VISIT HI MDM: CPT

## 2022-11-18 PROCEDURE — 93005 ELECTROCARDIOGRAM TRACING: CPT

## 2022-11-18 PROCEDURE — 85025 COMPLETE CBC W/AUTO DIFF WBC: CPT

## 2022-11-18 PROCEDURE — 80048 BASIC METABOLIC PNL TOTAL CA: CPT

## 2022-11-18 PROCEDURE — 74177 CT ABD & PELVIS W/CONTRAST: CPT | Performed by: STUDENT IN AN ORGANIZED HEALTH CARE EDUCATION/TRAINING PROGRAM

## 2022-11-18 PROCEDURE — 93010 ELECTROCARDIOGRAM REPORT: CPT | Performed by: STUDENT IN AN ORGANIZED HEALTH CARE EDUCATION/TRAINING PROGRAM

## 2022-11-18 PROCEDURE — 80048 BASIC METABOLIC PNL TOTAL CA: CPT | Performed by: STUDENT IN AN ORGANIZED HEALTH CARE EDUCATION/TRAINING PROGRAM

## 2022-11-18 PROCEDURE — 84484 ASSAY OF TROPONIN QUANT: CPT | Performed by: STUDENT IN AN ORGANIZED HEALTH CARE EDUCATION/TRAINING PROGRAM

## 2022-11-18 PROCEDURE — 96374 THER/PROPH/DIAG INJ IV PUSH: CPT

## 2022-11-18 PROCEDURE — 80076 HEPATIC FUNCTION PANEL: CPT | Performed by: STUDENT IN AN ORGANIZED HEALTH CARE EDUCATION/TRAINING PROGRAM

## 2022-11-18 RX ORDER — ONDANSETRON 2 MG/ML
4 INJECTION INTRAMUSCULAR; INTRAVENOUS ONCE
Status: COMPLETED | OUTPATIENT
Start: 2022-11-18 | End: 2022-11-18

## 2022-11-18 RX ORDER — ONDANSETRON 4 MG/1
4 TABLET, ORALLY DISINTEGRATING ORAL EVERY 4 HOURS PRN
Qty: 10 TABLET | Refills: 0 | Status: SHIPPED | OUTPATIENT
Start: 2022-11-18 | End: 2022-11-25

## 2023-09-20 ENCOUNTER — APPOINTMENT (EMERGENCY)
Dept: RADIOLOGY | Facility: HOSPITAL | Age: 68
DRG: 377 | End: 2023-09-20
Payer: MEDICARE

## 2023-09-20 ENCOUNTER — HOSPITAL ENCOUNTER (INPATIENT)
Facility: HOSPITAL | Age: 68
LOS: 5 days | Discharge: HOME HEALTH | DRG: 377 | End: 2023-09-26
Attending: EMERGENCY MEDICINE | Admitting: STUDENT IN AN ORGANIZED HEALTH CARE EDUCATION/TRAINING PROGRAM
Payer: MEDICARE

## 2023-09-20 DIAGNOSIS — J84.9 INTERSTITIAL LUNG DISEASE (HCC): Primary | ICD-10-CM

## 2023-09-20 DIAGNOSIS — I10 HYPERTENSION: ICD-10-CM

## 2023-09-20 DIAGNOSIS — Q27.30 AVM (ARTERIOVENOUS MALFORMATION): ICD-10-CM

## 2023-09-20 DIAGNOSIS — J18.9 PNEUMONIA: ICD-10-CM

## 2023-09-20 DIAGNOSIS — Z98.61 CAD S/P PERCUTANEOUS CORONARY ANGIOPLASTY: ICD-10-CM

## 2023-09-20 DIAGNOSIS — I50.9 CHF (CONGESTIVE HEART FAILURE) (HCC): ICD-10-CM

## 2023-09-20 DIAGNOSIS — J96.01 ACUTE HYPOXEMIC RESPIRATORY FAILURE (HCC): ICD-10-CM

## 2023-09-20 DIAGNOSIS — D64.9 ANEMIA: ICD-10-CM

## 2023-09-20 DIAGNOSIS — E10.9 TYPE 1 DIABETES MELLITUS WITHOUT COMPLICATION (HCC): ICD-10-CM

## 2023-09-20 DIAGNOSIS — R06.02 SOB (SHORTNESS OF BREATH): ICD-10-CM

## 2023-09-20 DIAGNOSIS — I25.10 CAD S/P PERCUTANEOUS CORONARY ANGIOPLASTY: ICD-10-CM

## 2023-09-20 LAB
ALBUMIN SERPL BCP-MCNC: 4.1 G/DL (ref 3.5–5)
ALP SERPL-CCNC: 57 U/L (ref 34–104)
ALT SERPL W P-5'-P-CCNC: 9 U/L (ref 7–52)
ANION GAP SERPL CALCULATED.3IONS-SCNC: 10 MMOL/L
APTT PPP: 28 SECONDS (ref 23–37)
AST SERPL W P-5'-P-CCNC: 15 U/L (ref 13–39)
BASE EX.OXY STD BLDV CALC-SCNC: 59.3 % (ref 60–80)
BASE EXCESS BLDV CALC-SCNC: -5.2 MMOL/L
BASOPHILS # BLD AUTO: 0.06 THOUSANDS/ÂΜL (ref 0–0.1)
BASOPHILS NFR BLD AUTO: 1 % (ref 0–1)
BILIRUB SERPL-MCNC: 0.26 MG/DL (ref 0.2–1)
BUN SERPL-MCNC: 49 MG/DL (ref 5–25)
CALCIUM SERPL-MCNC: 9.2 MG/DL (ref 8.4–10.2)
CARDIAC TROPONIN I PNL SERPL HS: 6 NG/L
CHLORIDE SERPL-SCNC: 106 MMOL/L (ref 96–108)
CO2 SERPL-SCNC: 19 MMOL/L (ref 21–32)
CREAT SERPL-MCNC: 1.29 MG/DL (ref 0.6–1.3)
EOSINOPHIL # BLD AUTO: 0.25 THOUSAND/ÂΜL (ref 0–0.61)
EOSINOPHIL NFR BLD AUTO: 2 % (ref 0–6)
ERYTHROCYTE [DISTWIDTH] IN BLOOD BY AUTOMATED COUNT: 15 % (ref 11.6–15.1)
GFR SERPL CREATININE-BSD FRML MDRD: 43 ML/MIN/1.73SQ M
GLUCOSE SERPL-MCNC: 117 MG/DL (ref 65–140)
GLUCOSE SERPL-MCNC: 141 MG/DL (ref 65–140)
HCO3 BLDV-SCNC: 20 MMOL/L (ref 24–30)
HCT VFR BLD AUTO: 22.3 % (ref 34.8–46.1)
HGB BLD-MCNC: 7 G/DL (ref 11.5–15.4)
IMM GRANULOCYTES # BLD AUTO: 0.04 THOUSAND/UL (ref 0–0.2)
IMM GRANULOCYTES NFR BLD AUTO: 0 % (ref 0–2)
INR PPP: 0.96 (ref 0.84–1.19)
LACTATE SERPL-SCNC: 1.7 MMOL/L (ref 0.5–2)
LYMPHOCYTES # BLD AUTO: 1.58 THOUSANDS/ÂΜL (ref 0.6–4.47)
LYMPHOCYTES NFR BLD AUTO: 15 % (ref 14–44)
MCH RBC QN AUTO: 29.8 PG (ref 26.8–34.3)
MCHC RBC AUTO-ENTMCNC: 31.4 G/DL (ref 31.4–37.4)
MCV RBC AUTO: 95 FL (ref 82–98)
MONOCYTES # BLD AUTO: 0.77 THOUSAND/ÂΜL (ref 0.17–1.22)
MONOCYTES NFR BLD AUTO: 7 % (ref 4–12)
NEUTROPHILS # BLD AUTO: 8.01 THOUSANDS/ÂΜL (ref 1.85–7.62)
NEUTS SEG NFR BLD AUTO: 75 % (ref 43–75)
NRBC BLD AUTO-RTO: 0 /100 WBCS
O2 CT BLDV-SCNC: 6.8 ML/DL
PCO2 BLDV: 37.6 MM HG (ref 42–50)
PH BLDV: 7.34 [PH] (ref 7.3–7.4)
PLATELET # BLD AUTO: 469 THOUSANDS/UL (ref 149–390)
PMV BLD AUTO: 10 FL (ref 8.9–12.7)
PO2 BLDV: 32.7 MM HG (ref 35–45)
POTASSIUM SERPL-SCNC: 4.1 MMOL/L (ref 3.5–5.3)
PROCALCITONIN SERPL-MCNC: 0.09 NG/ML
PROT SERPL-MCNC: 6.6 G/DL (ref 6.4–8.4)
PROTHROMBIN TIME: 13.4 SECONDS (ref 11.6–14.5)
RBC # BLD AUTO: 2.35 MILLION/UL (ref 3.81–5.12)
SODIUM SERPL-SCNC: 135 MMOL/L (ref 135–147)
WBC # BLD AUTO: 10.71 THOUSAND/UL (ref 4.31–10.16)

## 2023-09-20 PROCEDURE — 96374 THER/PROPH/DIAG INJ IV PUSH: CPT

## 2023-09-20 PROCEDURE — 99285 EMERGENCY DEPT VISIT HI MDM: CPT

## 2023-09-20 PROCEDURE — 86923 COMPATIBILITY TEST ELECTRIC: CPT

## 2023-09-20 PROCEDURE — 86850 RBC ANTIBODY SCREEN: CPT | Performed by: EMERGENCY MEDICINE

## 2023-09-20 PROCEDURE — 36415 COLL VENOUS BLD VENIPUNCTURE: CPT | Performed by: EMERGENCY MEDICINE

## 2023-09-20 PROCEDURE — 87040 BLOOD CULTURE FOR BACTERIA: CPT | Performed by: EMERGENCY MEDICINE

## 2023-09-20 PROCEDURE — 85610 PROTHROMBIN TIME: CPT | Performed by: EMERGENCY MEDICINE

## 2023-09-20 PROCEDURE — 86900 BLOOD TYPING SEROLOGIC ABO: CPT | Performed by: EMERGENCY MEDICINE

## 2023-09-20 PROCEDURE — 83880 ASSAY OF NATRIURETIC PEPTIDE: CPT | Performed by: EMERGENCY MEDICINE

## 2023-09-20 PROCEDURE — 0241U HB NFCT DS VIR RESP RNA 4 TRGT: CPT | Performed by: EMERGENCY MEDICINE

## 2023-09-20 PROCEDURE — 99291 CRITICAL CARE FIRST HOUR: CPT | Performed by: EMERGENCY MEDICINE

## 2023-09-20 PROCEDURE — 85025 COMPLETE CBC W/AUTO DIFF WBC: CPT | Performed by: EMERGENCY MEDICINE

## 2023-09-20 PROCEDURE — 84484 ASSAY OF TROPONIN QUANT: CPT | Performed by: EMERGENCY MEDICINE

## 2023-09-20 PROCEDURE — 96375 TX/PRO/DX INJ NEW DRUG ADDON: CPT

## 2023-09-20 PROCEDURE — 84145 PROCALCITONIN (PCT): CPT | Performed by: EMERGENCY MEDICINE

## 2023-09-20 PROCEDURE — 1123F ACP DISCUSS/DSCN MKR DOCD: CPT | Performed by: INTERNAL MEDICINE

## 2023-09-20 PROCEDURE — 82948 REAGENT STRIP/BLOOD GLUCOSE: CPT

## 2023-09-20 PROCEDURE — 83605 ASSAY OF LACTIC ACID: CPT | Performed by: EMERGENCY MEDICINE

## 2023-09-20 PROCEDURE — 71045 X-RAY EXAM CHEST 1 VIEW: CPT

## 2023-09-20 PROCEDURE — 80053 COMPREHEN METABOLIC PANEL: CPT | Performed by: EMERGENCY MEDICINE

## 2023-09-20 PROCEDURE — 93005 ELECTROCARDIOGRAM TRACING: CPT

## 2023-09-20 PROCEDURE — 85730 THROMBOPLASTIN TIME PARTIAL: CPT | Performed by: EMERGENCY MEDICINE

## 2023-09-20 PROCEDURE — 82805 BLOOD GASES W/O2 SATURATION: CPT | Performed by: EMERGENCY MEDICINE

## 2023-09-20 PROCEDURE — 86901 BLOOD TYPING SEROLOGIC RH(D): CPT | Performed by: EMERGENCY MEDICINE

## 2023-09-20 RX ORDER — FUROSEMIDE 10 MG/ML
40 INJECTION INTRAMUSCULAR; INTRAVENOUS ONCE
Status: COMPLETED | OUTPATIENT
Start: 2023-09-20 | End: 2023-09-20

## 2023-09-20 RX ORDER — ACETAMINOPHEN 325 MG/1
975 TABLET ORAL ONCE
Status: COMPLETED | OUTPATIENT
Start: 2023-09-20 | End: 2023-09-20

## 2023-09-20 RX ORDER — ONDANSETRON 2 MG/ML
4 INJECTION INTRAMUSCULAR; INTRAVENOUS ONCE
Status: COMPLETED | OUTPATIENT
Start: 2023-09-20 | End: 2023-09-20

## 2023-09-20 RX ORDER — FENTANYL CITRATE 50 UG/ML
50 INJECTION, SOLUTION INTRAMUSCULAR; INTRAVENOUS ONCE
Status: COMPLETED | OUTPATIENT
Start: 2023-09-20 | End: 2023-09-20

## 2023-09-20 RX ORDER — IPRATROPIUM BROMIDE AND ALBUTEROL SULFATE .5; 3 MG/3ML; MG/3ML
2 SOLUTION RESPIRATORY (INHALATION) ONCE
Status: COMPLETED | OUTPATIENT
Start: 2023-09-20 | End: 2023-09-20

## 2023-09-20 RX ORDER — SODIUM CHLORIDE 9 MG/ML
3 INJECTION INTRAVENOUS EVERY 12 HOURS SCHEDULED
Status: DISCONTINUED | OUTPATIENT
Start: 2023-09-20 | End: 2023-09-26 | Stop reason: HOSPADM

## 2023-09-20 RX ADMIN — SODIUM CHLORIDE, PRESERVATIVE FREE 3 ML: 5 INJECTION INTRAVENOUS at 23:39

## 2023-09-20 RX ADMIN — ACETAMINOPHEN 975 MG: 325 TABLET, FILM COATED ORAL at 22:59

## 2023-09-20 RX ADMIN — ONDANSETRON 4 MG: 2 INJECTION INTRAMUSCULAR; INTRAVENOUS at 23:39

## 2023-09-20 RX ADMIN — FENTANYL CITRATE 50 MCG: 50 INJECTION INTRAMUSCULAR; INTRAVENOUS at 22:59

## 2023-09-20 RX ADMIN — FUROSEMIDE 40 MG: 10 INJECTION, SOLUTION INTRAMUSCULAR; INTRAVENOUS at 23:44

## 2023-09-20 RX ADMIN — NITROGLYCERIN 1 INCH: 20 OINTMENT TOPICAL at 22:59

## 2023-09-21 ENCOUNTER — APPOINTMENT (OUTPATIENT)
Dept: CT IMAGING | Facility: HOSPITAL | Age: 68
DRG: 377 | End: 2023-09-21
Payer: MEDICARE

## 2023-09-21 ENCOUNTER — APPOINTMENT (INPATIENT)
Dept: NON INVASIVE DIAGNOSTICS | Facility: HOSPITAL | Age: 68
DRG: 377 | End: 2023-09-21
Payer: MEDICARE

## 2023-09-21 PROBLEM — I10 HYPERTENSION: Status: ACTIVE | Noted: 2023-09-21

## 2023-09-21 PROBLEM — J84.112 IPF (IDIOPATHIC PULMONARY FIBROSIS) (HCC): Status: ACTIVE | Noted: 2023-09-21

## 2023-09-21 PROBLEM — J96.21 ACUTE ON CHRONIC RESPIRATORY FAILURE WITH HYPOXIA (HCC): Status: ACTIVE | Noted: 2023-09-21

## 2023-09-21 PROBLEM — J18.9 PNEUMONIA: Status: ACTIVE | Noted: 2023-09-21

## 2023-09-21 PROBLEM — E03.9 HYPOTHYROID: Status: ACTIVE | Noted: 2023-09-21

## 2023-09-21 PROBLEM — E10.9 TYPE 1 DIABETES MELLITUS WITHOUT COMPLICATION (HCC): Status: ACTIVE | Noted: 2023-09-21

## 2023-09-21 PROBLEM — I25.10 CAD S/P PERCUTANEOUS CORONARY ANGIOPLASTY: Status: ACTIVE | Noted: 2023-09-21

## 2023-09-21 PROBLEM — Z98.61 CAD S/P PERCUTANEOUS CORONARY ANGIOPLASTY: Status: ACTIVE | Noted: 2023-09-21

## 2023-09-21 PROBLEM — D64.9 ANEMIA: Status: ACTIVE | Noted: 2023-09-21

## 2023-09-21 LAB
2HR DELTA HS TROPONIN: -1 NG/L
4HR DELTA HS TROPONIN: 0 NG/L
ABO GROUP BLD: NORMAL
BILIRUB UR QL STRIP: NEGATIVE
BLD GP AB SCN SERPL QL: NEGATIVE
BLD GP AB SCN SERPL QL: NEGATIVE
BNP SERPL-MCNC: 65 PG/ML (ref 0–100)
CARDIAC TROPONIN I PNL SERPL HS: 5 NG/L
CARDIAC TROPONIN I PNL SERPL HS: 6 NG/L
CLARITY UR: CLEAR
COLOR UR: COLORLESS
ERYTHROCYTE [DISTWIDTH] IN BLOOD BY AUTOMATED COUNT: 15.1 % (ref 11.6–15.1)
FLUAV RNA RESP QL NAA+PROBE: NEGATIVE
FLUBV RNA RESP QL NAA+PROBE: NEGATIVE
GLUCOSE SERPL-MCNC: 173 MG/DL (ref 65–140)
GLUCOSE SERPL-MCNC: 268 MG/DL (ref 65–140)
GLUCOSE SERPL-MCNC: 342 MG/DL (ref 65–140)
GLUCOSE SERPL-MCNC: 384 MG/DL (ref 65–140)
GLUCOSE UR STRIP-MCNC: NEGATIVE MG/DL
HCT VFR BLD AUTO: 17.3 % (ref 34.8–46.1)
HGB BLD-MCNC: 5.4 G/DL (ref 11.5–15.4)
HGB UR QL STRIP.AUTO: NEGATIVE
KETONES UR STRIP-MCNC: NEGATIVE MG/DL
L PNEUMO1 AG UR QL IA.RAPID: NEGATIVE
LEUKOCYTE ESTERASE UR QL STRIP: NEGATIVE
MCH RBC QN AUTO: 29 PG (ref 26.8–34.3)
MCHC RBC AUTO-ENTMCNC: 31.2 G/DL (ref 31.4–37.4)
MCV RBC AUTO: 93 FL (ref 82–98)
NITRITE UR QL STRIP: NEGATIVE
PH UR STRIP.AUTO: 5 [PH]
PLATELET # BLD AUTO: 358 THOUSANDS/UL (ref 149–390)
PMV BLD AUTO: 10.3 FL (ref 8.9–12.7)
PROT UR STRIP-MCNC: NEGATIVE MG/DL
RBC # BLD AUTO: 1.86 MILLION/UL (ref 3.81–5.12)
RH BLD: NEGATIVE
RSV RNA RESP QL NAA+PROBE: NEGATIVE
S PNEUM AG UR QL: NEGATIVE
SARS-COV-2 RNA RESP QL NAA+PROBE: NEGATIVE
SP GR UR STRIP.AUTO: 1.01 (ref 1–1.03)
SPECIMEN EXPIRATION DATE: NORMAL
SPECIMEN EXPIRATION DATE: NORMAL
UROBILINOGEN UR STRIP-ACNC: <2 MG/DL
WBC # BLD AUTO: 5.03 THOUSAND/UL (ref 4.31–10.16)

## 2023-09-21 PROCEDURE — 87449 NOS EACH ORGANISM AG IA: CPT | Performed by: INTERNAL MEDICINE

## 2023-09-21 PROCEDURE — 81003 URINALYSIS AUTO W/O SCOPE: CPT | Performed by: EMERGENCY MEDICINE

## 2023-09-21 PROCEDURE — 84484 ASSAY OF TROPONIN QUANT: CPT | Performed by: EMERGENCY MEDICINE

## 2023-09-21 PROCEDURE — 86901 BLOOD TYPING SEROLOGIC RH(D): CPT | Performed by: STUDENT IN AN ORGANIZED HEALTH CARE EDUCATION/TRAINING PROGRAM

## 2023-09-21 PROCEDURE — 85027 COMPLETE CBC AUTOMATED: CPT | Performed by: STUDENT IN AN ORGANIZED HEALTH CARE EDUCATION/TRAINING PROGRAM

## 2023-09-21 PROCEDURE — 87205 SMEAR GRAM STAIN: CPT | Performed by: INTERNAL MEDICINE

## 2023-09-21 PROCEDURE — 82948 REAGENT STRIP/BLOOD GLUCOSE: CPT

## 2023-09-21 PROCEDURE — P9016 RBC LEUKOCYTES REDUCED: HCPCS

## 2023-09-21 PROCEDURE — 0202U NFCT DS 22 TRGT SARS-COV-2: CPT | Performed by: PHYSICIAN ASSISTANT

## 2023-09-21 PROCEDURE — G1004 CDSM NDSC: HCPCS

## 2023-09-21 PROCEDURE — 99223 1ST HOSP IP/OBS HIGH 75: CPT | Performed by: INTERNAL MEDICINE

## 2023-09-21 PROCEDURE — 97167 OT EVAL HIGH COMPLEX 60 MIN: CPT

## 2023-09-21 PROCEDURE — 86900 BLOOD TYPING SEROLOGIC ABO: CPT | Performed by: STUDENT IN AN ORGANIZED HEALTH CARE EDUCATION/TRAINING PROGRAM

## 2023-09-21 PROCEDURE — 36415 COLL VENOUS BLD VENIPUNCTURE: CPT | Performed by: EMERGENCY MEDICINE

## 2023-09-21 PROCEDURE — 94664 DEMO&/EVAL PT USE INHALER: CPT

## 2023-09-21 PROCEDURE — 86850 RBC ANTIBODY SCREEN: CPT | Performed by: STUDENT IN AN ORGANIZED HEALTH CARE EDUCATION/TRAINING PROGRAM

## 2023-09-21 PROCEDURE — 71260 CT THORAX DX C+: CPT

## 2023-09-21 PROCEDURE — C9113 INJ PANTOPRAZOLE SODIUM, VIA: HCPCS | Performed by: STUDENT IN AN ORGANIZED HEALTH CARE EDUCATION/TRAINING PROGRAM

## 2023-09-21 PROCEDURE — 97163 PT EVAL HIGH COMPLEX 45 MIN: CPT

## 2023-09-21 PROCEDURE — 87070 CULTURE OTHR SPECIMN AEROBIC: CPT | Performed by: INTERNAL MEDICINE

## 2023-09-21 RX ORDER — CEFTRIAXONE 1 G/50ML
1000 INJECTION, SOLUTION INTRAVENOUS EVERY 24 HOURS
Status: DISCONTINUED | OUTPATIENT
Start: 2023-09-21 | End: 2023-09-21

## 2023-09-21 RX ORDER — LANOLIN ALCOHOL/MO/W.PET/CERES
3 CREAM (GRAM) TOPICAL
Status: DISCONTINUED | OUTPATIENT
Start: 2023-09-21 | End: 2023-09-26 | Stop reason: HOSPADM

## 2023-09-21 RX ORDER — LEVOTHYROXINE SODIUM 175 UG/1
175 TABLET ORAL
Status: DISCONTINUED | OUTPATIENT
Start: 2023-09-21 | End: 2023-09-26 | Stop reason: HOSPADM

## 2023-09-21 RX ORDER — CEFEPIME HYDROCHLORIDE 2 G/50ML
2000 INJECTION, SOLUTION INTRAVENOUS ONCE
Status: COMPLETED | OUTPATIENT
Start: 2023-09-21 | End: 2023-09-21

## 2023-09-21 RX ORDER — LANOLIN ALCOHOL/MO/W.PET/CERES
3 CREAM (GRAM) TOPICAL
Status: DISCONTINUED | OUTPATIENT
Start: 2023-09-21 | End: 2023-09-21

## 2023-09-21 RX ORDER — CEFTRIAXONE 2 G/50ML
2000 INJECTION, SOLUTION INTRAVENOUS ONCE
Status: DISCONTINUED | OUTPATIENT
Start: 2023-09-21 | End: 2023-09-21

## 2023-09-21 RX ORDER — LISINOPRIL 20 MG/1
20 TABLET ORAL DAILY
Status: DISCONTINUED | OUTPATIENT
Start: 2023-09-21 | End: 2023-09-22

## 2023-09-21 RX ORDER — INSULIN LISPRO 100 [IU]/ML
5 INJECTION, SOLUTION INTRAVENOUS; SUBCUTANEOUS
Status: DISCONTINUED | OUTPATIENT
Start: 2023-09-21 | End: 2023-09-24

## 2023-09-21 RX ORDER — PANTOPRAZOLE SODIUM 40 MG/10ML
40 INJECTION, POWDER, LYOPHILIZED, FOR SOLUTION INTRAVENOUS EVERY 12 HOURS SCHEDULED
Status: DISCONTINUED | OUTPATIENT
Start: 2023-09-21 | End: 2023-09-23

## 2023-09-21 RX ORDER — CLOPIDOGREL BISULFATE 75 MG/1
75 TABLET ORAL DAILY
Status: DISCONTINUED | OUTPATIENT
Start: 2023-09-21 | End: 2023-09-22

## 2023-09-21 RX ORDER — AZITHROMYCIN 500 MG/1
500 TABLET, FILM COATED ORAL EVERY 24 HOURS
Status: DISCONTINUED | OUTPATIENT
Start: 2023-09-21 | End: 2023-09-22

## 2023-09-21 RX ORDER — ALBUTEROL SULFATE 90 UG/1
2 AEROSOL, METERED RESPIRATORY (INHALATION) EVERY 4 HOURS PRN
Status: DISCONTINUED | OUTPATIENT
Start: 2023-09-21 | End: 2023-09-26 | Stop reason: HOSPADM

## 2023-09-21 RX ORDER — FUROSEMIDE 40 MG/1
40 TABLET ORAL DAILY
Status: DISCONTINUED | OUTPATIENT
Start: 2023-09-21 | End: 2023-09-22

## 2023-09-21 RX ORDER — PANTOPRAZOLE SODIUM 20 MG/1
20 TABLET, DELAYED RELEASE ORAL
Status: DISCONTINUED | OUTPATIENT
Start: 2023-09-21 | End: 2023-09-21

## 2023-09-21 RX ORDER — FUROSEMIDE 10 MG/ML
20 INJECTION INTRAMUSCULAR; INTRAVENOUS ONCE
Status: COMPLETED | OUTPATIENT
Start: 2023-09-21 | End: 2023-09-21

## 2023-09-21 RX ORDER — FUROSEMIDE 10 MG/ML
40 INJECTION INTRAMUSCULAR; INTRAVENOUS ONCE
Status: COMPLETED | OUTPATIENT
Start: 2023-09-21 | End: 2023-09-21

## 2023-09-21 RX ORDER — ATORVASTATIN CALCIUM 40 MG/1
80 TABLET, FILM COATED ORAL
Status: DISCONTINUED | OUTPATIENT
Start: 2023-09-21 | End: 2023-09-26 | Stop reason: HOSPADM

## 2023-09-21 RX ORDER — ENOXAPARIN SODIUM 100 MG/ML
40 INJECTION SUBCUTANEOUS DAILY
Status: DISCONTINUED | OUTPATIENT
Start: 2023-09-21 | End: 2023-09-22

## 2023-09-21 RX ORDER — LISINOPRIL 10 MG/1
10 TABLET ORAL DAILY
COMMUNITY

## 2023-09-21 RX ORDER — INSULIN LISPRO 100 [IU]/ML
1-6 INJECTION, SOLUTION INTRAVENOUS; SUBCUTANEOUS
Status: DISCONTINUED | OUTPATIENT
Start: 2023-09-21 | End: 2023-09-26 | Stop reason: HOSPADM

## 2023-09-21 RX ORDER — FUROSEMIDE 40 MG/1
40 TABLET ORAL 2 TIMES DAILY
COMMUNITY
End: 2023-09-26

## 2023-09-21 RX ORDER — INSULIN GLARGINE 100 [IU]/ML
20 INJECTION, SOLUTION SUBCUTANEOUS
Status: DISCONTINUED | OUTPATIENT
Start: 2023-09-21 | End: 2023-09-24

## 2023-09-21 RX ORDER — METOPROLOL SUCCINATE 25 MG/1
25 TABLET, EXTENDED RELEASE ORAL DAILY
Status: DISCONTINUED | OUTPATIENT
Start: 2023-09-21 | End: 2023-09-26 | Stop reason: HOSPADM

## 2023-09-21 RX ORDER — CEFTRIAXONE 2 G/50ML
2000 INJECTION, SOLUTION INTRAVENOUS EVERY 24 HOURS
Status: DISCONTINUED | OUTPATIENT
Start: 2023-09-21 | End: 2023-09-22

## 2023-09-21 RX ORDER — MYCOPHENOLATE MOFETIL 500 MG/1
500 TABLET ORAL EVERY 12 HOURS SCHEDULED
COMMUNITY

## 2023-09-21 RX ORDER — ACETAMINOPHEN 325 MG/1
650 TABLET ORAL EVERY 6 HOURS PRN
Status: DISCONTINUED | OUTPATIENT
Start: 2023-09-21 | End: 2023-09-26 | Stop reason: HOSPADM

## 2023-09-21 RX ORDER — LEVOTHYROXINE SODIUM 175 UG/1
175 TABLET ORAL DAILY
COMMUNITY

## 2023-09-21 RX ORDER — MYCOPHENOLATE MOFETIL 250 MG/1
500 CAPSULE ORAL EVERY 12 HOURS SCHEDULED
Status: DISCONTINUED | OUTPATIENT
Start: 2023-09-21 | End: 2023-09-26 | Stop reason: HOSPADM

## 2023-09-21 RX ORDER — ONDANSETRON 2 MG/ML
4 INJECTION INTRAMUSCULAR; INTRAVENOUS EVERY 6 HOURS PRN
Status: DISCONTINUED | OUTPATIENT
Start: 2023-09-21 | End: 2023-09-26 | Stop reason: HOSPADM

## 2023-09-21 RX ADMIN — INSULIN GLARGINE 20 UNITS: 100 INJECTION, SOLUTION SUBCUTANEOUS at 21:15

## 2023-09-21 RX ADMIN — MYCOPHENOLATE MOFETIL 500 MG: 250 CAPSULE ORAL at 21:15

## 2023-09-21 RX ADMIN — INSULIN LISPRO 5 UNITS: 100 INJECTION, SOLUTION INTRAVENOUS; SUBCUTANEOUS at 08:35

## 2023-09-21 RX ADMIN — FUROSEMIDE 40 MG: 10 INJECTION, SOLUTION INTRAMUSCULAR; INTRAVENOUS at 02:02

## 2023-09-21 RX ADMIN — LEVOTHYROXINE SODIUM 175 MCG: 25 TABLET ORAL at 05:46

## 2023-09-21 RX ADMIN — INSULIN LISPRO 1 UNITS: 100 INJECTION, SOLUTION INTRAVENOUS; SUBCUTANEOUS at 17:37

## 2023-09-21 RX ADMIN — ONDANSETRON 4 MG: 2 INJECTION INTRAMUSCULAR; INTRAVENOUS at 08:49

## 2023-09-21 RX ADMIN — AZITHROMYCIN 500 MG: 500 TABLET, FILM COATED ORAL at 08:34

## 2023-09-21 RX ADMIN — INSULIN LISPRO 6 UNITS: 100 INJECTION, SOLUTION INTRAVENOUS; SUBCUTANEOUS at 11:26

## 2023-09-21 RX ADMIN — CEFEPIME HYDROCHLORIDE 2000 MG: 2 INJECTION, SOLUTION INTRAVENOUS at 00:37

## 2023-09-21 RX ADMIN — SODIUM CHLORIDE, PRESERVATIVE FREE 3 ML: 5 INJECTION INTRAVENOUS at 21:22

## 2023-09-21 RX ADMIN — METOPROLOL SUCCINATE 25 MG: 25 TABLET, EXTENDED RELEASE ORAL at 08:34

## 2023-09-21 RX ADMIN — MELATONIN TAB 3 MG 3 MG: 3 TAB at 03:57

## 2023-09-21 RX ADMIN — FUROSEMIDE 40 MG: 40 TABLET ORAL at 08:34

## 2023-09-21 RX ADMIN — MYCOPHENOLATE MOFETIL 500 MG: 250 CAPSULE ORAL at 08:34

## 2023-09-21 RX ADMIN — ONDANSETRON 4 MG: 2 INJECTION INTRAMUSCULAR; INTRAVENOUS at 13:28

## 2023-09-21 RX ADMIN — INSULIN LISPRO 5 UNITS: 100 INJECTION, SOLUTION INTRAVENOUS; SUBCUTANEOUS at 08:36

## 2023-09-21 RX ADMIN — LISINOPRIL 20 MG: 20 TABLET ORAL at 08:34

## 2023-09-21 RX ADMIN — PANTOPRAZOLE SODIUM 20 MG: 20 TABLET, DELAYED RELEASE ORAL at 05:46

## 2023-09-21 RX ADMIN — SODIUM CHLORIDE, PRESERVATIVE FREE 3 ML: 5 INJECTION INTRAVENOUS at 08:46

## 2023-09-21 RX ADMIN — PANTOPRAZOLE SODIUM 40 MG: 40 INJECTION, POWDER, FOR SOLUTION INTRAVENOUS at 21:15

## 2023-09-21 RX ADMIN — MELATONIN TAB 3 MG 3 MG: 3 TAB at 21:15

## 2023-09-21 RX ADMIN — INSULIN LISPRO 5 UNITS: 100 INJECTION, SOLUTION INTRAVENOUS; SUBCUTANEOUS at 17:37

## 2023-09-21 RX ADMIN — ENOXAPARIN SODIUM 40 MG: 40 INJECTION SUBCUTANEOUS at 08:34

## 2023-09-21 RX ADMIN — FUROSEMIDE 20 MG: 10 INJECTION, SOLUTION INTRAMUSCULAR; INTRAVENOUS at 21:13

## 2023-09-21 RX ADMIN — ASPIRIN 81 MG: 81 TABLET, COATED ORAL at 08:34

## 2023-09-21 RX ADMIN — CEFTRIAXONE 2000 MG: 2 INJECTION, SOLUTION INTRAVENOUS at 08:37

## 2023-09-21 RX ADMIN — INSULIN LISPRO 5 UNITS: 100 INJECTION, SOLUTION INTRAVENOUS; SUBCUTANEOUS at 11:27

## 2023-09-21 RX ADMIN — ATORVASTATIN CALCIUM 80 MG: 40 TABLET, FILM COATED ORAL at 17:37

## 2023-09-21 RX ADMIN — CLOPIDOGREL BISULFATE 75 MG: 75 TABLET ORAL at 08:34

## 2023-09-21 RX ADMIN — IOHEXOL 100 ML: 350 INJECTION, SOLUTION INTRAVENOUS at 00:58

## 2023-09-21 NOTE — RESPIRATORY THERAPY NOTE
RT Protocol Note  Torrey Luna 79 y.o. female MRN: [id number]  Unit/Bed#: ED 29 Encounter: [id number]    Assessment    Principal Problem:    Acute on chronic respiratory failure with hypoxia ([address])  Active Problems:    CAD S/P percutaneous coronary angioplasty    IPF (idiopathic pulmonary fibrosis) (HCC)    Hypothyroid    Hypertension    Type 1 diabetes mellitus without complication (HCC)    Pneumonia    Anemia      Home Pulmonary Medications:  None    Home Devices/Therapy: Home O2 (2L o2 PRN)    Past Medical History:   Diagnosis Date   • Diabetes mellitus ([address])     type 1   • Heart attack ([address])    • Peripheral arterial disease ([address])    • Pneumonia      Social History     Socioeconomic History   • Marital status: /Civil Union     Spouse name: None   • Number of children: None   • Years of education: None   • Highest education level: None   Occupational History   • None   Tobacco Use   • Smoking status: Never   • Smokeless tobacco: Never   Vaping Use   • Vaping Use: Never used   Substance and Sexual Activity   • Alcohol use: Never   • Drug use: Never   • Sexual activity: None   Other Topics Concern   • None   Social History Narrative   • None     Social Determinants of Health     Financial Resource Strain: Not on file   Food Insecurity: Not on file   Transportation Needs: Not on file   Physical Activity: Not on file   Stress: Not on file   Social Connections: Not on file   Intimate Partner Violence: Not on file   Housing Stability: Not on file       Subjective         Objective    Physical Exam:   General Appearance: Awake  Respiratory Pattern: Normal  Chest Assessment: Chest expansion symmetrical  Bilateral Breath Sounds: Clear  Cough: None  O2 Device: 2L nc    Vitals:  Blood pressure 127/60, pulse 90, temperature 97.5 °F (36.4 °C), temperature source Oral, resp. rate 22, height 5' 6.5" (1.689 m), weight 101 kg (223 lb 8.7 oz), SpO2 100 %.           Imaging and other studies:     O2 Device: 2L nc     Plan    Respiratory Plan: No distress/Pulmonary history        Resp Comments: respirations are even and non labored, pt denies ant chronic pulmonary dx, o2 at 2L as needed, no distress noted at this time

## 2023-09-21 NOTE — ASSESSMENT & PLAN NOTE
Patient does report having history of AVM, iron deficiency anemia requiring iron infusion as well as PRBC infusion. Patient reports having history of extensive work-up including upper endoscopy, colonoscopy, capsule endoscopy 2 years ago noted with AVM in [place]. Reports she has been having dark stool. Resented with hemoglobin of 7 and subsequent downtrended to 5.4. Current hemoglobin 6.7 after 1 unit PRBC transfusion yesterday. No signs of active overt bleeding noted. Currently she is hemodynamically stable. will be getting ongoing intubation after this morning. Continue with IV Protonix 40 milligram twice daily. Currently NPO. GI recommendation noted for EGD with push enteroscopy today.

## 2023-09-21 NOTE — ASSESSMENT & PLAN NOTE
Presented with worsening shortness of breath since last 1 day, fever with Tmax of 101 in ED, some chest discomfort. No cough. Does have some orthopnea at baseline. On as needed oxygen at home particularly while traveling. Noted mild leukocytosis. Immunosuppressed with CellCept for IPF. Denies any URT symptoms. Imaging showing baseline IPF with increased interstitial markings suspicion for superimposed infection. Normal procalcitonin. Given presence of fever, immunocompromise status, mild leukocytosis and imaging finding will treat as community-acquired pneumonia. Undetermined whether underlying CHF is having a role in this. BNP WNL. Does have dry crackles. Patient receives her care in [place] with all other subspecialty as well as PCP.     Titrate oxygen to SPO2 of more than 90%  Ceftriaxone 1 g daily, azithromycin daily  Sputum Gram stain culture  Trend fever/leukocyte count  Follow-up pending blood culture  Urine streptococcal and Legionella  Trend procalcitonin  Will consult pulmonary for further recommendations  Continue home Lasix, hold further IV fluid

## 2023-09-21 NOTE — CONSULTS
Consultation - Pulmonary Medicine   [place]u Beach 79 y.o. female MRN: [id number]  Unit/Bed#: ED 29 Encounter: [id number]      Assessment:  1. Shortness of breath  2. Viral vs bacterial pneumonia  3. ILD recently started on CellCept  4. Scleroderma  5. Chronic hypoxemic respiratory failure  6. Chronic iron deficiency anemia    Plan:   Shortness of breath likely due to viral versus less likely bacterial pneumonia in the setting of ILD recently started on CellCept  She does have chronic hypoxic respiratory failure on 2 L at baseline, currently on 4 L although I do not see any documented hypoxia or need for increased O2 requirement  She has scleroderma and follows with rheumatology, not on any directed treatment for the scleroderma  Her CT scan shows honeycombing at the bases with, thickening of interstitial markings, superimposed infection to be considered  She did have a fever of 101 upon presentation, does have some mild leukocytosis, procalcitonin is normal  There was concern for possible CHF given lower extremity edema, BNP is normal, echocardiogram is pending  Currently on ceftriaxone and azithromycin for bacterial pneumonia coverage given she is on CellCept although has only been on for about 3 weeks  If repeat procalcitonin is still negative would discontinue antibiotics  Will check RP2 panel given she is immunocompromised  She follows with pulmonology in [place]  Will continue to follow    History of Present Illness   Physician Requesting Consult: Brian Mitchell MD  Reason for Consult / Principal Problem: Shortness of breath, ILD  Hx and PE limited by: None  HPI: Malu Beach is a 79y.o. year old female former smoker with past medical history of type 1 diabetes, scleroderma, ILD/IPF, CAD who presents with complaint of generalized fatigue, chest pain, cough and shortness of breath.   2 days ago she felt that she was coming down with a cold, yesterday began to have some epigastric and back pain along with worsening shortness of breath and extreme fatigue. She did have a fever upon presentation. Denies any sputum production, no sick contacts although she did take a flight from [place] about 2 weeks ago. She follows with pulmonology in [place]. Also follows with rheumatology but will be establishing with a new rheumatologist in October. She was diagnosed with interstitial lung disease about 5 years ago, had some other complications during that time and did not follow-up with pulmonology until recently. She also has scleroderma but is still awaiting further serology. About 3 weeks ago she did see an ILD specialist in [place] and was started on CellCept. She had trouble titrating up the dose and is back down on the lower dose. Has only been on the CellCept for about 3 weeks. Over time she has noted worsening of her shortness of breath/dyspnea on exertion. She has been on oxygen at 2 L for some time now. Inpatient consult to Pulmonology  Consult performed by: Genny Urbina PA-C  Consult ordered by: Arti Stewart MD          Review of Systems   Constitutional: Positive for fatigue and fever. HENT: Negative. Respiratory: Positive for shortness of breath. Cardiovascular: Negative. Gastrointestinal: Negative. Genitourinary: Negative. Musculoskeletal: Negative. Skin: Negative. Allergic/Immunologic: Negative. Neurological: Negative. Psychiatric/Behavioral: Negative.         Historical Information   Past Medical History:   Diagnosis Date   • Diabetes mellitus ([facility])     type 1   • Heart attack ([address])    • Peripheral arterial disease ([address] )    • Pneumonia      Past Surgical History:   Procedure Laterality Date   • FOOT AMPUTATION       Social History   Social History     Substance and Sexual Activity   Alcohol Use Never     Social History     Substance and Sexual Activity   Drug Use Never     E-Cigarette/Vaping   • E-Cigarette Use Never User      E-Cigarette/Vaping Substances     Social History     Tobacco Use   Smoking Status Never   Smokeless Tobacco Never     Occupational History:     Family History: History reviewed. No pertinent family history.     Meds/Allergies   all current active meds have been reviewed, pertinent pulmonary meds have been reviewed and current meds:   Current Facility-Administered Medications   Medication Dose Route Frequency   • acetaminophen (TYLENOL) tablet 650 mg  650 mg Oral Q6H PRN   • albuterol (PROVENTIL HFA,VENTOLIN HFA) inhaler 2 puff  2 puff Inhalation Q4H PRN   • aspirin (ECOTRIN LOW STRENGTH) EC tablet 81 mg  81 mg Oral Daily   • atorvastatin (LIPITOR) tablet 80 mg  80 mg Oral Daily With Dinner   • azithromycin (ZITHROMAX) tablet 500 mg  500 mg Oral Q24H   • cefTRIAXone (ROCEPHIN) IVPB (premix in dextrose) 2,000 mg 50 mL  2,000 mg Intravenous Q24H   • clopidogrel (PLAVIX) tablet 75 mg  75 mg Oral Daily   • enoxaparin (LOVENOX) subcutaneous injection 40 mg  40 mg Subcutaneous Daily   • furosemide (LASIX) tablet 40 mg  40 mg Oral Daily   • insulin glargine (LANTUS) subcutaneous injection 20 Units 0.2 mL  20 Units Subcutaneous HS   • insulin lispro (HumaLOG) 100 units/mL subcutaneous injection 1-6 Units  1-6 Units Subcutaneous TID AC   • insulin lispro (HumaLOG) 100 units/mL subcutaneous injection 5 Units  5 Units Subcutaneous TID With Meals   • levothyroxine tablet 175 mcg  175 mcg Oral Early Morning   • lisinopril (ZESTRIL) tablet 20 mg  20 mg Oral Daily   • melatonin tablet 3 mg  3 mg Oral HS   • metoprolol succinate (TOPROL-XL) 24 hr tablet 25 mg  25 mg Oral Daily   • mycophenolate (CELLCEPT) capsule 500 mg  500 mg Oral Q12H JONATHAN   • ondansetron (ZOFRAN) injection 4 mg  4 mg Intravenous Q6H PRN   • pantoprazole (PROTONIX) EC tablet 20 mg  20 mg Oral Early Morning   • sodium chloride (PF) 0.9 % injection 3 mL  3 mL Intravenous Q12H [facility]       Allergies   Allergen Reactions   • Daptomycin Anaphylaxis       Objective   Vitals: Blood pressure 141/63, pulse 102, temperature 97.5 °F (36.4 °C), temperature source Oral, resp. rate 18, height 5' 6.5" (1.689 m), weight 101 kg (223 lb 8.7 oz), SpO2 95 %. ,Body mass index is 35.54 kg/m². Intake/Output Summary (Last 24 hours) at 9/21/2023 1110  Last data filed at 9/21/2023 0338  Gross per 24 hour   Intake 50 ml   Output 1070 ml   Net -1020 ml     Invasive Devices     Peripheral Intravenous Line  Duration           Peripheral IV 09/21/23 Right Antecubital <1 day                Physical Exam  Vitals reviewed. Constitutional:       Appearance: Normal appearance. HENT:      Head: Normocephalic and atraumatic. Mouth/Throat:      Pharynx: Oropharynx is clear. Eyes:      Conjunctiva/sclera: Conjunctivae normal.   Cardiovascular:      Rate and Rhythm: Normal rate and regular rhythm. Pulmonary:      Effort: Pulmonary effort is normal. No respiratory distress. Breath sounds: Examination of the right-middle field reveals rales. Examination of the left-middle field reveals rales. Examination of the right-lower field reveals rales. Examination of the left-lower field reveals rales. Rales present. No wheezing or rhonchi. Abdominal:      General: Abdomen is flat. There is no distension. Musculoskeletal:         General: Normal range of motion. Cervical back: Normal range of motion. Right lower leg: Edema present. Left lower leg: Edema present. Skin:     General: Skin is warm and dry. Neurological:      Mental Status: She is alert and oriented to person, place, and time. Psychiatric:         Mood and Affect: Mood normal.         Behavior: Behavior normal.         Lab Results:   I have personally reviewed pertinent lab results. , CBC:   Lab Results   Component Value Date    WBC 10.71 (H) 09/20/2023    HGB 7.0 (L) 09/20/2023    HCT 22.3 (L) 09/20/2023    MCV 95 09/20/2023     (H) 09/20/2023    RBC 2.35 (L) 09/20/2023    MCH 29.8 09/20/2023    MCHC 31.4 09/20/2023    RDW 15.0 09/20/2023    MPV 10.0 09/20/2023    NRBC 0 09/20/2023   , CMP:   Lab Results   Component Value Date    SODIUM 135 09/20/2023    K 4.1 09/20/2023     09/20/2023    CO2 19 (L) 09/20/2023    BUN 49 (H) 09/20/2023    CREATININE 1.29 09/20/2023    CALCIUM 9.2 09/20/2023    AST 15 09/20/2023    ALT 9 09/20/2023    ALKPHOS 57 09/20/2023    EGFR 43 09/20/2023     Imaging Studies: I have personally reviewed pertinent reports. and I have personally reviewed pertinent films in PACS  EKG, Pathology, and Other Studies: I have personally reviewed pertinent reports.     VTE Prophylaxis: Sequential compression device (Venodyne)  and Enoxaparin (Lovenox)    Code Status: Level 1 - Full Code  Advance Directive and Living Will:      Power of :    POLST:

## 2023-09-21 NOTE — ASSESSMENT & PLAN NOTE
History of IPF with pulmonary artery hypertension. Currently on CellCept. Does continue to follow-up with following up with pulmonary in [place]. Pulmonology recommendation appreciated.

## 2023-09-21 NOTE — ASSESSMENT & PLAN NOTE
History of IPF with pulmonary artery hypertension. Currently on CellCept. Does continue to follow-up with following up with pulmonary in [place].

## 2023-09-21 NOTE — PLAN OF CARE
Problem: Potential for Falls  Goal: Patient will remain free of falls  Description: INTERVENTIONS:  - Educate patient/family on patient safety including physical limitations  - Instruct patient to call for assistance with activity   - Consult OT/PT to assist with strengthening/mobility   - Keep Call bell within reach  - Keep bed low and locked with side rails adjusted as appropriate  - Keep care items and personal belongings within reach  - Initiate and maintain comfort rounds  - Make Fall Risk Sign visible to staff  - Offer Toileting every  Hours, in advance of need  - Initiate/Maintain alarm  - Obtain necessary fall risk management equipment:   - Apply yellow socks and bracelet for high fall risk patients  - Consider moving patient to room near nurses station  Outcome: Progressing     Problem: Prexisting or High Potential for Compromised Skin Integrity  Goal: Skin integrity is maintained or improved  Description: INTERVENTIONS:  - Identify patients at risk for skin breakdown  - Assess and monitor skin integrity  - Assess and monitor nutrition and hydration status  - Monitor labs   - Assess for incontinence   - Turn and reposition patient  - Assist with mobility/ambulation  - Relieve pressure over bony prominences  - Avoid friction and shearing  - Provide appropriate hygiene as needed including keeping skin clean and dry  - Evaluate need for skin moisturizer/barrier cream  - Collaborate with interdisciplinary team   - Patient/family teaching  - Consider wound care consult   Outcome: Progressing     Problem: PAIN - ADULT  Goal: Verbalizes/displays adequate comfort level or baseline comfort level  Description: Interventions:  - Encourage patient to monitor pain and request assistance  - Assess pain using appropriate pain scale  - Administer analgesics based on type and severity of pain and evaluate response  - Implement non-pharmacological measures as appropriate and evaluate response  - Consider cultural and social influences on pain and pain management  - Notify physician/advanced practitioner if interventions unsuccessful or patient reports new pain  Outcome: Progressing     Problem: INFECTION - ADULT  Goal: Absence or prevention of progression during hospitalization  Description: INTERVENTIONS:  - Assess and monitor for signs and symptoms of infection  - Monitor lab/diagnostic results  - Monitor all insertion sites, i.e. indwelling lines, tubes, and drains  - Monitor endotracheal if appropriate and nasal secretions for changes in amount and color  - [place] appropriate cooling/warming therapies per order  - Administer medications as ordered  - Instruct and encourage patient and family to use good hand hygiene technique  - Identify and instruct in appropriate isolation precautions for identified infection/condition  Outcome: Progressing  Goal: Absence of fever/infection during neutropenic period  Description: INTERVENTIONS:  - Monitor WBC    Outcome: Progressing     Problem: SAFETY ADULT  Goal: Patient will remain free of falls  Description: INTERVENTIONS:  - Educate patient/family on patient safety including physical limitations  - Instruct patient to call for assistance with activity   - Consult OT/PT to assist with strengthening/mobility   - Keep Call bell within reach  - Keep bed low and locked with side rails adjusted as appropriate  - Keep care items and personal belongings within reach  - Initiate and maintain comfort rounds  - Make Fall Risk Sign visible to staff  - Offer Toileting every  Hours, in advance of need  - Initiate/Maintain alarm  - Obtain necessary fall risk management equipment:   - Apply yellow socks and bracelet for high fall risk patients  - Consider moving patient to room near nurses station  Outcome: Progressing  Goal: Maintain or return to baseline ADL function  Description: INTERVENTIONS:  -  Assess patient's ability to carry out ADLs; assess patient's baseline for ADL function and identify physical deficits which impact ability to perform ADLs (bathing, care of mouth/teeth, toileting, grooming, dressing, etc.)  - Assess/evaluate cause of self-care deficits   - Assess range of motion  - Assess patient's mobility; develop plan if impaired  - Assess patient's need for assistive devices and provide as appropriate  - Encourage maximum independence but intervene and supervise when necessary  - Involve family in performance of ADLs  - Assess for home care needs following discharge   - Consider OT consult to assist with ADL evaluation and planning for discharge  - Provide patient education as appropriate  Outcome: Progressing  Goal: Maintains/Returns to pre admission functional level  Description: INTERVENTIONS:  - Perform BMAT or MOVE assessment daily.   - Set and communicate daily mobility goal to care team and patient/family/caregiver. - Collaborate with rehabilitation services on mobility goals if consulted  - Perform Range of Motion  times a day. - Reposition patient every  hours.   - Dangle patient  times a day  - Stand patient  times a day  - Ambulate patient  times a day  - Out of bed to chair  times a day   - Out of bed for meals times a day  - Out of bed for toileting  - Record patient progress and toleration of activity level   Outcome: Progressing     Problem: DISCHARGE PLANNING  Goal: Discharge to home or other facility with appropriate resources  Description: INTERVENTIONS:  - Identify barriers to discharge w/patient and caregiver  - Arrange for needed discharge resources and transportation as appropriate  - Identify discharge learning needs (meds, wound care, etc.)  - Arrange for interpretive services to assist at discharge as needed  - Refer to Case Management Department for coordinating discharge planning if the patient needs post-hospital services based on physician/advanced practitioner order or complex needs related to functional status, cognitive ability, or social support system  Outcome: Progressing     Problem: Knowledge Deficit  Goal: Patient/family/caregiver demonstrates understanding of disease process, treatment plan, medications, and discharge instructions  Description: Complete learning assessment and assess knowledge base.   Interventions:  - Provide teaching at level of understanding  - Provide teaching via preferred learning methods  Outcome: Progressing

## 2023-09-21 NOTE — ASSESSMENT & PLAN NOTE
Patient presented with fever, shortness of breath, chest discomfort since 1 day prior to presentation. Fever noted to be 101 on presentation. Noted to have tachypnea. Imaging showing increased interstitial marking suspicion for superimposed infection. Pneumonia ruled out. Procalcitonin negative x2. Monitor off antibiotics.

## 2023-09-21 NOTE — ASSESSMENT & PLAN NOTE
A1c 6.6 on 09/18/2023 per care everywhere. Controlled. Taking basal insulin 26 units daily along with lispro average of 8 units with meals.     Continue Lantus at 20 units at bedtime along with lispro 5 units with meals can titrate as needed  Sliding scale coverage  Hypoglycemic protocol  Carbohydrate controlled diet

## 2023-09-21 NOTE — QUICK NOTE
F/u:    Patient does have chronic history of iron deficiency anemia, AVM in the past.  Patient does report having some dark bowel movement at times. Hgb 5.4. No signs of active overt bleeding noted. hemodynamically stable. Will transfuse 1 U PRBC and reassess Hgb trend. Patient is in agreement with above plan. Follow-up with GI consult, start on clear liquid diet, IV currently 40 mg twice daily.

## 2023-09-21 NOTE — ED NOTES
Pt resting comfortably with visitors at bedside; call bel remains within reach; pt and visitors with no needs at this time; will continue to monitor     Nubia Hughes RN  09/21/23 [id number]

## 2023-09-21 NOTE — ASSESSMENT & PLAN NOTE
Last A1c down in [place] appeared to be at 7. Has good control. Taking basal insulin 26 units at bedtime along with lispro average of 8 units with meals.     Will start Lantus at 20 units at bedtime along with lispro 5 units with meals can titrate as needed  Sliding scale coverage  Hypoglycemic protocol  Carbohydrate controlled diet

## 2023-09-21 NOTE — PLAN OF CARE
Problem: OCCUPATIONAL THERAPY ADULT  Goal: Performs self-care activities at highest level of function for planned discharge setting. See evaluation for individualized goals. Description: Treatment Interventions: ADL retraining, Functional transfer training, UE strengthening/ROM, Endurance training, Patient/family training, Compensatory technique education, Activityengagement          See flowsheet documentation for full assessment, interventions and recommendations. Note: Limitation: Decreased ADL status, Decreased UE strength, Decreased endurance, Decreased self-care trans, Decreased high-level ADLs, Decreased UE ROM  Prognosis: Good  Assessment: Patient is a 79 y.o. female seen for OT evaluation s/p admit to [facility]  on 9/20/2023 w/Acute on chronic respiratory failure with hypoxia ([address]). Commorbidities affecting patient's functional performance at time of assessment include: CAD, IPF, hypothyroid, HTN, DM1, and pneumonia. Orders placed for OT evaluation and treatment and up and OOB as tolerated . Performed at least two patient identifiers during session including name and wristband. Prior to admission, Patient reporting being independent with ADLs, ambulatory with rollator, and lives with significant other. Personal factors affecting patient at time of initial evaluation include: steps to enter, difficulty performing ADLs and difficulty performing IADLs. Upon evaluation, patient requires supervision and minimal  assist for UB ADLs, moderate assist for LB ADLs, transfers and functional ambulation in room and bathroom with minimal  assist, with the use of Rolling Walker. Patient is oriented x 4.   Occupational performance is affected by the following deficits: decreased functional reach, limited active ROM, decreased muscle strength, dynamic sit/ stand balance deficit with poor standing tolerance time for self care and functional mobility, decreased activity tolerance and delayed righting and equilibrium reactions. Patient to benefit from continued Occupational Therapy treatment while in the hospital to address deficits as defined above and maximize level of functional independence with ADLs and functional mobility. Occupational Performance areas to address include: grooming , bathing/ shower, dressing, toilet hygiene, transfer to all surfaces, functional ambulation, medication routine/ management, IADLS: Household maintenance and IADLs: safety procedures. From OT standpoint, recommendation at time of d/c would be Post acute rehabilitation services.      OT Discharge Recommendation: Post acute rehabilitation services     [facility] OTD, OTR/L

## 2023-09-21 NOTE — ED NOTES
Per CT staff, pts line is not working, pt needs another IV.      IV attempted by this RN , unsuccessful, will have another RN attempt ultrasound guided insertion     Josei Trotter  09/21/23 0152

## 2023-09-21 NOTE — ASSESSMENT & PLAN NOTE
70-year-old female past medical history of interstitial lung disease, scleroderma, chronic home O2 dependent 2 L at baseline, recently started on CellCept 3 weeks ago presented with complaining of overall not feeling well, shortness of breath, fever. Patient was empirically started on ceftriaxone however procalcitonin negative x2. COVID-19/RSV/flu negative. Respiratory panel negative. Presented with mild leukocytosis, currently not leukopenic. Hemoglobin 7.0 on admission downtrending to 5.4  Currently 6.7 after chronic PRBC transfusion. Currently negative 2300cc balance. Now resolved. O2 sat well on RA. Currently she is afebrile, hemodynamically stable. Acute nontoxic-appearing. O2 saturation 95 to 98% on room air. Likely multifactorial in settings of chronic interstitial lung disease as well as acute on chronic anemia causing her symptoms. Discontinue antibiotics and monitor off of IV antibiotics. Pulmonology recommendation appreciated.

## 2023-09-21 NOTE — ED PROVIDER NOTES
Pt Name: Ap Mendoza  MRN: [id number]  [address] 1955  Age/Sex: 79 y.o. female  Date of evaluation: 9/20/2023  PCP: Justin Samano MD    [address]    Chief Complaint   Patient presents with   • Shortness of Breath     Pt presents to ER with increased SOB all day, lung history and generally feeling ill. Fever of 101 per ems            HPI    79 y.o. female presenting with shortness of breath. Patient has had a cough and cold symptoms over the past 2 days, today began having worsening shortness of breath. She has a history of interstitial lung disease secondary to scleroderma, takes CellCept for same, does not typically take steroids due to type 1 diabetes and labile blood sugar. Patient also has a history of congestive heart failure. She also notes swelling of both legs. Tmax 101 with that temperature measured by EMS prior to arrival.  She denies chest pain, trauma, numbness, weakness, other symptoms. Patient has a history of anemia, most recent hemoglobin 7.6. She was transfused twice last month, denies any recent bleeding. HPI      Past Medical and Surgical History    Past Medical History:   Diagnosis Date   • Diabetes mellitus ([address])     type 1   • Heart attack ([address])    • Peripheral arterial disease ([address])    • Pneumonia        Past Surgical History:   Procedure Laterality Date   • FOOT AMPUTATION         History reviewed. No pertinent family history. Social History     Tobacco Use   • Smoking status: Never   • Smokeless tobacco: Never   Vaping Use   • Vaping Use: Never used   Substance Use Topics   • Alcohol use: Never   • Drug use: Never           Allergies    Allergies   Allergen Reactions   • Daptomycin Anaphylaxis       Home Medications    Prior to Admission medications    Not on File           Review of Systems    Review of Systems   Constitutional: Positive for fatigue and fever. Negative for activity change and chills. HENT: Negative for drooling and facial swelling. Eyes: Negative for pain, discharge and visual disturbance. Respiratory: Positive for cough and shortness of breath. Negative for apnea, chest tightness and wheezing. Cardiovascular: Positive for leg swelling. Negative for chest pain. Gastrointestinal: Negative for abdominal pain, constipation, diarrhea, nausea and vomiting. Genitourinary: Negative for difficulty urinating, dysuria and urgency. Musculoskeletal: Negative for arthralgias, back pain and gait problem. Skin: Negative for color change and rash. Neurological: Negative for dizziness, speech difficulty, weakness and headaches. Psychiatric/Behavioral: Negative for agitation, behavioral problems and confusion. All other systems reviewed and negative. Physical Exam      ED Triage Vitals   Temperature Pulse Respirations Blood Pressure SpO2   09/20/23 2234 09/20/23 2234 09/20/23 2234 09/20/23 2234 09/20/23 2235   (!) 101 °F (38.3 °C) 98 (!) 41 (!) 208/86 96 %      Temp Source Heart Rate Source Patient Position - Orthostatic VS BP Location FiO2 (%)   09/20/23 2234 09/20/23 2235 09/20/23 2235 09/20/23 2235 --   Oral Monitor Sitting Left arm       Pain Score       09/20/23 2259       8               Physical Exam  Vitals and nursing note reviewed. Constitutional:       General: She is in acute distress. Appearance: She is well-developed. She is ill-appearing. HENT:      Head: Normocephalic and atraumatic. Right Ear: External ear normal.      Left Ear: External ear normal.      Nose: Nose normal. No congestion or rhinorrhea. Mouth/Throat:      Mouth: Mucous membranes are moist.      Pharynx: Oropharynx is clear. Eyes:      Conjunctiva/sclera: Conjunctivae normal.      Pupils: Pupils are equal, round, and reactive to light. Cardiovascular:      Rate and Rhythm: Normal rate and regular rhythm. Pulses: Normal pulses. Heart sounds: Normal heart sounds. No murmur heard. No friction rub. No gallop. Pulmonary:      Effort: Respiratory distress present. Breath sounds: Rales present. No wheezing. Comments: Tachypneic, in respiratory distress, bibasilar crackles noted with decreased breath sounds more on right than left. Abdominal:      General: There is no distension. Palpations: Abdomen is soft. Tenderness: There is no abdominal tenderness. There is no guarding or rebound. Musculoskeletal:         General: No deformity. Normal range of motion. Cervical back: Normal range of motion and neck supple. Right lower leg: Edema present. Left lower leg: Edema present. Comments: 1+ pitting edema bilateral lower extremities   Skin:     General: Skin is warm and dry. Capillary Refill: Capillary refill takes less than 2 seconds. Findings: No erythema or rash. Neurological:      Mental Status: She is alert and oriented to person, place, and time. Psychiatric:         Behavior: Behavior normal.         Thought Content: Thought content normal.         Judgment: Judgment normal.              Diagnostic Results    EKG Interpretation    Rate:  95  BPM  Rhythm:  Normal Sinus Rhythm   Axis:  Normal   Intervals: Normal, no blocks, QTc  389 ms  Q waves:  No pathologic Q waves   T waves: Inverted in inferior and lateral leads  ST segments:  No significant elevations or depressions     Impression:  Normal sinus rhythm with T wave inversion inferior and lateral leads concerning for possible ischemia but without evidence of acute ischemia or significant arrhythmia      EKG for comparison: None available    EKG interpreted by me.      Labs:    Results Reviewed     Procedure Component Value Units Date/Time    HS Troponin I 2hr [[id number]]  (Normal) Collected: 09/21/23 0044    Lab Status: Final result Specimen: Blood from Arm, Right Updated: 09/21/23 0115     hs TnI 2hr 5 ng/L      Delta 2hr hsTnI -1 ng/L     B-Type Natriuretic Peptide(BNP) [[id number]]  (Normal) Collected: 09/20/23 2254    Lab Status: Final result Specimen: Blood from Arm, Right Updated: 09/21/23 0007     BNP 65 pg/mL     HS Troponin I 4hr [[id number]]     Lab Status: No result Specimen: Blood     FLU/RSV/COVID - if FLU/RSV clinically relevant [[id number]]  (Normal) Collected: 09/20/23 2311    Lab Status: Final result Specimen: Nares from Nose Updated: 09/21/23 0000     SARS-CoV-2 Negative     INFLUENZA A PCR Negative     INFLUENZA B PCR Negative     RSV PCR Negative    Narrative:      FOR PEDIATRIC PATIENTS - copy/paste COVID Guidelines URL to browser: [email or web address]    SARS-CoV-2 assay is a Nucleic Acid Amplification assay intended for the  qualitative detection of nucleic acid from SARS-CoV-2 in nasopharyngeal  swabs. Results are for the presumptive identification of SARS-CoV-2 RNA. Positive results are indicative of infection with SARS-CoV-2, the virus  causing COVID-19, but do not rule out bacterial infection or co-infection  with other viruses. Laboratories within the [facility] and its  territories are required to report all positive results to the appropriate  public health authorities. Negative results do not preclude SARS-CoV-2  infection and should not be used as the sole basis for treatment or other  patient management decisions. Negative results must be combined with  clinical observations, patient history, and epidemiological information. This test has not been FDA cleared or approved. This test has been authorized by FDA under an Emergency Use Authorization  (EUA). This test is only authorized for the duration of time the  declaration that circumstances exist justifying the authorization of the  emergency use of an in vitro diagnostic tests for detection of SARS-CoV-2  virus and/or diagnosis of COVID-19 infection under section 564(b)(1) of  the Act, 21 U. S.C. 061TSN-1(S)(2), unless the authorization is terminated  or revoked sooner.  The test has been validated but independent review by FDA  and CLIA is pending. Test performed using [employer] GeneXpert: This RT-PCR assay targets N2,  a region unique to SARS-CoV-2. A conserved region in the E-gene was chosen  for pan-Sarbecovirus detection which includes SARS-CoV-2. According to CMS-2020-01-R, this platform meets the definition of high-throughput technology.     Procalcitonin [[id number]]  (Normal) Collected: 09/20/23 2255    Lab Status: Final result Specimen: Blood from Arm, Right Updated: 09/20/23 2332     Procalcitonin 0.09 ng/ml     HS Troponin 0hr (reflex protocol) [[id number]]  (Normal) Collected: 09/20/23 2254    Lab Status: Final result Specimen: Blood from Arm, Right Updated: 09/20/23 2329     hs TnI 0hr 6 ng/L     Comprehensive metabolic panel [[id number]]  (Abnormal) Collected: 09/20/23 2255    Lab Status: Final result Specimen: Blood from Arm, Right Updated: 09/20/23 2320     Sodium 135 mmol/L      Potassium 4.1 mmol/L      Chloride 106 mmol/L      CO2 19 mmol/L      ANION GAP 10 mmol/L      BUN 49 mg/dL      Creatinine 1.29 mg/dL      Glucose 117 mg/dL      Calcium 9.2 mg/dL      AST 15 U/L      ALT 9 U/L      Alkaline Phosphatase 57 U/L      Total Protein 6.6 g/dL      Albumin 4.1 g/dL      Total Bilirubin 0.26 mg/dL      eGFR 43 ml/min/1.73sq m     Narrative:      [place] guidelines for Chronic Kidney Disease (CKD):   •  Stage 1 with normal or high GFR (GFR > 90 mL/min/1.73 square meters)  •  Stage 2 Mild CKD (GFR = 60-89 mL/min/1.73 square meters)  •  Stage 3A Moderate CKD (GFR = 45-59 mL/min/1.73 square meters)  •  Stage 3B Moderate CKD (GFR = 30-44 mL/min/1.73 square meters)  •  Stage 4 Severe CKD (GFR = 15-29 mL/min/1.73 square meters)  •  Stage 5 End Stage CKD (GFR <15 mL/min/1.73 square meters)  Note: GFR calculation is accurate only with a steady state creatinine    Lactic acid [[id number]]  (Normal) Collected: 09/20/23 8048    Lab Status: Final result Specimen: Blood from Arm, Right Updated: 09/20/23 2319     LACTIC ACID 1.7 mmol/L     Narrative:      Result may be elevated if tourniquet was used during collection. Blood culture #1 [[id number]] Collected: 09/20/23 2311    Lab Status: In process Specimen: Blood from Arm, Right Updated: 09/20/23 2317    Protime-INR [[id number]]  (Normal) Collected: 09/20/23 2255    Lab Status: Final result Specimen: Blood from Arm, Right Updated: 09/20/23 2316     Protime 13.4 seconds      INR 0.96    APTT [[id number]]  (Normal) Collected: 09/20/23 2255    Lab Status: Final result Specimen: Blood from Arm, Right Updated: 09/20/23 2316     PTT 28 seconds     CBC and differential [[id number]]  (Abnormal) Collected: 09/20/23 2254    Lab Status: Final result Specimen: Blood from Arm, Right Updated: 09/20/23 2307     WBC 10.71 Thousand/uL      RBC 2.35 Million/uL      Hemoglobin 7.0 g/dL      Hematocrit 22.3 %      MCV 95 fL      MCH 29.8 pg      MCHC 31.4 g/dL      RDW 15.0 %      MPV 10.0 fL      Platelets 113 Thousands/uL      nRBC 0 /100 WBCs      Neutrophils Relative 75 %      Immat GRANS % 0 %      Lymphocytes Relative 15 %      Monocytes Relative 7 %      Eosinophils Relative 2 %      Basophils Relative 1 %      Neutrophils Absolute 8.01 Thousands/µL      Immature Grans Absolute 0.04 Thousand/uL      Lymphocytes Absolute 1.58 Thousands/µL      Monocytes Absolute 0.77 Thousand/µL      Eosinophils Absolute 0.25 Thousand/µL      Basophils Absolute 0.06 Thousands/µL     Blood gas, Venous [[id number]]  (Abnormal) Collected: 09/20/23 2254    Lab Status: Final result Specimen: Blood from Arm, Right Updated: 09/20/23 2304     pH, Bryce 7.344     pCO2, Bryce 37.6 mm Hg      pO2, Bryce 32.7 mm Hg      HCO3, Bryce 20.0 mmol/L      Base Excess, Bryce -5.2 mmol/L      O2 Content, Bryce 6.8 ml/dL      O2 HGB, VENOUS 59.3 %     Blood culture #2 [[id number]] Collected: 09/20/23 2255    Lab Status:  In process Specimen: Blood from Arm, Right Updated: 09/20/23 2300 Fingerstick Glucose (POCT) [[id number]]  (Abnormal) Collected: 09/20/23 2238    Lab Status: Final result Updated: 09/20/23 2238     POC Glucose 141 mg/dl     UA w Reflex to Microscopic w Reflex to Culture [[id number]]     Lab Status: No result Specimen: Urine           All labs reviewed and utilized in the medical decision making process    Radiology:    XR chest portable    (Results Pending)   CT chest with contrast    (Results Pending)       All radiology studies independently viewed by me and interpreted by the radiologist.    Procedure    CriticalCare Time    Date/Time: 9/21/2023 1:48 AM    Performed by: Keeley Gale MD  Authorized by: Keeley Gale MD    Critical care provider statement:     Critical care time (minutes):  45    Critical care time was exclusive of:  Separately billable procedures and treating other patients and teaching time    Critical care was necessary to treat or prevent imminent or life-threatening deterioration of the following conditions:  Respiratory failure and cardiac failure    Critical care was time spent personally by me on the following activities:  Obtaining history from patient or surrogate, development of treatment plan with patient or surrogate, discussions with consultants, evaluation of patient's response to treatment, examination of patient, ordering and performing treatments and interventions, ordering and review of laboratory studies, re-evaluation of patient's condition, ordering and review of radiographic studies and review of old charts            ED Course of Care and Re-Assessments      On initial arrival, patient has been treated with DuoNeb with minimal response by EMS, no significant wheezing noted on examination. Noted to be hypertensive with swollen lower extremities and crackles in chest, given nitroglycerin and diuresis started with concern for CHF as a contributor.   Patient also started on broad-spectrum antibiotics due to fever with concern for possible pneumonia. Transfusion was considered but not initiated at this time, patient appears clinically volume overloaded and symptoms resolved after initial treatment with nitrates and diuretics, patient at significant risk for TACO, plan form for close observation as well as repeat hemoglobin, some suspicion that drop in hemoglobin is delusional due to volume overload and will improve after treatment with diuretics. At any rate, once patient's volume status has been corrected, she would be better able to tolerate a transfusion. Medications   sodium chloride (PF) 0.9 % injection 3 mL (3 mL Intravenous Given 9/20/23 2339)   nitroglycerin (NITRO-BID) 2 % TD ointment 1 inch (1 inch Topical Given 9/20/23 2259)   acetaminophen (TYLENOL) tablet 975 mg (975 mg Oral Given 9/20/23 2259)   ipratropium-albuterol (FOR EMS ONLY) (DUO-NEB) 0.5-2.5 mg/3 mL inhalation solution 6 mL (0 mL Does not apply Given to EMS 9/20/23 2251)   fentanyl citrate (PF) 100 MCG/2ML 50 mcg (50 mcg Intravenous Given 9/20/23 2259)   ondansetron (ZOFRAN) injection 4 mg (4 mg Intravenous Given 9/20/23 2339)   furosemide (LASIX) injection 40 mg (40 mg Intravenous Given 9/20/23 2344)   cefepime (MAXIPIME) IVPB (premix in dextrose) 2,000 mg 50 mL (2,000 mg Intravenous New Bag 9/21/23 0037)   iohexol (OMNIPAQUE) 350 MG/ML injection (SINGLE-DOSE) 100 mL (100 mL Intravenous Given 9/21/23 0058)           FINAL IMPRESSION    Final diagnoses:   Interstitial lung disease (HCC)   CHF (congestive heart failure) (HCC)   SOB (shortness of breath)   Anemia   Hypertension   Acute hypoxemic respiratory failure (HCC)         DISPOSITION/PLAN    Presentation as above with acute hypoxemic respiratory failure in the setting of interstitial lung disease, CHF, anemia, hypertension. Infectious contributor is also strongly suspected based on fever.   After initial treatment in emergency department as well as initiation of broad-spectrum antibiotics, patient much improved and stabilized. Low suspicion for ACS, PE, aortic dissection, pneumothorax at this time. Admitted to internal medicine for further care, hemodynamically stable and comfortable at time. CT chest with IV contrast ordered per internal medicine request, those results pending at time of admit to be followed up by internal medicine team.  Time reflects when diagnosis was documented in both MDM as applicable and the Disposition within this note     Time User Action Codes Description Comment    9/21/2023 12:30 AM Lula [place] T Add [J84.9] Interstitial lung disease ([address])     9/21/2023 12:30 AM Lula [place] T Add [I50.9] CHF (congestive heart failure) ([address])     9/21/2023 12:30 AM Lula [place] T Add [R06.02] SOB (shortness of breath)     9/21/2023 12:31 AM Lula [place] T Add [D64.9] Anemia     9/21/2023 12:31 AM Tilmon Hefty Add [I10] Hypertension     9/21/2023 12:32 AM Tilmon Hefty Add [J96.01] Acute hypoxemic respiratory failure [facility])       ED Disposition     ED Disposition   Admit    Condition   Stable    Date/Time   Thu Sep 21, 2023 12:30 AM    Comment   Case was discussed with DIMPLE and the patient's admission status was agreed to be Admission Status: observation status to the service of Dr. Urvashi Hernandez . Follow-up Information    None           PATIENT REFERRED TO:    No follow-up provider specified. DISCHARGE MEDICATIONS:    Patient's Medications    No medications on file       No discharge procedures on file. William Mitchell MD    Portions of the record may have been created with voice recognition software. Occasional wrong word or "sound alike" substitutions may have occurred due to the inherent limitations of voice recognition software.   Please read the chart carefully and recognize, using context, where substitutions have occurred     William Mitchell MD  09/21/23 0150

## 2023-09-21 NOTE — H&P
[address]  H&P  Name: Ap Mendoza 79 y.o. female I MRN: [id number]  Unit/Bed#: ED 29 I Date of Admission: 9/20/2023   Date of Service: 9/21/2023 I Hospital Day: 0      Assessment/Plan   * Acute on chronic respiratory failure with hypoxia [facility])  Assessment & Plan  Presented with worsening shortness of breath since last 1 day, fever with Tmax of 101 in ED, some chest discomfort. No cough. Does have some orthopnea at baseline. On as needed oxygen at home particularly while traveling. Noted mild leukocytosis. Immunosuppressed with CellCept for IPF. Denies any URT symptoms. Imaging showing baseline IPF with increased interstitial markings suspicion for superimposed infection. Normal procalcitonin. Given presence of fever, immunocompromise status, mild leukocytosis and imaging finding will treat as community-acquired pneumonia. Undetermined whether underlying CHF is having a role in this. BNP WNL. Does have dry crackles. Patient receives her care in [place] with all other subspecialty as well as PCP. Titrate oxygen to SPO2 of more than 90%  Ceftriaxone and azithromycin  Sputum Gram stain culture  Trend fever/leukocyte count  Follow-up pending blood culture  Urine streptococcal and Legionella  Trend procalcitonin  Will consult pulmonary for further recommendations  Continue home Lasix, hold further IV fluid    Anemia  Assessment & Plan  Hemoglobin at 7. Does have history of chronic iron deficiency anemia. Had extensive work-up with gastroenterology done in [place] including capsule endoscopy which was unremarkable and found to have AVM. Had bone marrow biopsy in [place] which was normal as per patient. Does have hematologist in [place]. Not actively bleeding currently. Had brown bowel movement today. Continue following up with hematology and gastroenterology in [place].   Trend hemoglobin  Goal of transfusion less than 7  Obtain type and screen and morning  Blood transfusion consent obtained and in chart. Pneumonia  Assessment & Plan  Patient presented with fever, shortness of breath, chest discomfort since 1 day prior to presentation. Fever noted to be 101 on presentation. Noted to have tachypnea. Imaging showing increased interstitial marking suspicion for superimposed infection. See plan as in acute hypoxic respiratory failure. Type 1 diabetes mellitus without complication (HCC)  Assessment & Plan  Last A1c down in [place] appeared to be at 7. Has good control. Taking basal insulin 26 units at bedtime along with lispro average of 8 units with meals. Will start Lantus at 20 units at bedtime along with lispro 5 units with meals can titrate as needed  Sliding scale coverage  Hypoglycemic protocol  Carbohydrate controlled diet    Hypertension  Assessment & Plan  Continue lisinopril, metoprolol. Hypothyroid  Assessment & Plan  Continue levothyroxine. Sees endocrinology in [place]. IPF (idiopathic pulmonary fibrosis) ([facility])  Assessment & Plan  History of IPF with pulmonary artery hypertension. Currently on CellCept. Does continue to follow-up with following up with pulmonary in [place]. CAD S/P percutaneous coronary angioplasty  Assessment & Plan  Continue aspirin, Plavix, statin, metoprolol, lisinopril            VTE Prophylaxis: Enoxaparin (Lovenox)  / sequential compression device and foot pump applied   Code Status: Level 1 full code  POLST: There is no POLST form on file for this patient (pre-hospital)  Discussion with family: None    Anticipated Length of Stay:  Patient will be admitted on an Inpatient basis with an anticipated length of stay of more than 2 midnights. Justification for Hospital Stay: Bilateral lower lobe pneumonia    Total Time for Visit, including Counseling / Coordination of Care: 45 minutes. Greater than 50% of this total time spent on direct patient counseling and coordination of care.     Chief Complaint:   Shortness of breath, cough, substernal chest discomfort since last 1 day, fever since 1 day. History of Present Illness:    Sakshi Velásquez is a 79 y.o. female with past medical history of IPF on CellCept, pulmonary artery hypertension, CAD s/p PCI, hypertension, type I DM on insulin, hypothyroidism, chronic anemia who presents with worsening shortness of breath, chest discomfort, fever since last 1 day. Shortness of breath reported with exertion as well as on lying down. Had episode of fever with Tmax of 101 in ED as well. Denies any nausea vomiting. No chest pain. Just chest discomfort substernal in location not radiating anywhere. No dizziness, palpitations, visual disturbances, headache, vertigo. No abdominal pain, diarrhea, urinary symptoms. Review of Systems:    Review of Systems   Constitutional: Positive for activity change, appetite change and fatigue. Negative for chills, diaphoresis, fever and unexpected weight change. HENT: Negative for rhinorrhea and sore throat. Eyes: Negative for visual disturbance. Respiratory: Positive for cough, chest tightness and shortness of breath. Cardiovascular: Negative for chest pain, palpitations and leg swelling. Gastrointestinal: Negative for abdominal distention, abdominal pain, blood in stool, constipation, diarrhea, nausea and vomiting. Genitourinary: Negative for difficulty urinating, flank pain, hematuria and urgency. Musculoskeletal: Negative for arthralgias. Neurological: Negative for dizziness, weakness and headaches. Psychiatric/Behavioral: Negative for behavioral problems and sleep disturbance.        Past Medical and Surgical History:     Past Medical History:   Diagnosis Date   • Diabetes mellitus ([address] )     type 1   • Heart attack ([address])    • Peripheral arterial disease ([address])    • Pneumonia        Past Surgical History:   Procedure Laterality Date   • FOOT AMPUTATION         Meds/Allergies:    Prior to Admission medications    Not on File I have reviewed home medications with patient personally. Allergies: Allergies   Allergen Reactions   • Daptomycin Anaphylaxis       Social History:     Marital Status: /Civil Union   Substance Use History:   Social History     Substance and Sexual Activity   Alcohol Use Never     Social History     Tobacco Use   Smoking Status Never   Smokeless Tobacco Never     Social History     Substance and Sexual Activity   Drug Use Never       Family History:    History reviewed. No pertinent family history. Physical Exam:     Vitals:   Blood Pressure: 127/60 (09/21/23 0500)  Pulse: 90 (09/21/23 0500)  Temperature: 97.5 °F (36.4 °C) (09/20/23 2334)  Temp Source: Oral (09/20/23 2334)  Respirations: 22 (09/21/23 0500)  Height: 5' 6.5" (168.9 cm) (09/20/23 2235)  Weight - Scale: 101 kg (223 lb 8.7 oz) (09/20/23 2235)  SpO2: 100 % (09/21/23 0500)    Physical Exam  Vitals reviewed. Constitutional:       General: She is in acute distress. Appearance: She is obese. HENT:      Head: Normocephalic and atraumatic. Eyes:      General: No scleral icterus. Right eye: No discharge. Left eye: No discharge. Cardiovascular:      Rate and Rhythm: Regular rhythm. Tachycardia present. Pulses: Normal pulses. Heart sounds: Normal heart sounds. Pulmonary:      Effort: Respiratory distress present. Breath sounds: Rales (Dry crackles in bilateral lower lobes) present. No wheezing. Chest:      Chest wall: No tenderness. Abdominal:      General: Abdomen is flat. Bowel sounds are normal. There is no distension. Palpations: Abdomen is soft. Tenderness: There is no abdominal tenderness. Musculoskeletal:         General: No deformity. Normal range of motion. Cervical back: Normal range of motion and neck supple. Right lower leg: No edema. Left lower leg: No edema. Skin:     General: Skin is warm. Coloration: Skin is not jaundiced or pale.       Findings: No rash.   Neurological:      General: No focal deficit present. Mental Status: She is alert and oriented to person, place, and time. Mental status is at baseline. Cranial Nerves: No cranial nerve deficit. Motor: No weakness. Psychiatric:         Mood and Affect: Mood normal.         Behavior: Behavior normal.       Additional Data:     Lab Results: I have personally reviewed pertinent reports. Results from last 7 days   Lab Units 09/20/23  2254   WBC Thousand/uL 10.71*   HEMOGLOBIN g/dL 7.0*   HEMATOCRIT % 22.3*   PLATELETS Thousands/uL 469*   NEUTROS PCT % 75   LYMPHS PCT % 15   MONOS PCT % 7   EOS PCT % 2     Results from last 7 days   Lab Units 09/20/23  2255   SODIUM mmol/L 135   POTASSIUM mmol/L 4.1   CHLORIDE mmol/L 106   CO2 mmol/L 19*   BUN mg/dL 49*   CREATININE mg/dL 1.29   ANION GAP mmol/L 10   CALCIUM mg/dL 9.2   ALBUMIN g/dL 4.1   TOTAL BILIRUBIN mg/dL 0.26   ALK PHOS U/L 57   ALT U/L 9   AST U/L 15   GLUCOSE RANDOM mg/dL 117     Results from last 7 days   Lab Units 09/20/23  2255   INR  0.96     Results from last 7 days   Lab Units 09/20/23  2238   POC GLUCOSE mg/dl 141*         Results from last 7 days   Lab Units 09/20/23  2255 09/20/23  2254   LACTIC ACID mmol/L  --  1.7   PROCALCITONIN ng/ml 0.09  --        Imaging: I have personally reviewed pertinent reports. and I have personally reviewed pertinent films in PACS    CT chest with contrast   Final Result by Erich Barnett DO (09/21 5714)      Scattered pulmonary and paraseptal emphysematous changes. There is thickening of the interstitial markings with honeycombing seen in the bilateral lower lung fields and in the periphery, findings could be seen with UIP/idiopathic pulmonary fibrosis. Superimposed infection, especially in the lower lung fields considered in the appropriate clinical setting. Correlation with the patient's symptoms and laboratory values recommended no discrete focal consolidation is identified. Small pericardial effusion, moderate coronary atherosclerosis, shotty nonspecific hilar and mediastinal lymph nodes, and other findings as above. Workstation performed: [id number]         XR chest portable    (Results Pending)       EKG, Pathology, and Other Studies Reviewed on Admission:   · EKG: Normal sinus rhythm, sinus tachycardia    Allscripts / Epic Records Reviewed: Yes     ** Please Note: This note has been constructed using a voice recognition system.  **

## 2023-09-21 NOTE — RESPIRATORY THERAPY NOTE
RT Protocol Note  Mirta Chong 79 y.o. female MRN: [id number]  Unit/Bed#: ED 29 Encounter: [id number]    Assessment    Principal Problem:    Acute on chronic respiratory failure with hypoxia [facility])  Active Problems:    CAD S/P percutaneous coronary angioplasty    IPF (idiopathic pulmonary fibrosis) (HCC)    Hypothyroid    Hypertension    Type 1 diabetes mellitus without complication (HCC)    Pneumonia    Anemia      Home Pulmonary Medications:     09/21/23 1122   Respiratory Protocol   Protocol Initiated? No   Protocol Selection Respiratory   Language Barrier? No   Medical & Social History Reviewed? Yes   Diagnostic Studies Reviewed? Yes   Physical Assessment Performed?  Yes   Home Devices/Therapy Home O2   Respiratory Plan No distress/Pulmonary history   Respiratory Assessment   Bilateral Breath Sounds Clear   O2 Device nc   Additional Assessments   Pulse 93   Respirations 20   SpO2 98 %       Home Devices/Therapy: Home O2    Past Medical History:   Diagnosis Date    Diabetes mellitus ([address])     type 1    Heart attack ([address])     Peripheral arterial disease (HCC)     Pneumonia      Social History     Socioeconomic History    Marital status: /Civil Union     Spouse name: None    Number of children: None    Years of education: None    Highest education level: None   Occupational History    None   Tobacco Use    Smoking status: Never    Smokeless tobacco: Never   Vaping Use    Vaping Use: Never used   Substance and Sexual Activity    Alcohol use: Never    Drug use: Never    Sexual activity: None   Other Topics Concern    None   Social History Narrative    None     Social Determinants of Health     Financial Resource Strain: Not on file   Food Insecurity: Not on file   Transportation Needs: Not on file   Physical Activity: Not on file   Stress: Not on file   Social Connections: Not on file   Intimate Partner Violence: Not on file   Housing Stability: Not on file       Subjective         Objective    Physical Exam:   General Appearance: Awake  Respiratory Pattern: Normal  Chest Assessment: Chest expansion symmetrical  Bilateral Breath Sounds: Clear  Cough: None  O2 Device: nc    Vitals:  Blood pressure 137/63, pulse 93, temperature 97.5 °F (36.4 °C), temperature source Oral, resp. rate 20, height 5' 6.5" (1.689 m), weight 101 kg (223 lb 8.7 oz), SpO2 98 %. Imaging and other studies: I have personally reviewed pertinent reports.       O2 Device: nc     Plan    Respiratory Plan: No distress/Pulmonary history        Resp Comments: respirations are even and non labored, pt denies ant chronic pulmonary dx, o2 at 2L as needed,

## 2023-09-21 NOTE — PHYSICAL THERAPY NOTE
Physical Therapy Evaluation     Patient's Name: Brenda Hernandez    Admitting Diagnosis  SOB (shortness of breath) [R06.02]    Problem List  Patient Active Problem List   Diagnosis    CAD S/P percutaneous coronary angioplasty    IPF (idiopathic pulmonary fibrosis) (HCC)    Hypothyroid    Hypertension    Type 1 diabetes mellitus without complication ([address])    Pneumonia    Acute on chronic respiratory failure with hypoxia ([address])    Anemia     Past Medical History  Past Medical History:   Diagnosis Date    Diabetes mellitus ([address])     type 1    Heart attack ([address])     Peripheral arterial disease ([address])     Pneumonia      Past Surgical History  Past Surgical History:   Procedure Laterality Date    FOOT AMPUTATION        09/21/23 0813   PT Last Visit   PT Visit Date 09/21/23   Note Type   Note type Evaluation   Pain Assessment   Pain Assessment Tool 0-10   Pain Score No Pain   Restrictions/Precautions   Weight Bearing Precautions Per Order No   Braces or Orthoses Other (Comment)  (shoe insert for R LE)   Other Precautions Chair Alarm; Bed Alarm;Multiple lines;Telemetry;O2;Fall Risk  (+2L O2 via NC)   Home Living   Type of [address] [place] [address] Two level; Able to live on main level with bedroom/bathroom; Performs ADLs on one level;Stairs to enter with rails  (4 RAJINDER)   Bathroom Shower/Tub Walk-in shower   Bathroom Toilet Raised   Bathroom Equipment Shower chair;Grab bars in shower;Grab bars around toilet   [address] aid;Reacher; Other (Comment)  (rollator; home O2 PRN)   Additional Comments Pt ambulates with a rollator. Prior Function   Level of [place] Independent with functional mobility; Independent with ADLs   Lives With Significant other  ("Tam")   Receives Help From Family;Friend(s)   IADLs Family/Friend/Other provides transportation; Family/Friend/Other provides meals; Independent with medication management   Falls in the last 6 months 0   Vocational Retired   Comments Pt has house in [place] and [place]; recently returned to [email or web address] Present No   Cognition   Overall Cognitive Status WFL   Arousal/Participation Alert   Orientation Level Oriented X4   Memory Within functional limits   Following Commands Follows all commands and directions without difficulty   Comments Pt agreeable to PT. Subjective   Subjective "I've been in bed all night."   RLE Assessment   RLE Assessment X   Strength RLE   RLE Overall Strength 4-/5   LLE Assessment   LLE Assessment X   Strength LLE   LLE Overall Strength 4-/5   Vision-Basic Assessment   Current Vision Wears glasses only for reading   Visual History Cataracts   Light Touch   RLE Light Touch Impaired   RLE Light Touch Comments neuropathy   LLE Light Touch Impaired   LLE Light Touch Comments neuropathy   Bed Mobility   Supine to Sit 4  Minimal assistance   Additional items Assist x 1;HOB elevated; Increased time required;Verbal cues;LE management   Sit to Supine 4  Minimal assistance   Additional items Assist x 1; Increased time required;Verbal cues;LE management   Transfers   Sit to Stand 4  Minimal assistance   Additional items Assist x 1; Increased time required;Verbal cues   Stand to Sit 4  Minimal assistance   Additional items Assist x 1; Increased time required;Verbal cues   Ambulation/Elevation   Gait pattern Decreased toe off;Decreased heel strike;Decreased hip extension; Excessively slow; Short stride; Shuffling   Gait Assistance 4  Minimal assist   Additional items Assist x 1;Verbal cues   Assistive Device Rolling walker   Distance 10 feet   Balance   Static Sitting Fair +   Dynamic Sitting Fair   Static Standing Fair -   Dynamic Standing Poor +   Ambulatory Poor +   Endurance Deficit   Endurance Deficit Yes   Endurance Deficit Description decreased activity tolerance; pt requiring supplemental oxygen; pt was received on 2L O2 via NC; pt with dyspnea on exertion   Activity Tolerance   Activity Tolerance Patient limited by fatigue   Medical Staff Made Aware OT Damari Light  (Co-evaluation performed with OT secondary to complex medical condition of patient and regression of functional status from baseline. PT/OT goals were addressed separately.)   Assessment   Prognosis Good   Problem List Decreased strength;Decreased endurance; Impaired balance;Decreased mobility; Impaired sensation   Assessment Pt is 79year old female seen for PT evaluation s[facility] with Acute on chronic respiratory failure with hypoxia ([address]). PT consulted to assess pt's functional mobility and discharge needs. Order placed for PT evaluation and treatment, with up and out of bed as tolerated order. Comorbidities affecting pt's physical performance at time of assessment include CAD s/p percutaneous coronary angioplasty, idiopathic pulmonary fibrosis, hypothyroid, hypertension, type 1 DM, pneumonia, and anemia. Prior to hospitalization, pt was independent with all functional mobility with a rollator. Pt ambulates household and community distances. Pt resides with her spouse, in a two level house, but is able to stay on the first floor, with 4 steps to enter. Personal factors affecting pt at time of initial evaluation include lives in a two story house, stairs to enter home, ambulating with an assistive device, inability to ambulate household distances, inability to ambulate community distances, inability to navigate level surfaces without external assistance, unable to perform dynamic tasks in the community, difficulty performing ADLs, and inability to perform IADLs. Please find objective findings from PT assessment regarding body systems outlined above with impairments and limitations including weakness, impaired balance, decreased endurance, gait deviations, decreased activity tolerance, decreased functional mobility tolerance, altered sensation, and fall risk.  The following objective measures were performed on initial evaluation Barthel Index: 45/100, Modified Lucrecia: 4 (moderate/severe disability) and AM-PAC 6-Clicks: 31/61. Pt's clinical presentation is currently unstable/unpredictable seen in pt's presentation of need for ongoing medical management/monitoring, pt is a fall risk, pt is currently requiring supplemental oxygen, pt has dsypnea on exertion, and pt requires cues and assist for safety with functional mobility. Pt to benefit from continued PT treatment to address deficits as defined above and maximize pt's level of function and independence with mobility. From a PT standpoint, recommendation at time of discharge would be STR pending pt's progress in order to facilitate return to prior level of function. Barriers to Discharge Inaccessible home environment   Goals   STG Expiration Date 10/01/23   Short Term Goal #1 In 10 days: Increase bilateral LE strength 1/2 grade to facilitate independent mobility, Perform all bed mobility tasks modified independent to decrease caregiver burden, Perform all transfers modified independent to improve independence, Ambulate > 150 ft. with RW modified independent w/o LOB and w/ normalized gait pattern 100% of the time, Navigate 4 stairs modified independent with unilateral handrail to facilitate return to previous living environment and Increase all balance 1/2 grade to decrease risk for falls   Plan   Treatment/Interventions Functional transfer training;LE strengthening/ROM; Elevations; Therapeutic exercise; Endurance training;Patient/family training;Bed mobility;Gait training;OT   PT Frequency 3-5x/wk   Recommendation   PT Discharge Recommendation Post acute rehabilitation services   AM-PAC Basic Mobility Inpatient   Turning in Flat Bed Without Bedrails 3   Lying on Back to Sitting on Edge of Flat Bed Without Bedrails 3   Moving Bed to Chair 3   Standing Up From Chair Using Arms 3   Walk in Room 3   Climb 3-5 Stairs With Railing 1   Basic Mobility Inpatient Raw Score 16   Basic Mobility Standardized Score 38.32   Highest Level Of Mobility   -[facility] Goal 5: Stand one or more mins   -[facility] Achieved 6: Walk 10 steps or more   Modified [place] Scale   Modified [place]cia Scale 4   Barthel Index   Feeding 10   Bathing 0   Grooming Score 0   Dressing Score 5   Bladder Score 5   Bowels Score 10   Toilet Use Score 5   Transfers (Bed/Chair) Score 10   Mobility (Level Surface) Score 0   Stairs Score 0   Barthel Index Score 45     PT Evaluation Time: [id number]    Perla Alu, PT, DPT

## 2023-09-21 NOTE — ASSESSMENT & PLAN NOTE
Hemoglobin at 7. Does have history of chronic iron deficiency anemia. Had extensive work-up with gastroenterology done in [place] including capsule endoscopy which was unremarkable and found to have AVM. Had bone marrow biopsy in [place] which was normal as per patient. Does have hematologist in [place]. Not actively bleeding currently. Had brown bowel movement today. Continue following up with hematology and gastroenterology in [place]. Trend hemoglobin  Goal of transfusion less than 7  Obtain type and screen and morning  Blood transfusion consent obtained and in chart.

## 2023-09-21 NOTE — QUICK NOTE
65-year-old female with past medical history of interstitial lung disease on 2 L nasal cannula chronically, history of scleroderma, diabetes, pulm hypertension, Raynaud's, CAD, hypothyroidism, chronic iron deficiency anemia requiring IV iron infusion as well as PRBC transfusions, presented to [facility] emergency room due to complaining of shortness of breath, diaphoresis after eating dinner. Appears comfortable nondistressed. Currently O2 saturation 95% on room air. Patient currently reports feeling much better and she is in good spirit. Denies any new complaint at this time. No other events reported. CBC noted with hemoglobin of 7.0.:  Repeat hemoglobin pending and might require transfusion. HS troponins negative x3. COVID 19/RSV/flu negative. CT chest report reviewed. Morbid obese female patient, clean nontoxic-appearing. She is awake, alert, oriented x3. Normal S1-S2, no JVD. Lung sounds noted with minimal rhonchi, not in respite distress. Saturation 96% on room air. Bilateral lower extremity noted with trace edema. She has been hospitalized secondary to dyspnea, cough, fever, unclear etiology. Currently she is hemodynamically stable. Pulmonology recommendation appreciated to check full respiratory viral panel, repeat procalcitonin trend, continue antibiotics for presumed pneumonia, continue home dose of CellCept, Lasix. Patient follows outpatient in [facility] for chronic lung disease. Pulmonary recommendation appreciated. Continue with surgical coverage for glycemic control.

## 2023-09-21 NOTE — ASSESSMENT & PLAN NOTE
Patient presented with fever, shortness of breath, chest discomfort since 1 day prior to presentation. Fever noted to be 101 on presentation. Noted to have tachypnea. Imaging showing increased interstitial marking suspicion for superimposed infection. See plan as in acute hypoxic respiratory failure.

## 2023-09-21 NOTE — PLAN OF CARE
Problem: PHYSICAL THERAPY ADULT  Goal: Performs mobility at highest level of function for planned discharge setting. See evaluation for individualized goals. Description: Treatment/Interventions: Functional transfer training, LE strengthening/ROM, Elevations, Therapeutic exercise, Endurance training, Patient/family training, Bed mobility, Gait training, OT          See flowsheet documentation for full assessment, interventions and recommendations. Note: Prognosis: Good  Problem List: Decreased strength, Decreased endurance, Impaired balance, Decreased mobility, Impaired sensation  Assessment: Pt is 79year old female seen for PT evaluation s/p admit to [facility] on 9/20/2023 with Acute on chronic respiratory failure with hypoxia ([address]). PT consulted to assess pt's functional mobility and discharge needs. Order placed for PT evaluation and treatment, with up and out of bed as tolerated order. Comorbidities affecting pt's physical performance at time of assessment include CAD s/p percutaneous coronary angioplasty, idiopathic pulmonary fibrosis, hypothyroid, hypertension, type 1 DM, pneumonia, and anemia. Prior to hospitalization, pt was independent with all functional mobility with a rollator. Pt ambulates household and community distances. Pt resides with her spouse, in a two level house, but is able to stay on the first floor, with 4 steps to enter. Personal factors affecting pt at time of initial evaluation include lives in a two story house, stairs to enter home, ambulating with an assistive device, inability to ambulate household distances, inability to ambulate community distances, inability to navigate level surfaces without external assistance, unable to perform dynamic tasks in the community, difficulty performing ADLs, and inability to perform IADLs.  Please find objective findings from PT assessment regarding body systems outlined above with impairments and limitations including weakness, impaired balance, decreased endurance, gait deviations, decreased activity tolerance, decreased functional mobility tolerance, altered sensation, and fall risk. The following objective measures were performed on initial evaluation Barthel Index: 45/100, Modified [place]: 4 (moderate/severe disability) and AM-PAC 6-Clicks: 90/99. Pt's clinical presentation is currently unstable/unpredictable seen in pt's presentation of need for ongoing medical management/monitoring, pt is a fall risk, pt is currently requiring supplemental oxygen, pt has dsypnea on exertion, and pt requires cues and assist for safety with functional mobility. Pt to benefit from continued PT treatment to address deficits as defined above and maximize pt's level of function and independence with mobility. From a PT standpoint, recommendation at time of discharge would be STR pending pt's progress in order to facilitate return to prior level of function. Barriers to Discharge: Inaccessible home environment     PT Discharge Recommendation: Post acute rehabilitation services    See flowsheet documentation for full assessment.

## 2023-09-21 NOTE — OCCUPATIONAL THERAPY NOTE
Occupational Therapy Evaluation      Horacio Casanovak    9/21/2023    Patient Active Problem List   Diagnosis    CAD S/P percutaneous coronary angioplasty    IPF (idiopathic pulmonary fibrosis) (HCC)    Hypothyroid    Hypertension    Type 1 diabetes mellitus without complication ([address])    Pneumonia    Acute on chronic respiratory failure with hypoxia ([address])    Anemia       Past Medical History:   Diagnosis Date    Diabetes mellitus ([address])     type 1    Heart attack ([address])     Peripheral arterial disease ([address])     Pneumonia        Past Surgical History:   Procedure Laterality Date    FOOT AMPUTATION          09/21/23 0831   OT Last Visit   OT Visit Date 09/21/23   Note Type   Note type Evaluation   Additional Comments Pt agreeable to OT eval. Upon arrival pt supine in bed with HOB elevated. Pain Assessment   Pain Assessment Tool 0-10   Pain Score No Pain   Restrictions/Precautions   Weight Bearing Precautions Per Order No   Braces or Orthoses Other (Comment)  (shoe insert for R LE)   Other Precautions Multiple lines;Telemetry; Fall Risk;O2  (2 L O2 via NC)   Home Living   Type of [address] [place] [address] Two level;Performs ADLs on one level; Able to live on main level with bedroom/bathroom;Stairs to enter with rails  (4 RAJINDER)   Bathroom Shower/Tub Walk-in shower   Bathroom Toilet Raised   Bathroom Equipment Shower chair;Grab bars in shower;Grab bars around toilet   [address] aid;Reacher  (utilizes rollator at baseline)   Additional Comments Pt utilized 2 L O2 at home PRN   Prior Function   Level of [place] Independent with functional mobility; Independent with ADLs; Needs assistance with IADLS   Lives With Significant other  ("Tam")   Receives Help From Family;Friend(s)   IADLs Family/Friend/Other provides transportation; Family/Friend/Other provides meals; Independent with medication management   Falls in the last 6 months 0   Vocational Retired   Comments Pt has house in [place] and [place]; recently returned to [address] Patient reporting being independent with ADLs, ambulatory with rollator, and lives with significant other   Reciprocal Relationships Peter   Service to Others Retired   Intrinsic Gratification Talking c friends   ADL   Eating Assistance 5  Supervision/Setup   Grooming Assistance 5  Supervision/Setup   UB Bathing Assistance 5  Supervision/Setup    [address] 3  Moderate Assistance   UB [address] [place] Toy Final 4  Minimal Assistance   LB [address] [place] Toy Final 3  Moderate [address]  3  Moderate Assistance   Additional Comments ADL levels based on functional performance during OT eval.   Bed Mobility   Supine to Sit 4  Minimal assistance   Additional items Assist x 1;HOB elevated; Increased time required;Verbal cues;LE management   Sit to Supine 4  Minimal assistance   Additional items Assist x 1; Increased time required;Verbal cues;LE management   Additional Comments Pt denied lightheaded/dizziness with transitional movements   Transfers   Sit to Stand 4  Minimal assistance   Additional items Assist x 1; Increased time required;Verbal cues   Stand to Sit 4  Minimal assistance   Additional items Assist x 1; Increased time required;Verbal cues   Functional Mobility   Functional Mobility 4  Minimal assistance   Additional Comments Pt ambulated in room with no overt SOB. Pt unsteady but denied dizziness. Additional items Rolling walker   Balance   Static Sitting Fair +   Dynamic Sitting Fair   Static Standing Fair -   Dynamic Standing Poor +   Activity Tolerance   Activity Tolerance Patient limited by fatigue   Medical Staff Made Aware Pt seen as a co-eval with PT due to the patient's co-morbidities and clinically unstable presentation indicated by chart review.    RUE Assessment   RUE Assessment WFL  (full AROM; 4-/5 MMT)   LUE Assessment   LUE Assessment WFL  (~110 degree shoulder ROM d/t "frozen shoulder" deferred MMT) Hand Function   Gross Motor Coordination Functional   Fine Motor Coordination Functional   Sensation   Light Touch No apparent deficits   Vision-Basic Assessment   Current Vision Wears glasses only for reading   Visual History Corrective eye surgery  (hx of cataracts)   Cognition   Overall Cognitive Status WFL   Arousal/Participation Alert; Responsive; Cooperative   Attention Within functional limits   Orientation Level Oriented X4   Memory Within functional limits   Following Commands Follows all commands and directions without difficulty   Assessment   Limitation Decreased ADL status; Decreased UE strength;Decreased endurance;Decreased self-care trans;Decreased high-level ADLs; Decreased UE ROM   Prognosis Good   Assessment Patient is a 79 y.o. female seen for OT evaluation s/p admit to [facility]  on 9/20/2023 w/Acute on chronic respiratory failure with hypoxia ([address] Commorbidities affecting patient's functional performance at time of assessment include: CAD, IPF, hypothyroid, HTN, DM1, and pneumonia. Orders placed for OT evaluation and treatment and up and OOB as tolerated . Performed at least two patient identifiers during session including name and wristband. Prior to admission, Patient reporting being independent with ADLs, ambulatory with rollator, and lives with significant other. Personal factors affecting patient at time of initial evaluation include: steps to enter, difficulty performing ADLs and difficulty performing IADLs. Upon evaluation, patient requires supervision and minimal  assist for UB ADLs, moderate assist for LB ADLs, transfers and functional ambulation in room and bathroom with minimal  assist, with the use of Rolling Walker. Patient is oriented x 4.   Occupational performance is affected by the following deficits: decreased functional reach, limited active ROM, decreased muscle strength, dynamic sit/ stand balance deficit with poor standing tolerance time for self care and functional mobility, decreased activity tolerance and delayed righting and equilibrium reactions. Patient to benefit from continued Occupational Therapy treatment while in the hospital to address deficits as defined above and maximize level of functional independence with ADLs and functional mobility. Occupational Performance areas to address include: grooming , bathing/ shower, dressing, toilet hygiene, transfer to all surfaces, functional ambulation, medication routine/ management, IADLS: Household maintenance and IADLs: safety procedures. From OT standpoint, recommendation at time of d/c would be Post acute rehabilitation services. Goals   Patient Goals to get better   Plan   Treatment Interventions ADL retraining;Functional transfer training;UE strengthening/ROM; Endurance training;Patient/family training; Compensatory technique education; Activityengagement   Goal Expiration Date 10/01/23   OT Treatment Day 0   OT Frequency 2-3x/wk   Recommendation   OT Discharge Recommendation Post acute rehabilitation services   AM-PAC Daily Activity Inpatient   Lower Body Dressing 2   Bathing 2   Toileting 2   Upper Body Dressing 3   Grooming 3   Eating 3   Daily Activity Raw Score 15   Daily Activity Standardized Score (Calc for Raw Score >=11) 34.69   AM-PAC Applied Cognition Inpatient   Following a Speech/Presentation 4   Understanding Ordinary Conversation 4   Taking Medications 3   Remembering Where Things Are Placed or Put Away 3   Remembering List of 4-5 Errands 3   Taking Care of Complicated Tasks 3   Applied Cognition Raw Score 20   Applied Cognition Standardized Score 41.76   Modified Mitchell Scale   Modified Mitchell Scale 4   End of Consult   Patient Position at End of Consult Supine; All needs within reach   Nurse Communication Nurse aware of consult     GOALS:    *ADL transfers with (I) for inc'd independence with ADLs/purposeful tasks    *UB ADL with (I) for inc'd independence with self cares    *LB ADL with (I) using AE prn for inc'd independence with self cares    *Toileting with (I) for clothing management and hygiene for return to PLOF with personal care    *Increase stand tolerance x 5  m for inc'd tolerance with standing purposeful tasks    *Participate in 10m UE therex to increase overall stamina/activity tolerance for purposeful tasks    *Bed mobility- (I) for inc'd independence to manage own comfort and initiate EOB & OOB purposeful tasks    *Patient will verbalize 3 safety awareness/ principles to prevent falls in the home setting. *Patient will verbalize and demonstrate use of energy conservation/deep breathing techniques and work simplification skills during functional activities with no verbal cues. *Patient will increase OOB/sitting tolerance to 2-4 hours per day to increase participation in self-care and leisure tasks with no s/s of exertion.    Staci Hunt, OTD, OTR/L

## 2023-09-22 ENCOUNTER — ANESTHESIA EVENT (INPATIENT)
Dept: GASTROENTEROLOGY | Facility: HOSPITAL | Age: 68
DRG: 377 | End: 2023-09-22
Payer: MEDICARE

## 2023-09-22 ENCOUNTER — ANESTHESIA (INPATIENT)
Dept: GASTROENTEROLOGY | Facility: HOSPITAL | Age: 68
DRG: 377 | End: 2023-09-22
Payer: MEDICARE

## 2023-09-22 ENCOUNTER — APPOINTMENT (INPATIENT)
Dept: GASTROENTEROLOGY | Facility: HOSPITAL | Age: 68
DRG: 377 | End: 2023-09-22
Attending: INTERNAL MEDICINE
Payer: MEDICARE

## 2023-09-22 PROBLEM — N18.9 CKD (CHRONIC KIDNEY DISEASE): Status: ACTIVE | Noted: 2023-09-22

## 2023-09-22 PROBLEM — D62 ACUTE ON CHRONIC BLOOD LOSS ANEMIA: Status: ACTIVE | Noted: 2023-09-21

## 2023-09-22 LAB
ABO GROUP BLD BPU: NORMAL
ANION GAP SERPL CALCULATED.3IONS-SCNC: 7 MMOL/L
B PARAP IS1001 DNA NPH QL NAA+NON-PROBE: NOT DETECTED
B PERT.PT PRMT NPH QL NAA+NON-PROBE: NOT DETECTED
BASOPHILS # BLD AUTO: 0.03 THOUSANDS/ÂΜL (ref 0–0.1)
BASOPHILS NFR BLD AUTO: 1 % (ref 0–1)
BPU ID: NORMAL
BUN SERPL-MCNC: 55 MG/DL (ref 5–25)
C PNEUM DNA NPH QL NAA+NON-PROBE: NOT DETECTED
CALCIUM SERPL-MCNC: 8.6 MG/DL (ref 8.4–10.2)
CHLORIDE SERPL-SCNC: 108 MMOL/L (ref 96–108)
CO2 SERPL-SCNC: 22 MMOL/L (ref 21–32)
CREAT SERPL-MCNC: 1.48 MG/DL (ref 0.6–1.3)
CROSSMATCH: NORMAL
EOSINOPHIL # BLD AUTO: 0.35 THOUSAND/ÂΜL (ref 0–0.61)
EOSINOPHIL NFR BLD AUTO: 8 % (ref 0–6)
ERYTHROCYTE [DISTWIDTH] IN BLOOD BY AUTOMATED COUNT: 15.4 % (ref 11.6–15.1)
FLUAV RNA NPH QL NAA+NON-PROBE: NOT DETECTED
FLUBV RNA NPH QL NAA+NON-PROBE: NOT DETECTED
GFR SERPL CREATININE-BSD FRML MDRD: 36 ML/MIN/1.73SQ M
GLUCOSE SERPL-MCNC: 116 MG/DL (ref 65–140)
GLUCOSE SERPL-MCNC: 132 MG/DL (ref 65–140)
GLUCOSE SERPL-MCNC: 177 MG/DL (ref 65–140)
GLUCOSE SERPL-MCNC: 180 MG/DL (ref 65–140)
GLUCOSE SERPL-MCNC: 268 MG/DL (ref 65–140)
HADV DNA NPH QL NAA+NON-PROBE: NOT DETECTED
HCOV 229E RNA NPH QL NAA+NON-PROBE: NOT DETECTED
HCOV HKU1 RNA NPH QL NAA+NON-PROBE: NOT DETECTED
HCOV NL63 RNA NPH QL NAA+NON-PROBE: NOT DETECTED
HCOV OC43 RNA NPH QL NAA+NON-PROBE: NOT DETECTED
HCT VFR BLD AUTO: 21 % (ref 34.8–46.1)
HCT VFR BLD AUTO: 23.9 % (ref 34.8–46.1)
HGB BLD-MCNC: 6.7 G/DL (ref 11.5–15.4)
HGB BLD-MCNC: 7.7 G/DL (ref 11.5–15.4)
HMPV RNA NPH QL NAA+NON-PROBE: NOT DETECTED
HPIV1 RNA NPH QL NAA+NON-PROBE: NOT DETECTED
HPIV2 RNA NPH QL NAA+NON-PROBE: NOT DETECTED
HPIV3 RNA NPH QL NAA+NON-PROBE: NOT DETECTED
HPIV4 RNA NPH QL NAA+NON-PROBE: NOT DETECTED
IMM GRANULOCYTES # BLD AUTO: 0 THOUSAND/UL (ref 0–0.2)
IMM GRANULOCYTES NFR BLD AUTO: 0 % (ref 0–2)
LYMPHOCYTES # BLD AUTO: 0.51 THOUSANDS/ÂΜL (ref 0.6–4.47)
LYMPHOCYTES NFR BLD AUTO: 12 % (ref 14–44)
M PNEUMO DNA NPH QL NAA+NON-PROBE: NOT DETECTED
MCH RBC QN AUTO: 29.4 PG (ref 26.8–34.3)
MCHC RBC AUTO-ENTMCNC: 31.9 G/DL (ref 31.4–37.4)
MCV RBC AUTO: 92 FL (ref 82–98)
MONOCYTES # BLD AUTO: 0.44 THOUSAND/ÂΜL (ref 0.17–1.22)
MONOCYTES NFR BLD AUTO: 10 % (ref 4–12)
NEUTROPHILS # BLD AUTO: 2.96 THOUSANDS/ÂΜL (ref 1.85–7.62)
NEUTS SEG NFR BLD AUTO: 69 % (ref 43–75)
NRBC BLD AUTO-RTO: 0 /100 WBCS
PLATELET # BLD AUTO: 310 THOUSANDS/UL (ref 149–390)
PMV BLD AUTO: 9.7 FL (ref 8.9–12.7)
POTASSIUM SERPL-SCNC: 3.9 MMOL/L (ref 3.5–5.3)
PROCALCITONIN SERPL-MCNC: 0.16 NG/ML
RBC # BLD AUTO: 2.28 MILLION/UL (ref 3.81–5.12)
RSV RNA NPH QL NAA+NON-PROBE: NOT DETECTED
RV+EV RNA NPH QL NAA+NON-PROBE: NOT DETECTED
SARS-COV-2 RNA NPH QL NAA+NON-PROBE: NOT DETECTED
SODIUM SERPL-SCNC: 137 MMOL/L (ref 135–147)
UNIT DISPENSE STATUS: NORMAL
UNIT PRODUCT CODE: NORMAL
UNIT PRODUCT VOLUME: 350 ML
UNIT RH: NORMAL
WBC # BLD AUTO: 4.29 THOUSAND/UL (ref 4.31–10.16)

## 2023-09-22 PROCEDURE — 85025 COMPLETE CBC W/AUTO DIFF WBC: CPT | Performed by: INTERNAL MEDICINE

## 2023-09-22 PROCEDURE — 0W3P8ZZ CONTROL BLEEDING IN GASTROINTESTINAL TRACT, VIA NATURAL OR ARTIFICIAL OPENING ENDOSCOPIC: ICD-10-PCS | Performed by: INTERNAL MEDICINE

## 2023-09-22 PROCEDURE — 99232 SBSQ HOSP IP/OBS MODERATE 35: CPT | Performed by: INTERNAL MEDICINE

## 2023-09-22 PROCEDURE — 84145 PROCALCITONIN (PCT): CPT | Performed by: INTERNAL MEDICINE

## 2023-09-22 PROCEDURE — 94760 N-INVAS EAR/PLS OXIMETRY 1: CPT

## 2023-09-22 PROCEDURE — P9016 RBC LEUKOCYTES REDUCED: HCPCS

## 2023-09-22 PROCEDURE — 82948 REAGENT STRIP/BLOOD GLUCOSE: CPT

## 2023-09-22 PROCEDURE — 30243N1 TRANSFUSION OF NONAUTOLOGOUS RED BLOOD CELLS INTO CENTRAL VEIN, PERCUTANEOUS APPROACH: ICD-10-PCS | Performed by: INTERNAL MEDICINE

## 2023-09-22 PROCEDURE — 85014 HEMATOCRIT: CPT | Performed by: STUDENT IN AN ORGANIZED HEALTH CARE EDUCATION/TRAINING PROGRAM

## 2023-09-22 PROCEDURE — 80048 BASIC METABOLIC PNL TOTAL CA: CPT | Performed by: INTERNAL MEDICINE

## 2023-09-22 PROCEDURE — C9113 INJ PANTOPRAZOLE SODIUM, VIA: HCPCS | Performed by: STUDENT IN AN ORGANIZED HEALTH CARE EDUCATION/TRAINING PROGRAM

## 2023-09-22 PROCEDURE — 99233 SBSQ HOSP IP/OBS HIGH 50: CPT | Performed by: STUDENT IN AN ORGANIZED HEALTH CARE EDUCATION/TRAINING PROGRAM

## 2023-09-22 PROCEDURE — 85018 HEMOGLOBIN: CPT | Performed by: STUDENT IN AN ORGANIZED HEALTH CARE EDUCATION/TRAINING PROGRAM

## 2023-09-22 RX ORDER — OCTREOTIDE ACETATE 100 UG/ML
100 INJECTION, SOLUTION INTRAVENOUS; SUBCUTANEOUS EVERY 8 HOURS SCHEDULED
Status: DISCONTINUED | OUTPATIENT
Start: 2023-09-22 | End: 2023-09-24

## 2023-09-22 RX ORDER — PROPOFOL 10 MG/ML
INJECTION, EMULSION INTRAVENOUS AS NEEDED
Status: DISCONTINUED | OUTPATIENT
Start: 2023-09-22 | End: 2023-09-22

## 2023-09-22 RX ORDER — SODIUM CHLORIDE, SODIUM LACTATE, POTASSIUM CHLORIDE, CALCIUM CHLORIDE 600; 310; 30; 20 MG/100ML; MG/100ML; MG/100ML; MG/100ML
INJECTION, SOLUTION INTRAVENOUS CONTINUOUS PRN
Status: DISCONTINUED | OUTPATIENT
Start: 2023-09-22 | End: 2023-09-22

## 2023-09-22 RX ORDER — LIDOCAINE HYDROCHLORIDE 20 MG/ML
INJECTION, SOLUTION EPIDURAL; INFILTRATION; INTRACAUDAL; PERINEURAL AS NEEDED
Status: DISCONTINUED | OUTPATIENT
Start: 2023-09-22 | End: 2023-09-22

## 2023-09-22 RX ORDER — SODIUM CHLORIDE 9 MG/ML
75 INJECTION, SOLUTION INTRAVENOUS CONTINUOUS
Status: DISCONTINUED | OUTPATIENT
Start: 2023-09-22 | End: 2023-09-23

## 2023-09-22 RX ADMIN — PROPOFOL 35 MG: 10 INJECTION, EMULSION INTRAVENOUS at 13:19

## 2023-09-22 RX ADMIN — PROPOFOL 45 MG: 10 INJECTION, EMULSION INTRAVENOUS at 13:44

## 2023-09-22 RX ADMIN — PROPOFOL 45 MG: 10 INJECTION, EMULSION INTRAVENOUS at 13:35

## 2023-09-22 RX ADMIN — SODIUM CHLORIDE, PRESERVATIVE FREE 3 ML: 5 INJECTION INTRAVENOUS at 08:26

## 2023-09-22 RX ADMIN — INSULIN GLARGINE 20 UNITS: 100 INJECTION, SOLUTION SUBCUTANEOUS at 21:22

## 2023-09-22 RX ADMIN — ONDANSETRON 4 MG: 2 INJECTION INTRAMUSCULAR; INTRAVENOUS at 09:41

## 2023-09-22 RX ADMIN — PROPOFOL 35 MG: 10 INJECTION, EMULSION INTRAVENOUS at 13:50

## 2023-09-22 RX ADMIN — CEFTRIAXONE 2000 MG: 2 INJECTION, SOLUTION INTRAVENOUS at 07:53

## 2023-09-22 RX ADMIN — MELATONIN TAB 3 MG 3 MG: 3 TAB at 21:23

## 2023-09-22 RX ADMIN — PROPOFOL 45 MG: 10 INJECTION, EMULSION INTRAVENOUS at 13:47

## 2023-09-22 RX ADMIN — PANTOPRAZOLE SODIUM 40 MG: 40 INJECTION, POWDER, FOR SOLUTION INTRAVENOUS at 21:23

## 2023-09-22 RX ADMIN — ASPIRIN 81 MG: 81 TABLET, COATED ORAL at 09:04

## 2023-09-22 RX ADMIN — SODIUM CHLORIDE 75 ML/HR: 0.9 INJECTION, SOLUTION INTRAVENOUS at 15:35

## 2023-09-22 RX ADMIN — SODIUM CHLORIDE, SODIUM LACTATE, POTASSIUM CHLORIDE, AND CALCIUM CHLORIDE: .6; .31; .03; .02 INJECTION, SOLUTION INTRAVENOUS at 12:47

## 2023-09-22 RX ADMIN — MYCOPHENOLATE MOFETIL 500 MG: 250 CAPSULE ORAL at 21:23

## 2023-09-22 RX ADMIN — LIDOCAINE HYDROCHLORIDE 100 MG: 20 INJECTION, SOLUTION EPIDURAL; INFILTRATION; INTRACAUDAL at 13:15

## 2023-09-22 RX ADMIN — PROPOFOL 35 MG: 10 INJECTION, EMULSION INTRAVENOUS at 13:32

## 2023-09-22 RX ADMIN — ONDANSETRON 4 MG: 2 INJECTION INTRAMUSCULAR; INTRAVENOUS at 15:56

## 2023-09-22 RX ADMIN — PROPOFOL 35 MG: 10 INJECTION, EMULSION INTRAVENOUS at 13:26

## 2023-09-22 RX ADMIN — PANTOPRAZOLE SODIUM 40 MG: 40 INJECTION, POWDER, FOR SOLUTION INTRAVENOUS at 08:24

## 2023-09-22 RX ADMIN — AZITHROMYCIN 500 MG: 500 TABLET, FILM COATED ORAL at 07:52

## 2023-09-22 RX ADMIN — LEVOTHYROXINE SODIUM 175 MCG: 25 TABLET ORAL at 05:31

## 2023-09-22 RX ADMIN — PROPOFOL 35 MG: 10 INJECTION, EMULSION INTRAVENOUS at 13:41

## 2023-09-22 RX ADMIN — PROPOFOL 45 MG: 10 INJECTION, EMULSION INTRAVENOUS at 13:29

## 2023-09-22 RX ADMIN — SODIUM CHLORIDE, PRESERVATIVE FREE 3 ML: 5 INJECTION INTRAVENOUS at 21:25

## 2023-09-22 RX ADMIN — OCTREOTIDE ACETATE 100 MCG: 100 INJECTION, SOLUTION INTRAVENOUS; SUBCUTANEOUS at 21:46

## 2023-09-22 RX ADMIN — PROPOFOL 45 MG: 10 INJECTION, EMULSION INTRAVENOUS at 13:38

## 2023-09-22 RX ADMIN — PROPOFOL 65 MG: 10 INJECTION, EMULSION INTRAVENOUS at 13:17

## 2023-09-22 RX ADMIN — MYCOPHENOLATE MOFETIL 500 MG: 250 CAPSULE ORAL at 08:24

## 2023-09-22 RX ADMIN — METOPROLOL SUCCINATE 25 MG: 25 TABLET, EXTENDED RELEASE ORAL at 08:24

## 2023-09-22 RX ADMIN — PROPOFOL 35 MG: 10 INJECTION, EMULSION INTRAVENOUS at 13:21

## 2023-09-22 RX ADMIN — INSULIN LISPRO 3 UNITS: 100 INJECTION, SOLUTION INTRAVENOUS; SUBCUTANEOUS at 17:32

## 2023-09-22 RX ADMIN — ATORVASTATIN CALCIUM 80 MG: 40 TABLET, FILM COATED ORAL at 16:05

## 2023-09-22 RX ADMIN — LISINOPRIL 20 MG: 20 TABLET ORAL at 08:24

## 2023-09-22 RX ADMIN — PROPOFOL 65 MG: 10 INJECTION, EMULSION INTRAVENOUS at 13:16

## 2023-09-22 RX ADMIN — OCTREOTIDE ACETATE 100 MCG: 100 INJECTION, SOLUTION INTRAVENOUS; SUBCUTANEOUS at 15:39

## 2023-09-22 RX ADMIN — PROPOFOL 45 MG: 10 INJECTION, EMULSION INTRAVENOUS at 13:24

## 2023-09-22 NOTE — PLAN OF CARE
Problem: Potential for Falls  Goal: Patient will remain free of falls  Description: INTERVENTIONS:  - Educate patient/family on patient safety including physical limitations  - Instruct patient to call for assistance with activity   - Consult OT/PT to assist with strengthening/mobility   - Keep Call bell within reach  - Keep bed low and locked with side rails adjusted as appropriate  - Keep care items and personal belongings within reach  - Initiate and maintain comfort rounds  - Make Fall Risk Sign visible to staff  - Offer Toileting every 2 Hours, in advance of need  - Initiate/Maintain bed alarm  - Obtain necessary fall risk management equipment: yes   - Apply yellow socks and bracelet for high fall risk patients  - Consider moving patient to room near nurses station  Outcome: Progressing     Problem: Prexisting or High Potential for Compromised Skin Integrity  Goal: Skin integrity is maintained or improved  Description: INTERVENTIONS:  - Identify patients at risk for skin breakdown  - Assess and monitor skin integrity  - Assess and monitor nutrition and hydration status  - Monitor labs   - Assess for incontinence   - Turn and reposition patient  - Assist with mobility/ambulation  - Relieve pressure over bony prominences  - Avoid friction and shearing  - Provide appropriate hygiene as needed including keeping skin clean and dry  - Evaluate need for skin moisturizer/barrier cream  - Collaborate with interdisciplinary team   - Patient/family teaching  - Consider wound care consult   Outcome: Progressing     Problem: PAIN - ADULT  Goal: Verbalizes/displays adequate comfort level or baseline comfort level  Description: Interventions:  - Encourage patient to monitor pain and request assistance  - Assess pain using appropriate pain scale  - Administer analgesics based on type and severity of pain and evaluate response  - Implement non-pharmacological measures as appropriate and evaluate response  - Consider cultural and social influences on pain and pain management  - Notify physician/advanced practitioner if interventions unsuccessful or patient reports new pain  Outcome: Progressing     Problem: INFECTION - ADULT  Goal: Absence or prevention of progression during hospitalization  Description: INTERVENTIONS:  - Assess and monitor for signs and symptoms of infection  - Monitor lab/diagnostic results  - Monitor all insertion sites, i.e. indwelling lines, tubes, and drains  - Monitor endotracheal if appropriate and nasal secretions for changes in amount and color  - [place] appropriate cooling/warming therapies per order  - Administer medications as ordered  - Instruct and encourage patient and family to use good hand hygiene technique  - Identify and instruct in appropriate isolation precautions for identified infection/condition  Outcome: Progressing  Goal: Absence of fever/infection during neutropenic period  Description: INTERVENTIONS:  - Monitor WBC    Outcome: Progressing     Problem: SAFETY ADULT  Goal: Patient will remain free of falls  Description: INTERVENTIONS:  - Educate patient/family on patient safety including physical limitations  - Instruct patient to call for assistance with activity   - Consult OT/PT to assist with strengthening/mobility   - Keep Call bell within reach  - Keep bed low and locked with side rails adjusted as appropriate  - Keep care items and personal belongings within reach  - Initiate and maintain comfort rounds  - Make Fall Risk Sign visible to staff  - Offer Toileting every 2 Hours, in advance of need  - Initiate/Maintain bed alarm  - Obtain necessary fall risk management equipment: yes   - Apply yellow socks and bracelet for high fall risk patients  - Consider moving patient to room near nurses station  Outcome: Progressing     Problem: DISCHARGE PLANNING  Goal: Discharge to home or other facility with appropriate resources  Description: INTERVENTIONS:  - Identify barriers to discharge w/patient and caregiver  - Arrange for needed discharge resources and transportation as appropriate  - Identify discharge learning needs (meds, wound care, etc.)  - Arrange for interpretive services to assist at discharge as needed  - Refer to Case Management Department for coordinating discharge planning if the patient needs post-hospital services based on physician/advanced practitioner order or complex needs related to functional status, cognitive ability, or social support system  Outcome: Progressing     Problem: Knowledge Deficit  Goal: Patient/family/caregiver demonstrates understanding of disease process, treatment plan, medications, and discharge instructions  Description: Complete learning assessment and assess knowledge base.   Interventions:  - Provide teaching at level of understanding  - Provide teaching via preferred learning methods  Outcome: Progressing

## 2023-09-22 NOTE — CASE MANAGEMENT
Case Management Progress Note    Patient name Ap Mendoza  Location /-45 MRN [id number]  : 1955 Date 2023       LOS (days): 1  Geometric Mean LOS (GMLOS) (days): 4.00  Days to GMLOS:2.5        OBJECTIVE:        Current admission status: Inpatient  Preferred Pharmacy:   [address] [address]  Phone: [phone] Fax: [phone]    Primary Care Provider: Justin Samano MD    Primary Insurance: MEDICARE  Secondary Insurance: [facility]    PROGRESS NOTE:    CM attempted to meet with patient at bedside to complete general assessment and discharge planning. Patient not present in her room at this time. Per Epic, patient is [place] in [place] [facility]. CM to follow up later.

## 2023-09-22 NOTE — PROGRESS NOTES
[address]  Progress Note  Name: Nicolas Celeste  MRN: [id number]  Unit/Bed#: -01 I Date of Admission: 9/20/2023   Date of Service: 9/22/2023 I Hospital Day: 1    Assessment/Plan   * Acute on chronic respiratory failure with hypoxia [facility])  Assessment & Plan  68-year-old female past medical history of interstitial lung disease, scleroderma, chronic home O2 dependent 2 L at baseline, recently started on CellCept 3 weeks ago presented with complaining of overall not feeling well, shortness of breath, fever. Patient was empirically started on ceftriaxone however procalcitonin negative x2. COVID-19/RSV/flu negative. Respiratory panel negative. Presented with mild leukocytosis, currently not leukopenic. Hemoglobin 7.0 on admission downtrending to 5.4  Currently 6.7 after chronic PRBC transfusion. Currently negative 2300cc balance. Now resolved. O2 sat well on RA. Currently she is afebrile, hemodynamically stable. Acute nontoxic-appearing. O2 saturation 95 to 98% on room air. Likely multifactorial in settings of chronic interstitial lung disease as well as acute on chronic anemia causing her symptoms. Discontinue antibiotics and monitor off of IV antibiotics. Pulmonology recommendation appreciated. Acute on chronic blood loss anemia  Assessment & Plan  Patient does report having history of AVM, iron deficiency anemia requiring iron infusion as well as PRBC infusion. Patient reports having history of extensive work-up including upper endoscopy, colonoscopy, capsule endoscopy 2 years ago noted with AVM in [place]. Reports she has been having dark stool. Resented with hemoglobin of 7 and subsequent downtrended to 5.4. Current hemoglobin 6.7 after 1 unit PRBC transfusion yesterday. No signs of active overt bleeding noted. Currently she is hemodynamically stable. will be getting ongoing intubation after this morning.   Continue with IV Protonix 40 milligram twice daily. Currently NPO. GI recommendation noted for EGD with push enteroscopy today. CKD (chronic kidney disease)  Assessment & Plan  Lab Results   Component Value Date    EGFR 36 09/22/2023    EGFR 43 09/20/2023    CREATININE 1.48 (H) 09/22/2023    CREATININE 1.29 09/20/2023     Appears to have CKD with baseline creatinine around 1.2 range. Patient did receive p.o. Lasix as well as IV dose of Lasix yesterday after transfusion to prevent volume overload however now noted with elevated BUN/creatinine. Did get CT chest with contrast on 09/22/2023  Currently creatinine 1.48.  will hold further diuretics for now  Will start on gently iv hydration. Monitor BMP tomorrow. Pneumonia  Assessment & Plan  Patient presented with fever, shortness of breath, chest discomfort since 1 day prior to presentation. Fever noted to be 101 on presentation. Noted to have tachypnea. Imaging showing increased interstitial marking suspicion for superimposed infection. Pneumonia ruled out. Procalcitonin negative x2. Monitor off antibiotics. Type 1 diabetes mellitus without complication (HCC)  Assessment & Plan  A1c 6.6 on 09/18/2023 per care everywhere. Controlled. Taking basal insulin 26 units daily along with lispro average of 8 units with meals. Continue Lantus at 20 units at bedtime along with lispro 5 units with meals can titrate as needed  Sliding scale coverage  Hypoglycemic protocol  Carbohydrate controlled diet    Hypertension  Assessment & Plan  Continue lisinopril, metoprolol. Hypothyroid  Assessment & Plan  Continue levothyroxine. Sees endocrinology in [place]. IPF (idiopathic pulmonary fibrosis) ([facility])  Assessment & Plan  History of IPF with pulmonary artery hypertension. Currently on CellCept. Does continue to follow-up with following up with pulmonary in [place]. Pulmonology recommendation appreciated.     CAD S/P percutaneous coronary angioplasty  Assessment & Plan  On aspirin, Plavix, statin, metoprolol, lisinopril. HOLD aspirin and plavix for now. VTE Pharmacologic Prophylaxis:   Moderate Risk (Score 3-4) - Pharmacological DVT Prophylaxis Contraindicated. Sequential Compression Devices Ordered. Patient Centered Rounds: I performed bedside rounds with nursing staff today. Discussions with Specialists or Other Care Team Provider: GI      Current Length of Stay: 1 day(s)  Current Patient Status: Inpatient   Certification Statement: The patient will continue to require additional inpatient hospital stay due to Acute on chronic blood loss anemia. Plan for EGD with push enteroscopy today. Discharge Plan: Anticipate discharge in 24-48 hrs to home. Code Status: Level 1 - Full Code    Subjective:   Seen during AM rounds. Hemoglobin improved to 6.7 after 1 unit PRBC transfusion. Patient does report feeling significantly better overall. Denies chest pain, nausea, vomiting, abdominal pain, dysuria, diarrhea, any other new complaints. No other events reported. Objective:     Vitals:   Temp (24hrs), Av.2 °F (36.8 °C), Min:97.6 °F (36.4 °C), Max:98.6 °F (37 °C)    Temp:  [97.6 °F (36.4 °C)-98.6 °F (37 °C)] 97.6 °F (36.4 °C)  HR:  [] 94  Resp:  [16-20] 20  BP: (127-158)/(45-63) 136/54  SpO2:  [90 %-99 %] 96 %  Body mass index is 35.54 kg/m². Input and Output Summary (last 24 hours): Intake/Output Summary (Last 24 hours) at 2023 1052  Last data filed at 2023 0609  Gross per 24 hour   Intake 350 ml   Output 1700 ml   Net -1350 ml       Physical Exam:   Physical Exam  Constitutional:       General: She is not in acute distress. Appearance: Normal appearance. She is not ill-appearing, toxic-appearing or diaphoretic. HENT:      Head: Normocephalic and atraumatic. Eyes:      Pupils: Pupils are equal, round, and reactive to light. Cardiovascular:      Rate and Rhythm: Normal rate. Pulses: Normal pulses.    Pulmonary:      Effort: Pulmonary effort is normal. No respiratory distress. Breath sounds: Normal breath sounds. No wheezing. Abdominal:      General: Bowel sounds are normal. There is no distension. Tenderness: There is no abdominal tenderness. Musculoskeletal:      Right lower leg: No edema. Left lower leg: No edema. Comments: History of right TMA. Neurological:      Mental Status: She is alert and oriented to person, place, and time.    Psychiatric:         Mood and Affect: Mood normal.         Behavior: Behavior normal.          Additional Data:     Labs:  Results from last 7 days   Lab Units 09/22/23 0437   WBC Thousand/uL 4.29*   HEMOGLOBIN g/dL 6.7*   HEMATOCRIT % 21.0*   PLATELETS Thousands/uL 310   NEUTROS PCT % 69   LYMPHS PCT % 12*   MONOS PCT % 10   EOS PCT % 8*     Results from last 7 days   Lab Units 09/22/23 0437 09/20/23 2255   SODIUM mmol/L 137 135   POTASSIUM mmol/L 3.9 4.1   CHLORIDE mmol/L 108 106   CO2 mmol/L 22 19*   BUN mg/dL 55* 49*   CREATININE mg/dL 1.48* 1.29   ANION GAP mmol/L 7 10   CALCIUM mg/dL 8.6 9.2   ALBUMIN g/dL  --  4.1   TOTAL BILIRUBIN mg/dL  --  0.26   ALK PHOS U/L  --  57   ALT U/L  --  9   AST U/L  --  15   GLUCOSE RANDOM mg/dL 132 117     Results from last 7 days   Lab Units 09/20/23 2255   INR  0.96     Results from last 7 days   Lab Units 09/22/23  0705 09/21/23 2008 09/21/23  1627 09/21/23  1056 09/21/23  0652 09/20/23  2238   POC GLUCOSE mg/dl 116 268* 173* 384* 342* 141*         Results from last 7 days   Lab Units 09/22/23  0437 09/20/23  2255 09/20/23 2254   LACTIC ACID mmol/L  --   --  1.7   PROCALCITONIN ng/ml 0.16 0.09  --        Lines/Drains:  Invasive Devices     Peripheral Intravenous Line  Duration           Peripheral IV 09/21/23 Right Antecubital 1 day          Drain  Duration           External Urinary Catheter <1 day                Recent Cultures (last 7 days):   Results from last 7 days   Lab Units 09/21/23  0340 09/20/23  2311 09/20/23 2255 BLOOD CULTURE   --  No Growth at 24 hrs. No Growth at 24 hrs. LEGIONELLA URINARY ANTIGEN  Negative  --   --        Last 24 Hours Medication List:   Current Facility-Administered Medications   Medication Dose Route Frequency Provider Last Rate   • acetaminophen  650 mg Oral Q6H PRN Jose M White MD     • albuterol  2 puff Inhalation Q4H PRN Jose M White MD     • atorvastatin  80 mg Oral Daily With Adam Mares MD     • insulin glargine  20 Units Subcutaneous HS Jose M White MD     • insulin lispro  1-6 Units Subcutaneous TID AC Jose M White MD     • insulin lispro  5 Units Subcutaneous TID With Meals Jose M White MD     • levothyroxine  175 mcg Oral Early Morning Jose M White MD     • lisinopril  20 mg Oral Daily Jose M White MD     • melatonin  3 mg Oral HS Jose M White MD     • metoprolol succinate  25 mg Oral Daily Jose M White MD     • mycophenolate  500 mg Oral Q12H [facility] Jose M White MD     • ondansetron  4 mg Intravenous Q6H PRN Jose M White MD     • pantoprazole  40 mg Intravenous Q12H [facility] Ham MONTE MD     • sodium chloride (PF)  3 mL Intravenous Q12H [facility] Swapna Anderson MD          Today, Patient Was Seen By: Curtis Crowell MD    **Please Note: This note may have been constructed using a voice recognition system. ** Satisfactory

## 2023-09-22 NOTE — ANESTHESIA POSTPROCEDURE EVALUATION
Post-Op Assessment Note    CV Status:  Stable  Pain Score: 0    Pain management: adequate     Mental Status:  Alert and awake   Hydration Status:  Euvolemic   PONV Controlled:  Controlled   Airway Patency:  Patent      Post Op Vitals Reviewed: Yes      Staff: CRNA         No notable events documented.     /66 (09/22/23 1358)    Temp 97.5 °F (36.4 °C) (09/22/23 1358)    Pulse 78 (09/22/23 1358)   Resp 16 (09/22/23 1358)    SpO2 90 % (09/22/23 1358)

## 2023-09-22 NOTE — ASSESSMENT & PLAN NOTE
Lab Results   Component Value Date    EGFR 36 09/22/2023    EGFR 43 09/20/2023    CREATININE 1.48 (H) 09/22/2023    CREATININE 1.29 09/20/2023     Appears to have CKD with baseline creatinine around 1.2 range. Patient did receive p.o. Lasix as well as IV dose of Lasix yesterday after transfusion to prevent volume overload however now noted with elevated BUN/creatinine. Did get CT chest with contrast on 09/22/2023  Currently creatinine 1.48.  will hold further diuretics for now  Will start on gently iv hydration. Monitor BMP tomorrow.

## 2023-09-22 NOTE — CONSULTS
Consultation - [address] [place] [address] [facility]  Ronda Dominguez 79 y.o. female MRN: [id number]  Unit/Bed#: -01 Encounter: [id number]      Consults     Reason for Consult / Principal Problem: Anemia, SB AVMs    HPI: Ronda Dominguez is a 79y.o. year old female with a PMH of DM, CAD s/p stenting in 2019, IPF on  Cellcept, hypothyroidism, chronic iron deficiency anemia who presented to the ED with complaints of SOB and was admitted with pneumonia. GI was consulted due to a severe anemia. Hemoglobin dropped to 5.4 requiring a transfusion of PRBCs. She reports a history of multiple transfusions of PRBCs and iron in the past.  She has a history of small bowel AVMs and previously has had endoscopic evaluations in [place]. EGD 1/2022 showed Hurtado's and AVMs in the duodenum treated with APC. VCE 1/2022 showed gastritis and nonbleeding AVM in the distal duodenum/proximal jejunum. EGD with push enteroscopy 1/2023 showed Hurtado's esophagus and multiple AVMs in the duodenum treated with APC. Colonoscopy 5/2021 showed a benign hyperplastic polyp that was removed. She reports that since the summer, she has been having intermittent dark and tarry stools. REVIEW OF SYSTEMS:     CONSTITUTIONAL: +Fever. Good appetite, and no recent weight loss. HEENT: No earache or tinnitus. Denies hearing loss or visual disturbances. CARDIOVASCULAR: No chest pain or palpitations. RESPIRATORY: Denies any cough, hemoptysis; +shortness of breath/dyspnea on exertion. GASTROINTESTINAL: As noted in the History of Present Illness. GENITOURINARY: No problems with urination. Denies any hematuria or dysuria. NEUROLOGIC: No dizziness or vertigo, denies headaches. MUSCULOSKELETAL: Denies any muscle or joint pain. SKIN: Denies skin rashes or itching. ENDOCRINE: Denies excessive thirst. Denies intolerance to heat or cold. PSYCHOSOCIAL: Denies depression or anxiety. Denies any recent memory loss.      Historical Information Past Medical History:   Diagnosis Date   • Diabetes mellitus ([address])     type 1   • Heart attack ([address])    • Peripheral arterial disease ([address])    • Pneumonia      Past Surgical History:   Procedure Laterality Date   • FOOT AMPUTATION       Social History   Social History     Substance and Sexual Activity   Alcohol Use Never     Social History     Substance and Sexual Activity   Drug Use Never     Social History     Tobacco Use   Smoking Status Never   Smokeless Tobacco Never     History reviewed. No pertinent family history.     Meds/Allergies     Medications Prior to Admission   Medication   • furosemide (LASIX) 40 mg tablet   • levothyroxine 175 mcg tablet   • lisinopril (ZESTRIL) 10 mg tablet   • mycophenolate (CELLCEPT) 500 mg tablet     Current Facility-Administered Medications   Medication Dose Route Frequency   • acetaminophen (TYLENOL) tablet 650 mg  650 mg Oral Q6H PRN   • albuterol (PROVENTIL HFA,VENTOLIN HFA) inhaler 2 puff  2 puff Inhalation Q4H PRN   • aspirin (ECOTRIN LOW STRENGTH) EC tablet 81 mg  81 mg Oral Daily   • atorvastatin (LIPITOR) tablet 80 mg  80 mg Oral Daily With Dinner   • azithromycin (ZITHROMAX) tablet 500 mg  500 mg Oral Q24H   • cefTRIAXone (ROCEPHIN) IVPB (premix in dextrose) 2,000 mg 50 mL  2,000 mg Intravenous Q24H   • clopidogrel (PLAVIX) tablet 75 mg  75 mg Oral Daily   • enoxaparin (LOVENOX) subcutaneous injection 40 mg  40 mg Subcutaneous Daily   • insulin glargine (LANTUS) subcutaneous injection 20 Units 0.2 mL  20 Units Subcutaneous HS   • insulin lispro (HumaLOG) 100 units/mL subcutaneous injection 1-6 Units  1-6 Units Subcutaneous TID AC   • insulin lispro (HumaLOG) 100 units/mL subcutaneous injection 5 Units  5 Units Subcutaneous TID With Meals   • levothyroxine tablet 175 mcg  175 mcg Oral Early Morning   • lisinopril (ZESTRIL) tablet 20 mg  20 mg Oral Daily   • melatonin tablet 3 mg  3 mg Oral HS   • metoprolol succinate (TOPROL-XL) 24 hr tablet 25 mg  25 mg Oral Daily • mycophenolate (CELLCEPT) capsule 500 mg  500 mg Oral Q12H [facility]   • ondansetron (ZOFRAN) injection 4 mg  4 mg Intravenous Q6H PRN   • pantoprazole (PROTONIX) injection 40 mg  40 mg Intravenous Q12H [facility]   • sodium chloride (PF) 0.9 % injection 3 mL  3 mL Intravenous Q12H [facility]       Allergies   Allergen Reactions   • Daptomycin Anaphylaxis       Objective   Blood pressure 131/50, pulse 93, temperature 97.7 °F (36.5 °C), temperature source Oral, resp. rate 20, height 5' 6.5" (1.689 m), weight 101 kg (223 lb 8.7 oz), SpO2 92 %. Intake/Output Summary (Last 24 hours) at 9/22/2023 0940  Last data filed at 9/22/2023 6226  Gross per 24 hour   Intake 350 ml   Output 1700 ml   Net -1350 ml         PHYSICAL EXAM:      General Appearance:   Alert, cooperative, no distress, appears stated age, appears pale    HEENT:   Normocephalic, atraumatic, anicteric.     Neck:  Supple, symmetrical, trachea midline, no adenopathy;    thyroid: no enlargement/tenderness/nodules; no carotid  bruit or JVD    Lungs:   Clear to auscultation bilaterally; no rales, rhonchi or wheezing; respirations unlabored    Heart[de-identified]   S1 and S2 normal; regular rate and rhythm; no murmur, rub, or gallop.    Abdomen:   Soft, non-tender, non-distended; normal bowel sounds; no masses, no organomegaly    Genitalia:   Deferred    Rectal:   Deferred    Extremities:  No cyanosis, clubbing   Pulses:  2+ and symmetric all extremities    Skin:  Skin color, texture, turgor normal, no rashes or lesions    Lymph nodes:  No palpable cervical, axillary or inguinal lymphadenopathy        Lab Results:   Admission on 09/20/2023   Component Date Value   • WBC 09/20/2023 10.71 (H)    • RBC 09/20/2023 2.35 (L)    • Hemoglobin 09/20/2023 7.0 (L)    • Hematocrit 09/20/2023 22.3 (L)    • MCV 09/20/2023 95    • MCH 09/20/2023 29.8    • MCHC 09/20/2023 31.4    • RDW 09/20/2023 15.0    • MPV 09/20/2023 10.0    • Platelets 88/26/2692 469 (H)    • nRBC 09/20/2023 0    • Neutrophils Relative 09/20/2023 75    • Immat GRANS % 09/20/2023 0    • Lymphocytes Relative 09/20/2023 15    • Monocytes Relative 09/20/2023 7    • Eosinophils Relative 09/20/2023 2    • Basophils Relative 09/20/2023 1    • Neutrophils Absolute 09/20/2023 8.01 (H)    • Immature Grans Absolute 09/20/2023 0.04    • Lymphocytes Absolute 09/20/2023 1.58    • Monocytes Absolute 09/20/2023 0.77    • Eosinophils Absolute 09/20/2023 0.25    • Basophils Absolute 09/20/2023 0.06    • Sodium 09/20/2023 135    • Potassium 09/20/2023 4.1    • Chloride 09/20/2023 106    • CO2 09/20/2023 19 (L)    • ANION GAP 09/20/2023 10    • BUN 09/20/2023 49 (H)    • Creatinine 09/20/2023 1.29    • Glucose 09/20/2023 117    • Calcium 09/20/2023 9.2    • AST 09/20/2023 15    • ALT 09/20/2023 9    • Alkaline Phosphatase 09/20/2023 57    • Total Protein 09/20/2023 6.6    • Albumin 09/20/2023 4.1    • Total Bilirubin 09/20/2023 0.26    • eGFR 09/20/2023 43    • LACTIC ACID 09/20/2023 1.7    • Procalcitonin 09/20/2023 0.09    • Protime 09/20/2023 13.4    • INR 09/20/2023 0.96    • PTT 09/20/2023 28    • Blood Culture 09/20/2023 No Growth at 24 hrs. • Blood Culture 09/20/2023 No Growth at 24 hrs. • BNP 09/20/2023 65    • pH, Bryce 09/20/2023 7. 344    • pCO2, Bryce 09/20/2023 37.6 (L)    • pO2, Bryce 09/20/2023 32.7 (L)    • HCO3, Bryce 09/20/2023 20.0 (L)    • Base Excess, Bryce 09/20/2023 -5.2    • O2 Content, Bryce 09/20/2023 6.8    • O2 HGB, VENOUS 09/20/2023 59.3 (L)    • ABO Grouping 09/20/2023 A    • Rh Factor 09/20/2023 Negative    • Antibody Screen 09/20/2023 Negative    • Specimen Expiration Date 09/20/2023 [id number]    • Color, UA 09/21/2023 Colorless    • Clarity, UA 09/21/2023 Clear    • Specific Gravity, UA 09/21/2023 1.012    • pH, UA 09/21/2023 5.0    • Leukocytes, UA 09/21/2023 Negative    • Nitrite, UA 09/21/2023 Negative    • Protein, UA 09/21/2023 Negative    • Glucose, UA 09/21/2023 Negative    • Ketones, UA 09/21/2023 Negative    • Urobilinogen, UA 09/21/2023 <2.0    • Bilirubin, UA 09/21/2023 Negative    • Occult Blood, UA 09/21/2023 Negative    • hs TnI 0hr 09/20/2023 6    • SARS-CoV-2 09/20/2023 Negative    • INFLUENZA A PCR 09/20/2023 Negative    • INFLUENZA B PCR 09/20/2023 Negative    • RSV PCR 09/20/2023 Negative    • Ventricular Rate 09/20/2023 95    • Atrial Rate 09/20/2023 95    • OH Interval 09/20/2023 186    • QRSD Interval 09/20/2023 80    • QT Interval 09/20/2023 310    • QTC Interval 09/20/2023 389    • P Axis 09/20/2023 57    • QRS Axis 09/20/2023 29    • T Wave Axis 09/20/2023 270    • POC Glucose 09/20/2023 141 (H)    • ABO Grouping 09/20/2023 A    • Rh Factor 09/20/2023 Negative    • hs TnI 2hr 09/21/2023 5    • Delta 2hr hsTnI 09/21/2023 -1    • hs TnI 4hr 09/21/2023 6    • Delta 4hr hsTnI 09/21/2023 0    • Strep pneumoniae antigen* 09/21/2023 Negative    • Legionella Urinary Antig* 09/21/2023 Negative    • POC Glucose 09/21/2023 342 (H)    • ABO Grouping 09/21/2023 A    • Rh Factor 09/21/2023 Negative    • Antibody Screen 09/21/2023 Negative    • Specimen Expiration Date 09/21/2023 [id number]    • POC Glucose 09/21/2023 384 (H)    • Adenovirus 09/21/2023 Not Detected    • Bordetella parapertussis 09/21/2023 Not Detected    • Bordetella pertussis 09/21/2023 Not Detected    • Chlamydia pneumoniae 09/21/2023 Not Detected    • SARS-CoV-2 09/21/2023 Not Detected    • Coronavirus 229E 09/21/2023 Not Detected    • Coronavirus HKU1 09/21/2023 Not Detected    • Coronavirus NL63 09/21/2023 Not Detected    • Coronavirus OC43 09/21/2023 Not Detected    • Human Metapneumovirus 09/21/2023 Not Detected    • Rhino/Enterovirus 09/21/2023 Not Detected    • Influenza A 09/21/2023 Not Detected    • Influenza B 09/21/2023 Not Detected    • Mycoplasma pneumoniae 09/21/2023 Not Detected    • Parainfluenza 1 09/21/2023 Not Detected    • Parainfluenza 2 09/21/2023 Not Detected    • Parainfluenza 3 09/21/2023 Not Detected    • Parainfluenza 4 09/21/2023 Not Detected • Respiratory Syncytial Vi* 09/21/2023 Not Detected    • WBC 09/21/2023 5.03    • RBC 09/21/2023 1.86 (L)    • Hemoglobin 09/21/2023 5.4 (LL)    • Hematocrit 09/21/2023 17.3 (L)    • MCV 09/21/2023 93    • MCH 09/21/2023 29.0    • MCHC 09/21/2023 31.2 (L)    • RDW 09/21/2023 15.1    • Platelets 45/49/0210 358    • MPV 09/21/2023 10.3    • POC Glucose 09/21/2023 173 (H)    • Unit Product Code 09/22/2023 [id number]    • Unit Number 09/22/2023 [id number]    • Unit ABO 09/22/2023 A    • Unit [facility] 09/22/2023 NEG    • Crossmatch 09/22/2023 Compatible    • Unit Dispense Status 09/22/2023 Presumed Trans    • Unit Product Volume 09/22/2023 350    • POC Glucose 09/21/2023 268 (H)    • Procalcitonin 09/22/2023 0.16    • WBC 09/22/2023 4.29 (L)    • RBC 09/22/2023 2.28 (L)    • Hemoglobin 09/22/2023 6.7 (LL)    • Hematocrit 09/22/2023 21.0 (L)    • MCV 09/22/2023 92    • MCH 09/22/2023 29.4    • MCHC 09/22/2023 31.9    • RDW 09/22/2023 15.4 (H)    • MPV 09/22/2023 9.7    • Platelets 56/91/2685 310    • nRBC 09/22/2023 0    • Neutrophils Relative 09/22/2023 69    • Immat GRANS % 09/22/2023 0    • Lymphocytes Relative 09/22/2023 12 (L)    • Monocytes Relative 09/22/2023 10    • Eosinophils Relative 09/22/2023 8 (H)    • Basophils Relative 09/22/2023 1    • Neutrophils Absolute 09/22/2023 2.96    • Immature Grans Absolute 09/22/2023 0.00    • Lymphocytes Absolute 09/22/2023 0.51 (L)    • Monocytes Absolute 09/22/2023 0.44    • Eosinophils Absolute 09/22/2023 0.35    • Basophils Absolute 09/22/2023 0.03    • Sodium 09/22/2023 137    • Potassium 09/22/2023 3.9    • Chloride 09/22/2023 108    • CO2 09/22/2023 22    • ANION GAP 09/22/2023 7    • BUN 09/22/2023 55 (H)    • Creatinine 09/22/2023 1.48 (H)    • Glucose 09/22/2023 132    • Calcium 09/22/2023 8.6    • eGFR 09/22/2023 36    • Unit Product Code 09/22/2023 [id number]    • Unit Number 09/22/2023 [id number]    • Unit ABO 09/22/2023 A    • Unit [facility] 09/22/2023 NEG    • Crossmatch 09/22/2023 Compatible    • Unit Dispense Status 09/22/2023 Issued    • Unit Product Volume 09/22/2023 350    • POC Glucose 09/22/2023 116        Imaging Studies: I have personally reviewed pertinent imaging studies. ASSESSMENT & PLAN:    Severe iron deficiency anemia  History of small bowel AVMs  Melena    - Patient has a history of an iron deficiency anemia due to chronic blood loss from small bowel AVMs requiring multiple transfusions of PRBCs and iron. - HGB dropped to yesterday to 5.4 and she was transfused 1 unit of PRBCs with improvement to 6.7 this am.  She is receiving another unit of PRBCs today. - She reports a history of intermittent dark, tarry stools since over the summer.  - She has had prior endoscopic evaluations in [place]:  EGD 1/2022 showed Hurtado's and AVMs in the duodenum treated with APC. VCE 1/2022 showed gastritis and nonbleeding AVM in the distal duodenum/proximal jejunum. EGD with push enteroscopy 1/2023 showed Hurtado's esophagus and multiple AVMs in the duodenum treated with APC. Colonoscopy 5/2021 showed a benign hyperplastic polyp that was removed. - Monitor CBC closely. - Transfusion of PRBCs per SLIM. - PPI. - Pulmonary optimization per SLIM.  - Will plan for EGD with push enteroscopy today to evaluate for additional AVMs requiring APC. NPO for the procedure. Thank you for this consultation. The patient will be seen and evaluated by Dr. Jose Hobbs.     Chente Campos PA-C  4/34/2176,2:01 AM

## 2023-09-22 NOTE — PROGRESS NOTES
Progress Note - Pulmonary   Horacio Bhat 79 y.o. female MRN: [id number]  Unit/Bed#: -01 Encounter: [id number]    Assessment:  1. Shortness of breath  2. ILD recently started on CellCept  3. Severe iron deficiency anemia  4. Scleroderma  5. Chronic hypoxemic respiratory failure    Plan:  Shortness of breath likely multifactorial related to severe iron deficiency anemia with acute drop in her hemoglobin to 5.4, also had suspected possible viral upper respiratory illness with fever on admission  Currently she is on room air with some improvement in her breathing, she uses 2 L with exertion at baseline  Respiratory viral panel has been negative, procalcitonin also normal x2 so antibiotics were discontinued  BNP is normal  She has received 1 unit PRBC, currently receiving a second unit  GI is following patient and planning for endoscopy this afternoon  She was recently started on CellCept for her CT-ILD  Follows with the pulmonologist and rheumatologist in [place], does have appointments scheduled for October  We will see patient again on Monday if still hospitalized. Please call over weekend with questions. Subjective:   Patient resting in bed. She is feeling nauseous this morning after taking her meds on an empty stomach but it is subsiding. Her breathing feels better this morning. Objective:     Vitals: Blood pressure 136/54, pulse 94, temperature 97.6 °F (36.4 °C), temperature source Oral, resp. rate 20, height 5' 6.5" (1.689 m), weight 101 kg (223 lb 8.7 oz), SpO2 96 %. ,Body mass index is 35.54 kg/m².       Intake/Output Summary (Last 24 hours) at 9/22/2023 1105  Last data filed at 9/22/2023 8081  Gross per 24 hour   Intake 350 ml   Output 1700 ml   Net -1350 ml       Invasive Devices     Peripheral Intravenous Line  Duration           Peripheral IV 09/21/23 Right Antecubital 1 day          Drain  Duration           External Urinary Catheter <1 day                Physical Exam: /54   Pulse 94   Temp 97.6 °F (36.4 °C) (Oral)   Resp 20   Ht 5' 6.5" (1.689 m)   Wt 101 kg (223 lb 8.7 oz)   SpO2 96%   BMI 35.54 kg/m²   General appearance: alert and oriented, in no acute distress  Head: Normocephalic, without obvious abnormality, atraumatic  Eyes: negative findings: conjunctivae and sclerae normal  Lungs: bibasilar crackles  Heart: regular rate and rhythm  Abdomen: normal findings: soft, non-tender  Extremities: bilateral LE edema  Skin: warm and dry  Neurologic: Mental status: Alert, oriented, thought content appropriate     Labs: I have personally reviewed pertinent lab results. , CBC:   Lab Results   Component Value Date    WBC 4.29 (L) 09/22/2023    HGB 6.7 (LL) 09/22/2023    HCT 21.0 (L) 09/22/2023    MCV 92 09/22/2023     09/22/2023    RBC 2.28 (L) 09/22/2023    MCH 29.4 09/22/2023    MCHC 31.9 09/22/2023    RDW 15.4 (H) 09/22/2023    MPV 9.7 09/22/2023    NRBC 0 09/22/2023   , CMP:   Lab Results   Component Value Date    SODIUM 137 09/22/2023    K 3.9 09/22/2023     09/22/2023    CO2 22 09/22/2023    BUN 55 (H) 09/22/2023    CREATININE 1.48 (H) 09/22/2023    CALCIUM 8.6 09/22/2023    EGFR 36 09/22/2023     Imaging and other studies: I have personally reviewed pertinent reports.    and I have personally reviewed pertinent films in PACS

## 2023-09-22 NOTE — ANESTHESIA PREPROCEDURE EVALUATION
Procedure:  EGD WITH PUSH ENTEROSCOPY  Patient is a 81-year-old female with history of diabetes, coronary artery disease status post stenting in 2019, IPF on CellCept, chronic iron deficient anemia presented to the ED with shortness of breath was admitted with pneumonia. GI consulted due to severe anemia. Hemoglobin dropped to 5.4 requiring PRBCs. She does have a history of acute blood loss anemia requiring multiple transfusions previously and iron in the past.  She is history of small bowel AVMs seen on endoscopic evaluation in [place]. EGD on 01/2020 showed Hurtado's and AVMs in the duodenum treated with APC. Capsule endoscopy that time also showed gastritis and nonbleeding AVMs in the distal duodenum and proximal jejunum. EGD with push endoscopy 1/2020 showed Hurtado's esophagus and multiple AVMs in the duodenum. Colonoscopy in 05/2021 showed benign hyperplastic polyp. She does have intermittent dark stools.   Relevant Problems   ANESTHESIA (within normal limits)      CARDIO   (+) CAD S/P percutaneous coronary angioplasty   (+) Hypertension      ENDO   (+) Hypothyroid   (+) Type 1 diabetes mellitus without complication (HCC)      GI/HEPATIC (within normal limits)  NPO confirmed  BMI 35.5      /RENAL   (+) CKD (chronic kidney disease)      HEMATOLOGY   (+) Acute on chronic blood loss anemia      PULMONARY   (+) Acute on chronic respiratory failure with hypoxia (HCC)   (+) IPF (idiopathic pulmonary fibrosis) (HCC) (on cellcept)   (+) Pneumonia   (-) URI (upper respiratory infection)   -vomiting after meds this AM  -getting pRBCs on arrival     Allergies   Allergen Reactions   • Daptomycin Anaphylaxis     Social History     Tobacco Use   • Smoking status: Never   • Smokeless tobacco: Never   Vaping Use   • Vaping Use: Never used   Substance Use Topics   • Alcohol use: Never   • Drug use: Never     Current Outpatient Medications   Medication Instructions   • furosemide (LASIX) 40 mg, Oral, 2 times daily   • levothyroxine 175 mcg, Oral, Daily   • lisinopril (ZESTRIL) 10 mg, Oral, Daily   • mycophenolate (CELLCEPT) 500 mg, Oral, Every 12 hours scheduled     Lab Results   Component Value Date    WBC 4.29 (L) 09/22/2023    HGB 6.7 (LL) 09/22/2023    HCT 21.0 (L) 09/22/2023     09/22/2023    SODIUM 137 09/22/2023    K 3.9 09/22/2023     09/22/2023    CO2 22 09/22/2023    BUN 55 (H) 09/22/2023    CREATININE 1.48 (H) 09/22/2023    GLUC 132 09/22/2023    AST 15 09/20/2023    ALT 9 09/20/2023    ALKPHOS 57 09/20/2023    TBILI 0.26 09/20/2023    ALB 4.1 09/20/2023    PROTIME 13.4 09/20/2023    PTT 28 09/20/2023    INR 0.96 09/20/2023     Vitals:    09/22/23 1216   BP:    Pulse:    Resp:    Temp:    SpO2: 96%     Physical Exam    Airway    Mallampati score: III  TM Distance: >3 FB  Neck ROM: full     Dental   Comment: Denies loose/chipped teeth, No notable dental hx     Cardiovascular  Rate: normal, Cardiovascular exam normal    Pulmonary  Pulmonary exam normal     Other Findings        Anesthesia Plan  ASA Score- 4     Anesthesia Type- IV sedation with anesthesia with ASA Monitors. Additional Monitors:   Airway Plan:     Comment: O2 mask, natural airway, EtCO2 monitor. Risks discussed including awareness, aspiration, drug reactions and conversion to GA. Patrick Lute Plan Factors-Exercise tolerance (METS): >4 METS. Chart reviewed. EKG reviewed. Existing labs reviewed. Patient summary reviewed. Patient is not a current smoker. Induction- intravenous. Postoperative Plan-     Informed Consent- Anesthetic plan and risks discussed with patient. I personally reviewed this patient with the CRNA. Discussed and agreed on the Anesthesia Plan with the CRNA. .      Medication Administration - last 24 hours from 09/21/2023 1249 to 09/22/2023 1249       Date/Time Order Dose Route Action Action by     09/22/2023 0826 EDT sodium chloride (PF) 0.9 % injection 3 mL 3 mL Intravenous Given Navin Metcalf RN 09/21/2023 2122 EDT sodium chloride (PF) 0.9 % injection 3 mL 3 mL Intravenous Given Darrow Dubin, BRANDON     09/21/2023 1737 EDT atorvastatin (LIPITOR) tablet 80 mg 80 mg Oral Given Abbey Kovacs, BRANDON     09/22/2023 7441 EDT aspirin (ECOTRIN LOW STRENGTH) EC tablet 81 mg 81 mg Oral Given Noah Lomas, BRANDON     09/22/2023 1231 EDT clopidogrel (PLAVIX) tablet 75 mg 75 mg Oral Not Given Noah Lomas, BRANDON     09/22/2023 7780 EDT lisinopril (ZESTRIL) tablet 20 mg 20 mg Oral Given Noah Lomas, RN     09/22/2023 9767 EDT metoprolol succinate (TOPROL-XL) 24 hr tablet 25 mg 25 mg Oral Given Noah Lomas, BRANDON     09/22/2023 4308 EDT mycophenolate (CELLCEPT) capsule 500 mg 500 mg Oral Given Noah Lomas, BRANDON     09/21/2023 2115 EDT mycophenolate (CELLCEPT) capsule 500 mg 500 mg Oral Given Darrow Dubin, BRANDON     09/21/2023 2115 EDT insulin glargine (LANTUS) subcutaneous injection 20 Units 0.2 mL 20 Units Subcutaneous Given Darrow Dubin, BRANDON     09/22/2023 1152 EDT insulin lispro (HumaLOG) 100 units/mL subcutaneous injection 5 Units 5 Units Subcutaneous Not Given Noah Lomas RN     09/22/2023 9837 EDT insulin lispro (HumaLOG) 100 units/mL subcutaneous injection 5 Units 5 Units Subcutaneous Not Given Noah Lomas RN     09/21/2023 1737 EDT insulin lispro (HumaLOG) 100 units/mL subcutaneous injection 5 Units 5 Units Subcutaneous Given Abbey Kovacs RN     09/22/2023 1152 EDT insulin lispro (HumaLOG) 100 units/mL subcutaneous injection 1-6 Units -- Subcutaneous Not Given Noah Lomas RN     09/22/2023 6865 EDT insulin lispro (HumaLOG) 100 units/mL subcutaneous injection 1-6 Units -- Subcutaneous Not Given Noah Lomas RN     09/21/2023 1737 EDT insulin lispro (HumaLOG) 100 units/mL subcutaneous injection 1-6 Units 1 Units Subcutaneous Given Abbey Kovacs, BRANDON     09/22/2023 1648 EDT levothyroxine tablet 175 mcg 175 mcg Oral Given Darrow Dubin, RN     09/22/2023 1231 EDT enoxaparin (LOVENOX) subcutaneous injection 40 mg 40 mg Subcutaneous Not Given Brimfield Rudolph, BRANDON     09/22/2023 0628 EDT ondansetron (ZOFRAN) injection 4 mg 4 mg Intravenous Given Medina Rudolph, BRANDON     09/21/2023 1328 EDT ondansetron (ZOFRAN) injection 4 mg 4 mg Intravenous Given Rosanne Miranda RN     09/22/2023 0282 EDT azithromycin (ZITHROMAX) tablet 500 mg 500 mg Oral Given Brimfield Rudolph, BRANDON     09/21/2023 2115 EDT melatonin tablet 3 mg 3 mg Oral Given Osmin Gamino, BRANDON     09/22/2023 6812 EDT cefTRIAXone (ROCEPHIN) IVPB (premix in dextrose) 2,000 mg 50 mL 2,000 mg Intravenous [address] [facility], [place]     09/22/2023 0562 EDT pantoprazole (PROTONIX) injection 40 mg 40 mg Intravenous Given Adam Galdamez, BRANDON     09/21/2023 2115 EDT pantoprazole (PROTONIX) injection 40 mg 40 mg Intravenous Given Osmin Gamino RN     09/21/2023 2113 EDT furosemide (LASIX) injection 20 mg 20 mg Intravenous Given Osmin Gamino RN

## 2023-09-22 NOTE — QUICK NOTE
Notified by RN hgb this am is 6.7 up from 5.4 yesterday pm s/p 1 u prbcs. VSS. Will order to prepare and transfuse 1 u prbcs for this am for anemia.  continue to  Monitor hgb

## 2023-09-23 LAB
ABO GROUP BLD BPU: NORMAL
ANION GAP SERPL CALCULATED.3IONS-SCNC: 6 MMOL/L
ATRIAL RATE: 95 BPM
BACTERIA SPT RESP CULT: ABNORMAL
BPU ID: NORMAL
BUN SERPL-MCNC: 47 MG/DL (ref 5–25)
CALCIUM SERPL-MCNC: 8.4 MG/DL (ref 8.4–10.2)
CHLORIDE SERPL-SCNC: 110 MMOL/L (ref 96–108)
CO2 SERPL-SCNC: 21 MMOL/L (ref 21–32)
CREAT SERPL-MCNC: 1.26 MG/DL (ref 0.6–1.3)
CROSSMATCH: NORMAL
ERYTHROCYTE [DISTWIDTH] IN BLOOD BY AUTOMATED COUNT: 16.4 % (ref 11.6–15.1)
ERYTHROCYTE [DISTWIDTH] IN BLOOD BY AUTOMATED COUNT: 16.6 % (ref 11.6–15.1)
GFR SERPL CREATININE-BSD FRML MDRD: 44 ML/MIN/1.73SQ M
GLUCOSE SERPL-MCNC: 111 MG/DL (ref 65–140)
GLUCOSE SERPL-MCNC: 114 MG/DL (ref 65–140)
GLUCOSE SERPL-MCNC: 163 MG/DL (ref 65–140)
GLUCOSE SERPL-MCNC: 174 MG/DL (ref 65–140)
GLUCOSE SERPL-MCNC: 229 MG/DL (ref 65–140)
GRAM STN SPEC: ABNORMAL
HCT VFR BLD AUTO: 23.6 % (ref 34.8–46.1)
HCT VFR BLD AUTO: 25.2 % (ref 34.8–46.1)
HGB BLD-MCNC: 7.3 G/DL (ref 11.5–15.4)
HGB BLD-MCNC: 7.9 G/DL (ref 11.5–15.4)
MCH RBC QN AUTO: 28.5 PG (ref 26.8–34.3)
MCH RBC QN AUTO: 28.9 PG (ref 26.8–34.3)
MCHC RBC AUTO-ENTMCNC: 30.9 G/DL (ref 31.4–37.4)
MCHC RBC AUTO-ENTMCNC: 31.3 G/DL (ref 31.4–37.4)
MCV RBC AUTO: 92 FL (ref 82–98)
MCV RBC AUTO: 92 FL (ref 82–98)
P AXIS: 57 DEGREES
PLATELET # BLD AUTO: 310 THOUSANDS/UL (ref 149–390)
PLATELET # BLD AUTO: 338 THOUSANDS/UL (ref 149–390)
PMV BLD AUTO: 10.1 FL (ref 8.9–12.7)
PMV BLD AUTO: 9.8 FL (ref 8.9–12.7)
POTASSIUM SERPL-SCNC: 4.1 MMOL/L (ref 3.5–5.3)
PR INTERVAL: 186 MS
QRS AXIS: 29 DEGREES
QRSD INTERVAL: 80 MS
QT INTERVAL: 310 MS
QTC INTERVAL: 389 MS
RBC # BLD AUTO: 2.56 MILLION/UL (ref 3.81–5.12)
RBC # BLD AUTO: 2.73 MILLION/UL (ref 3.81–5.12)
SODIUM SERPL-SCNC: 137 MMOL/L (ref 135–147)
T WAVE AXIS: 270 DEGREES
UNIT DISPENSE STATUS: NORMAL
UNIT PRODUCT CODE: NORMAL
UNIT PRODUCT VOLUME: 350 ML
UNIT RH: NORMAL
VENTRICULAR RATE: 95 BPM
WBC # BLD AUTO: 4.97 THOUSAND/UL (ref 4.31–10.16)
WBC # BLD AUTO: 5.59 THOUSAND/UL (ref 4.31–10.16)

## 2023-09-23 PROCEDURE — 94760 N-INVAS EAR/PLS OXIMETRY 1: CPT

## 2023-09-23 PROCEDURE — 80048 BASIC METABOLIC PNL TOTAL CA: CPT | Performed by: STUDENT IN AN ORGANIZED HEALTH CARE EDUCATION/TRAINING PROGRAM

## 2023-09-23 PROCEDURE — 82948 REAGENT STRIP/BLOOD GLUCOSE: CPT

## 2023-09-23 PROCEDURE — 99232 SBSQ HOSP IP/OBS MODERATE 35: CPT | Performed by: STUDENT IN AN ORGANIZED HEALTH CARE EDUCATION/TRAINING PROGRAM

## 2023-09-23 PROCEDURE — C9113 INJ PANTOPRAZOLE SODIUM, VIA: HCPCS | Performed by: STUDENT IN AN ORGANIZED HEALTH CARE EDUCATION/TRAINING PROGRAM

## 2023-09-23 PROCEDURE — 99233 SBSQ HOSP IP/OBS HIGH 50: CPT | Performed by: PHYSICIAN ASSISTANT

## 2023-09-23 PROCEDURE — 85027 COMPLETE CBC AUTOMATED: CPT | Performed by: STUDENT IN AN ORGANIZED HEALTH CARE EDUCATION/TRAINING PROGRAM

## 2023-09-23 PROCEDURE — 93010 ELECTROCARDIOGRAM REPORT: CPT | Performed by: INTERNAL MEDICINE

## 2023-09-23 RX ORDER — PANTOPRAZOLE SODIUM 40 MG/10ML
40 INJECTION, POWDER, LYOPHILIZED, FOR SOLUTION INTRAVENOUS EVERY 12 HOURS SCHEDULED
Status: DISCONTINUED | OUTPATIENT
Start: 2023-09-23 | End: 2023-09-24

## 2023-09-23 RX ADMIN — MYCOPHENOLATE MOFETIL 500 MG: 250 CAPSULE ORAL at 08:57

## 2023-09-23 RX ADMIN — PANTOPRAZOLE SODIUM 40 MG: 40 INJECTION, POWDER, FOR SOLUTION INTRAVENOUS at 21:37

## 2023-09-23 RX ADMIN — ONDANSETRON 4 MG: 2 INJECTION INTRAMUSCULAR; INTRAVENOUS at 14:35

## 2023-09-23 RX ADMIN — PANTOPRAZOLE SODIUM 40 MG: 40 INJECTION, POWDER, FOR SOLUTION INTRAVENOUS at 08:57

## 2023-09-23 RX ADMIN — INSULIN LISPRO 5 UNITS: 100 INJECTION, SOLUTION INTRAVENOUS; SUBCUTANEOUS at 08:55

## 2023-09-23 RX ADMIN — INSULIN LISPRO 5 UNITS: 100 INJECTION, SOLUTION INTRAVENOUS; SUBCUTANEOUS at 13:00

## 2023-09-23 RX ADMIN — OCTREOTIDE ACETATE 100 MCG: 100 INJECTION, SOLUTION INTRAVENOUS; SUBCUTANEOUS at 05:25

## 2023-09-23 RX ADMIN — OCTREOTIDE ACETATE 100 MCG: 100 INJECTION, SOLUTION INTRAVENOUS; SUBCUTANEOUS at 14:28

## 2023-09-23 RX ADMIN — INSULIN GLARGINE 20 UNITS: 100 INJECTION, SOLUTION SUBCUTANEOUS at 21:34

## 2023-09-23 RX ADMIN — MYCOPHENOLATE MOFETIL 500 MG: 250 CAPSULE ORAL at 21:37

## 2023-09-23 RX ADMIN — INSULIN LISPRO 2 UNITS: 100 INJECTION, SOLUTION INTRAVENOUS; SUBCUTANEOUS at 12:59

## 2023-09-23 RX ADMIN — SODIUM CHLORIDE, PRESERVATIVE FREE 3 ML: 5 INJECTION INTRAVENOUS at 21:38

## 2023-09-23 RX ADMIN — INSULIN LISPRO 5 UNITS: 100 INJECTION, SOLUTION INTRAVENOUS; SUBCUTANEOUS at 17:37

## 2023-09-23 RX ADMIN — ATORVASTATIN CALCIUM 80 MG: 40 TABLET, FILM COATED ORAL at 17:37

## 2023-09-23 RX ADMIN — OCTREOTIDE ACETATE 100 MCG: 100 INJECTION, SOLUTION INTRAVENOUS; SUBCUTANEOUS at 21:37

## 2023-09-23 RX ADMIN — LEVOTHYROXINE SODIUM 175 MCG: 25 TABLET ORAL at 05:26

## 2023-09-23 RX ADMIN — SODIUM CHLORIDE, PRESERVATIVE FREE 3 ML: 5 INJECTION INTRAVENOUS at 08:58

## 2023-09-23 RX ADMIN — SODIUM CHLORIDE 75 ML/HR: 0.9 INJECTION, SOLUTION INTRAVENOUS at 05:29

## 2023-09-23 RX ADMIN — METOPROLOL SUCCINATE 25 MG: 25 TABLET, EXTENDED RELEASE ORAL at 08:57

## 2023-09-23 RX ADMIN — INSULIN LISPRO 1 UNITS: 100 INJECTION, SOLUTION INTRAVENOUS; SUBCUTANEOUS at 08:54

## 2023-09-23 RX ADMIN — MELATONIN TAB 3 MG 3 MG: 3 TAB at 21:38

## 2023-09-23 NOTE — PROGRESS NOTES
Progress Note  Ronda Dominguez 79 y.o. female MRN: [id number]  Unit/Bed#: -01 Encounter: [id number]    Subjective:  Patient is reporting nausea when given octreotide. Patient does report though she has ordered a regular diet for breakfast.  She denies any abdominal pain. She has had no bowel movement since last Tuesday. Objective:     Vitals:   Vitals:    09/23/23 0956   BP:    Pulse:    Resp:    Temp:    SpO2: 95%   ,Body mass index is 35.54 kg/m². Intake/Output Summary (Last 24 hours) at 9/23/2023 1026  Last data filed at 9/23/2023 8703  Gross per 24 hour   Intake 2500 ml   Output 1850 ml   Net 650 ml       Physical Exam:     General Appearance: Alert, appears stated age and cooperative  Lungs: Clear to auscultation bilaterally, no rales or rhonchi, no labored breathing/accessory muscle use  Heart: Regular rate and rhythm, S1, S2 normal, no murmur, click, rub or gallop  Abdomen: Soft, non-tender, non-distended; bowel sounds normal; no masses or no organomegaly  Extremities: No cyanosis, edema    Invasive Devices     Peripheral Intravenous Line  Duration           Peripheral IV 09/21/23 Right Antecubital 2 days          Drain  Duration           External Urinary Catheter 1 day                Lab Results:  No results displayed because visit has over 200 results. Imaging Studies:   I have personally reviewed pertinent imaging studies. EGD with Push Enteroscopy    Addendum Date: 9/22/2023 Addendum:    [address] [place] [address] Endoscopy 2021 [facility] [id number] [phone] DATE OF SERVICE: 9/22/23 PHYSICIAN(S): Attending: Alejandro Tovar MD Fellow: No Staff Documented INDICATION: Anemia POST-OP DIAGNOSIS: See the impression below. PREPROCEDURE: Informed consent was obtained for the procedure, including sedation. Risks of perforation, hemorrhage, adverse drug reaction and aspiration were discussed. The patient was placed in the left lateral decubitus position.  Patient was explained about the risks and benefits of the procedure. Risks including but not limited to bleeding, infection, and perforation were explained in detail. Also explained about less than 100% sensitivity with the exam and other alternatives. PROCEDURE: EGD DETAILS OF PROCEDURE: Patient was taken to the procedure room where a time out was performed to confirm correct patient and correct procedure. The patient underwent monitored anesthesia care, which was administered by an anesthesia professional. The patient's blood pressure, ECG, heart rate, level of consciousness, oxygen and respirations were monitored throughout the procedure. The scope was introduced through the mouth and advanced to the jejunum. Fluoroscopy was not used. The patient experienced no blood loss. The procedure was not difficult. The patient tolerated the procedure well. There were no apparent adverse events. ANESTHESIA INFORMATION: ASA: IV Anesthesia Type: IV Sedation with Anesthesia MEDICATIONS: No administrations occurring from 1313 to 1353 on 09/22/23 FINDINGS: The esophagus appeared normal. 5 cm sliding hiatal hernia (type I hiatal hernia) - GE junction 35 cm from the incisors, diaphragmatic impression 40 cm from the incisors 10 angioectasias in the duodenum and jejunum; tissue ablated completely with 10 applications of argon plasma coagulation SPECIMENS: * No specimens in log * IMPRESSION: Roughly 10 AVMs in the duodenal and proximal jejunum. 2 of these are bleeding prior to contact. These were all ablated using APC with good hemostasis. Scope trauma noted throughout the small intestine that was worsened as patient was on Plavix. The esophagus appeared normal. 5 cm type I hiatal hernia RECOMMENDATION:  Start octreotide 100 mcg TID for AVM disease. Unfortunately this is likely cost prohibitive in the outpatient setting but can consider checking price in the future. Okay to restart regular diet now.   Will place patient on clear liquid diet at midnight and reevaluate in the morning. Patient states has been on Plavix since 2019 for her cardiac stents. Unclear if the benefits of Plavix outweigh the risks as she has recurrent GI bleeding requiring multiple transfusions. Can consider cardiology consult in the future. Also of note there was moderate amount of blood in the small bowel so I do expect patient to have melena that needs to be expelled. Kandee Lesch, MD     Result Date: 9/22/2023  Narrative: Table formatting from the original result was not included. [facility] [place] [facility] [address] [phone] DATE OF SERVICE: 9/22/23 PHYSICIAN(S): Attending: Kandee Lesch, MD Fellow: No Staff Documented INDICATION: Anemia POST-OP DIAGNOSIS: See the impression below. PREPROCEDURE: Informed consent was obtained for the procedure, including sedation. Risks of perforation, hemorrhage, adverse drug reaction and aspiration were discussed. The patient was placed in the left lateral decubitus position. Patient was explained about the risks and benefits of the procedure. Risks including but not limited to bleeding, infection, and perforation were explained in detail. Also explained about less than 100% sensitivity with the exam and other alternatives. PROCEDURE: EGD DETAILS OF PROCEDURE: Patient was taken to the procedure room where a time out was performed to confirm correct patient and correct procedure. The patient underwent monitored anesthesia care, which was administered by an anesthesia professional. The patient's blood pressure, ECG, heart rate, level of consciousness, oxygen and respirations were monitored throughout the procedure. The scope was introduced through the mouth and advanced to the jejunum. Fluoroscopy was not used. The patient experienced no blood loss. The procedure was not difficult. The patient tolerated the procedure well. There were no apparent adverse events.  ANESTHESIA INFORMATION: ASA: IV Anesthesia Type: Anesthesia type not filed in the log. MEDICATIONS: No administrations occurring from 1313 to 1353 on 09/22/23 FINDINGS: The esophagus appeared normal. 5 cm sliding hiatal hernia (type I hiatal hernia) - GE junction 35 cm from the incisors, diaphragmatic impression 40 cm from the incisors 10 angioectasias in the duodenum and jejunum; tissue ablated completely with 10 applications of argon plasma coagulation SPECIMENS: * No specimens in log *     Impression: Roughly 10 AVMs in the duodenal and proximal jejunum. 2 of these are bleeding prior to contact. These were all ablated using APC with good hemostasis. Scope trauma noted throughout the small intestine that was worsened as patient was on Plavix. The esophagus appeared normal. 5 cm type I hiatal hernia RECOMMENDATION:  Start octreotide 100 mcg TID for AVM disease. Unfortunately this is likely because prohibitive in the outpatient setting but can consider rechecking in the future. Okay to restart diet    Murali Washburn MD     XR chest portable    Result Date: 9/21/2023  Narrative: CHEST INDICATION:   sob. COMPARISON: Chest CT 9/21/2023. EXAM PERFORMED/VIEWS:  XR CHEST PORTABLE. FINDINGS: Moderate cardiomegaly. Increased interstitial markings due to pulmonary fibrosis. No effusion or pneumothorax. Upper abdomen normal. Bones normal for age. Impression: Pulmonary fibrosis with no definite acute pulmonary disease. Workstation performed: [id number]     CT chest with contrast    Result Date: 9/21/2023  Narrative: CT CHEST WITH IV CONTRAST INDICATION:   fever, sob. COMPARISON:  None. TECHNIQUE: CT examination of the chest was performed. Multiplanar 2D reformatted images were created from the source data. This examination, like all CT scans performed in the [facility], was performed utilizing techniques to minimize radiation dose exposure, including the use of iterative reconstruction and automated exposure control.  Radiation dose length product (DLP) for this visit:  321 mGy-cm IV Contrast:  100 mL of iohexol (OMNIPAQUE) FINDINGS: LUNGS/PLEURA: Scattered pulmonary and paraseptal emphysematous changes. There is thickening of the interstitial markings with honeycombing seen in the bilateral lower lung fields and in the periphery, findings could be seen with UIP/idiopathic pulmonary fibrosis. Superimposed infection, especially in the lower lung fields considered in the appropriate clinical setting. No discrete focal consolidation is identified. HEART/GREAT VESSELS: Small pericardial effusion. Moderate coronary atherosclerosis. Heart is top normal in size. Mild aortic atherosclerosis. No thoracic aortic aneurysm. MEDIASTINUM AND IDALIA: Shotty mediastinal and hilar lymph nodes, nonspecific. CHEST WALL AND LOWER NECK:  Unremarkable. VISUALIZED STRUCTURES IN THE UPPER ABDOMEN:  Unremarkable. OSSEOUS STRUCTURES: Multilevel degenerative changes of the spine. No acute fracture or destructive osseous lesion. Impression: Scattered pulmonary and paraseptal emphysematous changes. There is thickening of the interstitial markings with honeycombing seen in the bilateral lower lung fields and in the periphery, findings could be seen with UIP/idiopathic pulmonary fibrosis. Superimposed infection, especially in the lower lung fields considered in the appropriate clinical setting. Correlation with the patient's symptoms and laboratory values recommended no discrete focal consolidation is identified. Small pericardial effusion, moderate coronary atherosclerosis, shotty nonspecific hilar and mediastinal lymph nodes, and other findings as above. Workstation performed: [id number]         Assessment & Plan  Severe iron deficiency anemia  History of small bowel AVMs  Melena   -Patient has a history of an iron deficiency anemia due to chronic blood loss from small bowel AVMs requiring multiple transfusions of PRBCs and iron.   -HGB dropped to 5.4 and she was transfused 1 unit of PRBCs with improvement to 6.7. Hemoglobin level this morning 7.3.   -She reports a history of intermittent dark, tarry stools since over the summer.  -She has had prior endoscopic evaluations in [place]:  EGD 1/2022 showed Hurtado's and AVMs in the duodenum treated with APC. VCE 1/2022 showed gastritis and nonbleeding AVM in the distal duodenum/proximal jejunum. EGD with push enteroscopy 1/2023 showed Hurtado's esophagus and multiple AVMs in the duodenum treated with APC. Colonoscopy 5/2021 showed a benign hyperplastic polyp that was removed. -EGD with push enteroscopy from 9/22/2023 shows roughly 10 AVMs in the duodenal and proximal jejunum. 2 of these are bleeding prior to contact. AVMs were all ablated using APC with good hemostasis. -Patient was started on octreotide 100 mcg 3 times a day for AVM disease.   -Advance to regular diet today.  -Consider cardiology consult to discuss benefits of Plavix in light of cardiac stents and ongoing GI bleeding.      The patient will be seen and evaluated by Dr. Lorane Boeck, PA-C  9/23/2023,10:26 AM

## 2023-09-23 NOTE — PROGRESS NOTES
[address]  Progress Note  Name: Elias Fonseca  MRN: [id number]  Unit/Bed#: -01 I Date of Admission: 9/20/2023   Date of Service: 9/23/2023 I Hospital Day: 2    Assessment/Plan   * Acute on chronic respiratory failure with hypoxia [facility])  Assessment & Plan  Now resolved. O2 sat well on RA. 95%  Currently she is afebrile, hemodynamically stable. Acute nontoxic-appearing. Likely multifactorial in settings of chronic interstitial lung disease as well as acute on chronic anemia causing her symptoms. Holding diuretics for now due to elevated urine. He received 2 unit PRBC transfusion. Hemoglobin 7.3. Remain stable off antibiotics. Pulmonology recommendation appreciated. Acute on chronic blood loss anemia  Assessment & Plan  Patient had EGD yesterday noted with multiple AVMs in duodenum and proximal jejunum, some of them were treated with APC. Currently she is hemodynamically stable. Currently hemoglobin 7.3 after receiving 2 unit PRBC transfusion. Some melanotic stool expected due to moderate amount of blood noted in small bowel on EGD with push enteroscopy. Continue with IV Protonix 40 milligram twice daily. Started on octreotide 100 mcg 3 times daily which is likely cause for bleeding on outpatient setting. Currently on regular diet. Continue to monitor CBC for hemoglobin trend. GI recommendation appreciated. CKD (chronic kidney disease)  Assessment & Plan  Lab Results   Component Value Date    EGFR 44 09/23/2023    EGFR 36 09/22/2023    EGFR 43 09/20/2023    CREATININE 1.26 09/23/2023    CREATININE 1.48 (H) 09/22/2023    CREATININE 1.29 09/20/2023     Appears to have CKD with baseline creatinine around 1.2 range. Patient did receive p.o. Lasix as well as IV dose of Lasix yesterday after transfusion to prevent volume overload however now noted with elevated BUN/creatinine.   Did get CT chest with contrast on 09/22/2023  Creatinine improved to 1.26 at baseline. will hold further diuretics for now  Discontinue IV fluids. Monitor BMP tomorrow. Pneumonia  Assessment & Plan  Patient presented with fever, shortness of breath, chest discomfort since 1 day prior to presentation. Fever noted to be 101 on presentation. Noted to have tachypnea. Imaging showing increased interstitial marking suspicion for superimposed infection. Pneumonia ruled out. Procalcitonin negative x2. Monitor off antibiotics. Type 1 diabetes mellitus without complication ([facility])  Assessment & Plan  A1c 6.6 on 09/18/2023 per care everywhere. Controlled. Taking basal insulin 26 units daily along with lispro average of 8 units with meals. Continue Lantus at 20 units at bedtime along with lispro 5 units with meals can titrate as needed  Sliding scale coverage  Hypoglycemic protocol  Carbohydrate controlled diet    Hypertension  Assessment & Plan  Continue lisinopril, metoprolol. Hypothyroid  Assessment & Plan  Continue levothyroxine. Sees endocrinology in [place]. IPF (idiopathic pulmonary fibrosis) ([facility])  Assessment & Plan  History of IPF with pulmonary artery hypertension. Currently on CellCept. Does continue to follow-up with following up with pulmonary in [place]. Pulmonology recommendation appreciated. CAD S/P percutaneous coronary angioplasty  Assessment & Plan  On aspirin, Plavix, statin, metoprolol, lisinopril. Last intervention was 2019. Hold aspirin Plavix for now. VTE Pharmacologic Prophylaxis:   Moderate Risk (Score 3-4) - Pharmacological DVT Prophylaxis Contraindicated. Sequential Compression Devices Ordered. Patient Centered Rounds: I performed bedside rounds with nursing staff today. Discussions with Specialists or Other Care Team Provider: GI    Education and Discussions with Family / Patient: Updated  (significant other) via phone.       Current Length of Stay: 2 day(s)  Current Patient Status: Inpatient   Certification Statement: The patient will continue to require additional inpatient hospital stay due to Monitor hemoglobin trend, currently on IV Protonix, IV octreotide  Discharge Plan: Anticipate discharge in 48 hrs to home. Code Status: Level 1 - Full Code    Subjective:   EGD with push enteroscopy yesterday noted multiple AVMs, 2 of them were treated with APC. Noted some blood in the small bowel. Currently patient is in good spirit. Does report that she is feeling better however does not feel good enough to go home yet. Does report episode of nausea and vomiting after octreotide which resolves quickly spontaneously as per patient and she is been tolerating diet well. No other events reported. Objective:     Vitals:   Temp (24hrs), Av.8 °F (36.6 °C), Min:97.5 °F (36.4 °C), Max:98.1 °F (36.7 °C)    Temp:  [97.5 °F (36.4 °C)-98.1 °F (36.7 °C)] 97.8 °F (36.6 °C)  HR:  [78-96] 79  Resp:  [12-20] 18  BP: (134-156)/(54-70) 134/54  SpO2:  [90 %-100 %] 95 %  Body mass index is 35.54 kg/m². Input and Output Summary (last 24 hours): Intake/Output Summary (Last 24 hours) at 2023 1026  Last data filed at 2023 8417  Gross per 24 hour   Intake 2500 ml   Output 1850 ml   Net 650 ml       Physical Exam:   Physical Exam  Constitutional:       General: She is not in acute distress. Appearance: Normal appearance. She is not ill-appearing, toxic-appearing or diaphoretic. HENT:      Head: Normocephalic and atraumatic. Eyes:      Pupils: Pupils are equal, round, and reactive to light. Cardiovascular:      Rate and Rhythm: Normal rate. Pulses: Normal pulses. Pulmonary:      Effort: Pulmonary effort is normal. No respiratory distress. Breath sounds: Normal breath sounds. No wheezing. Abdominal:      General: Bowel sounds are normal. There is no distension. Tenderness: There is no abdominal tenderness. Musculoskeletal:      Right lower leg: No edema. Left lower leg: No edema. Comments: History of right TMA. Neurological:      Mental Status: She is alert and oriented to person, place, and time. Psychiatric:         Mood and Affect: Mood normal.         Behavior: Behavior normal.           Additional Data:     Labs:  Results from last 7 days   Lab Units 09/23/23  0500 09/22/23  1554 09/22/23 0437   WBC Thousand/uL 4.97  --  4.29*   HEMOGLOBIN g/dL 7.3*   < > 6.7*   HEMATOCRIT % 23.6*   < > 21.0*   PLATELETS Thousands/uL 310  --  310   NEUTROS PCT %  --   --  69   LYMPHS PCT %  --   --  12*   MONOS PCT %  --   --  10   EOS PCT %  --   --  8*    < > = values in this interval not displayed. Results from last 7 days   Lab Units 09/23/23  0500 09/22/23 0437 09/20/23 2255   SODIUM mmol/L 137   < > 135   POTASSIUM mmol/L 4.1   < > 4.1   CHLORIDE mmol/L 110*   < > 106   CO2 mmol/L 21   < > 19*   BUN mg/dL 47*   < > 49*   CREATININE mg/dL 1.26   < > 1.29   ANION GAP mmol/L 6   < > 10   CALCIUM mg/dL 8.4   < > 9.2   ALBUMIN g/dL  --   --  4.1   TOTAL BILIRUBIN mg/dL  --   --  0.26   ALK PHOS U/L  --   --  57   ALT U/L  --   --  9   AST U/L  --   --  15   GLUCOSE RANDOM mg/dL 163*   < > 117    < > = values in this interval not displayed.      Results from last 7 days   Lab Units 09/20/23  2255   INR  0.96     Results from last 7 days   Lab Units 09/23/23  0732 09/22/23  2040 09/22/23  1538 09/22/23  1103 09/22/23  0705 09/21/23  2008 09/21/23  1627 09/21/23  1056 09/21/23  0652 09/20/23  2238   POC GLUCOSE mg/dl 174* 177* 268* 180* 116 268* 173* 384* 342* 141*         Results from last 7 days   Lab Units 09/22/23  0437 09/20/23  2255 09/20/23  2254   LACTIC ACID mmol/L  --   --  1.7   PROCALCITONIN ng/ml 0.16 0.09  --        Lines/Drains:  Invasive Devices     Peripheral Intravenous Line  Duration           Peripheral IV 09/21/23 Right Antecubital 2 days          Drain  Duration           External Urinary Catheter 1 day                  Recent Cultures (last 7 days):   Results from last 7 days   Lab Units 09/21/23  1500 09/21/23  0340 09/20/23  2311 09/20/23  2255   BLOOD CULTURE   --   --  No Growth at 48 hrs. No Growth at 48 hrs. SPUTUM CULTURE  4+ Growth of  --   --   --    GRAM STAIN RESULT  1+ Epithelial cells per low power field*  No Polys*  2+ Gram negative rods*  2+ Gram positive cocci in chains and clusters*  Rare Gram positive rods*  --   --   --    LEGIONELLA URINARY ANTIGEN   --  Negative  --   --        Last 24 Hours Medication List:   Current Facility-Administered Medications   Medication Dose Route Frequency Provider Last Rate   • acetaminophen  650 mg Oral Q6H PRN Elizabeth Dee MD     • albuterol  2 puff Inhalation Q4H PRN Elizabeth Dee MD     • atorvastatin  80 mg Oral Daily With Dinner Elizabeth Dee MD     • insulin glargine  20 Units Subcutaneous HS Stephanie Maharaj MD     • insulin lispro  1-6 Units Subcutaneous TID AC Elizabeth Dee MD     • insulin lispro  5 Units Subcutaneous TID With Meals Elizabeth Dee MD     • levothyroxine  175 mcg Oral Early Morning Elizabeth Dee MD     • melatonin  3 mg Oral HS Elizabeth Dee MD     • metoprolol succinate  25 mg Oral Daily Elizabeth Dee MD     • mycophenolate  500 mg Oral Q12H [address] Elizabeth Dee MD     • octreotide  100 mcg Intravenous Q8H [address] Marianna Lan PA-C     • ondansetron  4 mg Intravenous Q6H PRN Elizabeth Dee MD     • pantoprazole  40 mg Intravenous Q12H [address] Ham MONTE MD     • sodium chloride (PF)  3 mL Intravenous Q12H [address] Fatmata Lora MD          Today, Patient Was Seen By: Dario Kinney MD    **Please Note: This note may have been constructed using a voice recognition system. **

## 2023-09-23 NOTE — PLAN OF CARE
Problem: INFECTION - ADULT  Goal: Absence or prevention of progression during hospitalization  Description: INTERVENTIONS:  - Assess and monitor for signs and symptoms of infection  - Monitor lab/diagnostic results  - Monitor all insertion sites, i.e. indwelling lines, tubes, and drains  - Monitor endotracheal if appropriate and nasal secretions for changes in amount and color  - [place] appropriate cooling/warming therapies per order  - Administer medications as ordered  - Instruct and encourage patient and family to use good hand hygiene technique  - Identify and instruct in appropriate isolation precautions for identified infection/condition  Outcome: Progressing

## 2023-09-23 NOTE — ASSESSMENT & PLAN NOTE
Patient had EGD yesterday noted with multiple AVMs in duodenum and proximal jejunum, some of them were treated with APC. Currently she is hemodynamically stable. Currently hemoglobin 7.3 after receiving 2 unit PRBC transfusion. Some melanotic stool expected due to moderate amount of blood noted in small bowel on EGD with push enteroscopy. Continue with IV Protonix 40 milligram twice daily. Started on octreotide 100 mcg 3 times daily which is likely cause for bleeding on outpatient setting. Currently on regular diet. Continue to monitor CBC for hemoglobin trend. GI recommendation appreciated.

## 2023-09-23 NOTE — ASSESSMENT & PLAN NOTE
Lab Results   Component Value Date    EGFR 44 09/23/2023    EGFR 36 09/22/2023    EGFR 43 09/20/2023    CREATININE 1.26 09/23/2023    CREATININE 1.48 (H) 09/22/2023    CREATININE 1.29 09/20/2023     Appears to have CKD with baseline creatinine around 1.2 range. Patient did receive p.o. Lasix as well as IV dose of Lasix yesterday after transfusion to prevent volume overload however now noted with elevated BUN/creatinine. Did get CT chest with contrast on 09/22/2023  Creatinine improved to 1.26 at baseline. will hold further diuretics for now  Discontinue IV fluids. Monitor BMP tomorrow.

## 2023-09-23 NOTE — ASSESSMENT & PLAN NOTE
On aspirin, Plavix, statin, metoprolol, lisinopril. Last intervention was 2019. Hold aspirin Plavix for now.

## 2023-09-23 NOTE — ASSESSMENT & PLAN NOTE
Now resolved. O2 sat well on RA. 95%  Currently she is afebrile, hemodynamically stable. Acute nontoxic-appearing. Likely multifactorial in settings of chronic interstitial lung disease as well as acute on chronic anemia causing her symptoms. Holding diuretics for now due to elevated urine. He received 2 unit PRBC transfusion. Hemoglobin 7.3. Remain stable off antibiotics. Pulmonology recommendation appreciated.

## 2023-09-24 LAB
GLUCOSE SERPL-MCNC: 101 MG/DL (ref 65–140)
GLUCOSE SERPL-MCNC: 127 MG/DL (ref 65–140)
GLUCOSE SERPL-MCNC: 254 MG/DL (ref 65–140)
GLUCOSE SERPL-MCNC: 301 MG/DL (ref 65–140)

## 2023-09-24 PROCEDURE — 82948 REAGENT STRIP/BLOOD GLUCOSE: CPT

## 2023-09-24 PROCEDURE — 94664 DEMO&/EVAL PT USE INHALER: CPT

## 2023-09-24 PROCEDURE — 99232 SBSQ HOSP IP/OBS MODERATE 35: CPT | Performed by: STUDENT IN AN ORGANIZED HEALTH CARE EDUCATION/TRAINING PROGRAM

## 2023-09-24 PROCEDURE — C9113 INJ PANTOPRAZOLE SODIUM, VIA: HCPCS | Performed by: STUDENT IN AN ORGANIZED HEALTH CARE EDUCATION/TRAINING PROGRAM

## 2023-09-24 RX ORDER — INSULIN LISPRO 100 [IU]/ML
8 INJECTION, SOLUTION INTRAVENOUS; SUBCUTANEOUS
Status: DISCONTINUED | OUTPATIENT
Start: 2023-09-24 | End: 2023-09-26 | Stop reason: HOSPADM

## 2023-09-24 RX ORDER — FUROSEMIDE 20 MG/1
20 TABLET ORAL 2 TIMES DAILY
Status: DISCONTINUED | OUTPATIENT
Start: 2023-09-24 | End: 2023-09-26 | Stop reason: HOSPADM

## 2023-09-24 RX ORDER — PANTOPRAZOLE SODIUM 40 MG/10ML
40 INJECTION, POWDER, LYOPHILIZED, FOR SOLUTION INTRAVENOUS EVERY 12 HOURS SCHEDULED
Status: DISCONTINUED | OUTPATIENT
Start: 2023-09-24 | End: 2023-09-26 | Stop reason: HOSPADM

## 2023-09-24 RX ORDER — INSULIN GLARGINE 100 [IU]/ML
26 INJECTION, SOLUTION SUBCUTANEOUS
Status: DISCONTINUED | OUTPATIENT
Start: 2023-09-24 | End: 2023-09-26 | Stop reason: HOSPADM

## 2023-09-24 RX ADMIN — MYCOPHENOLATE MOFETIL 500 MG: 250 CAPSULE ORAL at 21:05

## 2023-09-24 RX ADMIN — INSULIN LISPRO 4 UNITS: 100 INJECTION, SOLUTION INTRAVENOUS; SUBCUTANEOUS at 09:06

## 2023-09-24 RX ADMIN — INSULIN LISPRO 3 UNITS: 100 INJECTION, SOLUTION INTRAVENOUS; SUBCUTANEOUS at 12:34

## 2023-09-24 RX ADMIN — INSULIN LISPRO 8 UNITS: 100 INJECTION, SOLUTION INTRAVENOUS; SUBCUTANEOUS at 17:47

## 2023-09-24 RX ADMIN — INSULIN LISPRO 8 UNITS: 100 INJECTION, SOLUTION INTRAVENOUS; SUBCUTANEOUS at 12:35

## 2023-09-24 RX ADMIN — INSULIN GLARGINE 26 UNITS: 100 INJECTION, SOLUTION SUBCUTANEOUS at 21:04

## 2023-09-24 RX ADMIN — SODIUM CHLORIDE, PRESERVATIVE FREE 3 ML: 5 INJECTION INTRAVENOUS at 09:12

## 2023-09-24 RX ADMIN — PANTOPRAZOLE SODIUM 40 MG: 40 INJECTION, POWDER, FOR SOLUTION INTRAVENOUS at 21:06

## 2023-09-24 RX ADMIN — INSULIN LISPRO 5 UNITS: 100 INJECTION, SOLUTION INTRAVENOUS; SUBCUTANEOUS at 09:12

## 2023-09-24 RX ADMIN — SODIUM CHLORIDE, PRESERVATIVE FREE 3 ML: 5 INJECTION INTRAVENOUS at 21:06

## 2023-09-24 RX ADMIN — ATORVASTATIN CALCIUM 80 MG: 40 TABLET, FILM COATED ORAL at 17:46

## 2023-09-24 RX ADMIN — MELATONIN TAB 3 MG 3 MG: 3 TAB at 21:04

## 2023-09-24 RX ADMIN — PANTOPRAZOLE SODIUM 40 MG: 40 INJECTION, POWDER, FOR SOLUTION INTRAVENOUS at 09:08

## 2023-09-24 RX ADMIN — MYCOPHENOLATE MOFETIL 500 MG: 250 CAPSULE ORAL at 09:07

## 2023-09-24 RX ADMIN — OCTREOTIDE ACETATE 100 MCG: 100 INJECTION, SOLUTION INTRAVENOUS; SUBCUTANEOUS at 05:26

## 2023-09-24 RX ADMIN — FUROSEMIDE 20 MG: 20 TABLET ORAL at 17:46

## 2023-09-24 RX ADMIN — ONDANSETRON 4 MG: 2 INJECTION INTRAMUSCULAR; INTRAVENOUS at 05:35

## 2023-09-24 RX ADMIN — LEVOTHYROXINE SODIUM 175 MCG: 25 TABLET ORAL at 05:26

## 2023-09-24 RX ADMIN — METOPROLOL SUCCINATE 25 MG: 25 TABLET, EXTENDED RELEASE ORAL at 09:07

## 2023-09-24 NOTE — ASSESSMENT & PLAN NOTE
On aspirin, Plavix, statin, metoprolol, lisinopril. Last intervention was 2019. Resume aspirin, discontinue Plavix.

## 2023-09-24 NOTE — PLAN OF CARE
Problem: INFECTION - ADULT  Goal: Absence or prevention of progression during hospitalization  Description: INTERVENTIONS:  - Assess and monitor for signs and symptoms of infection  - Monitor lab/diagnostic results  - Monitor all insertion sites, i.e. indwelling lines, tubes, and drains  - Monitor endotracheal if appropriate and nasal secretions for changes in amount and color  - [place] appropriate cooling/warming therapies per order  - Administer medications as ordered  - Instruct and encourage patient and family to use good hand hygiene technique  - Identify and instruct in appropriate isolation precautions for identified infection/condition  Outcome: Progressing

## 2023-09-24 NOTE — RESPIRATORY THERAPY NOTE
RT Protocol Note  Bharati Nieves 79 y.o. female MRN: [id number]  Unit/Bed#: -01 Encounter: [id number]    Assessment    Principal Problem:    Acute on chronic respiratory failure with hypoxia ([address])  Active Problems:    CAD S/P percutaneous coronary angioplasty    IPF (idiopathic pulmonary fibrosis) (HCC)    Hypothyroid    Hypertension    Type 1 diabetes mellitus without complication (HCC)    Pneumonia    Acute on chronic blood loss anemia    CKD (chronic kidney disease)      Home Pulmonary Medications:  O2 2 LPM  Home Devices/Therapy: Home O2    Past Medical History:   Diagnosis Date    Diabetes mellitus ([address])     type 1    Heart attack ([address])     Peripheral arterial disease (HCC)     Pneumonia      Subjective         Objective    Physical Exam:   Assessment Type: Assess only  General Appearance: Alert, Awake  Respiratory Pattern: Normal  Chest Assessment: Chest expansion symmetrical  Bilateral Breath Sounds: Diminished, Coarse  Cough: Dry  O2 Device: RA    Vitals:  Blood pressure 137/53, pulse 72, temperature 97.6 °F (36.4 °C), resp. rate 18, height 5' 6.5" (1.689 m), weight 101 kg (223 lb 8.7 oz), SpO2 93 %. O2 Device: RA     Plan    Respiratory Plan: No distress/Pulmonary history        Resp Comments: Patient uses 2 lpm PRN at home, no nebs or MDI use in home.

## 2023-09-24 NOTE — ASSESSMENT & PLAN NOTE
Lab Results   Component Value Date    EGFR 44 09/23/2023    EGFR 36 09/22/2023    EGFR 43 09/20/2023    CREATININE 1.26 09/23/2023    CREATININE 1.48 (H) 09/22/2023    CREATININE 1.29 09/20/2023     Appears to have CKD with baseline creatinine around 1.2 range. Patient did receive p.o. Lasix as well as IV dose of Lasix yesterday after transfusion to prevent volume overload however now noted with elevated BUN/creatinine. Did get CT chest with contrast on 09/22/2023  Creatinine improved to 1.26 at baseline. Resume home dose of Lasix.

## 2023-09-24 NOTE — PLAN OF CARE
Problem: INFECTION - ADULT  Goal: Absence or prevention of progression during hospitalization  Description: INTERVENTIONS:  - Assess and monitor for signs and symptoms of infection  - Monitor lab/diagnostic results  - Monitor all insertion sites, i.e. indwelling lines, tubes, and drains  - Monitor endotracheal if appropriate and nasal secretions for changes in amount and color  - [place] appropriate cooling/warming therapies per order  - Administer medications as ordered  - Instruct and encourage patient and family to use good hand hygiene technique  - Identify and instruct in appropriate isolation precautions for identified infection/condition  Outcome: Progressing

## 2023-09-24 NOTE — ASSESSMENT & PLAN NOTE
A1c 6.6 on 09/18/2023 per care everywhere. Controlled. Taking basal insulin 26 units daily along with lispro average of 8 units with meals.     Resume home dose of Lantus 26 units at bedtime along with lispro 8 units with meals can titrate as needed  Sliding scale coverage  Hypoglycemic protocol  Carbohydrate controlled diet

## 2023-09-24 NOTE — ASSESSMENT & PLAN NOTE
Now resolved. O2 sat well on RA. 95%  Currently she is afebrile, hemodynamically stable. Acute nontoxic-appearing. Likely multifactorial in settings of chronic interstitial lung disease as well as acute on chronic anemia causing her symptoms. Resume home dose of Lasix. He received 2 unit PRBC transfusion. Hemoglobin 7.9  Remain stable off antibiotics. Pulmonology recommendation appreciated.

## 2023-09-24 NOTE — PROGRESS NOTES
[address]  Progress Note  Name: Sergio Mead  MRN: [id number]  Unit/Bed#: -01 I Date of Admission: 9/20/2023   Date of Service: 9/24/2023 I Hospital Day: 3    Assessment/Plan   * Acute on chronic respiratory failure with hypoxia [facility])  Assessment & Plan  Now resolved. O2 sat well on RA. 95%  Currently she is afebrile, hemodynamically stable. Acute nontoxic-appearing. Likely multifactorial in settings of chronic interstitial lung disease as well as acute on chronic anemia causing her symptoms. Resume home dose of Lasix. He received 2 unit PRBC transfusion. Hemoglobin 7.9  Remain stable off antibiotics. Pulmonology recommendation appreciated. Acute on chronic blood loss anemia  Assessment & Plan  Patient had EGD yesterday noted with multiple AVMs in duodenum and proximal jejunum, some of them were treated with APC. Currently she is hemodynamically stable. Currently hemoglobin 7.9 after receiving 2 unit PRBC transfusion. Some melanotic stool expected due to moderate amount of blood noted in small bowel on EGD with push enteroscopy. Continue with IV Protonix 40 milligram twice daily. Discontinue octreotide since not tolerating well. Currently on regular diet. Continue to monitor CBC for hemoglobin trend. GI recommendation appreciated. CKD (chronic kidney disease)  Assessment & Plan  Lab Results   Component Value Date    EGFR 44 09/23/2023    EGFR 36 09/22/2023    EGFR 43 09/20/2023    CREATININE 1.26 09/23/2023    CREATININE 1.48 (H) 09/22/2023    CREATININE 1.29 09/20/2023     Appears to have CKD with baseline creatinine around 1.2 range. Patient did receive p.o. Lasix as well as IV dose of Lasix yesterday after transfusion to prevent volume overload however now noted with elevated BUN/creatinine. Did get CT chest with contrast on 09/22/2023  Creatinine improved to 1.26 at baseline. Resume home dose of Lasix.       Pneumonia  Assessment & Plan  Patient presented with fever, shortness of breath, chest discomfort since 1 day prior to presentation. Fever noted to be 101 on presentation. Noted to have tachypnea. Imaging showing increased interstitial marking suspicion for superimposed infection. Pneumonia ruled out. Procalcitonin negative x2. Monitor off antibiotics. Type 1 diabetes mellitus without complication (HCC)  Assessment & Plan  A1c 6.6 on 09/18/2023 per care everywhere. Controlled. Taking basal insulin 26 units daily along with lispro average of 8 units with meals. Resume home dose of Lantus 26 units at bedtime along with lispro 8 units with meals can titrate as needed  Sliding scale coverage  Hypoglycemic protocol  Carbohydrate controlled diet    Hypertension  Assessment & Plan  Continue lisinopril, metoprolol. Hypothyroid  Assessment & Plan  Continue levothyroxine. Sees endocrinology in [place]. IPF (idiopathic pulmonary fibrosis) ([facility])  Assessment & Plan  History of IPF with pulmonary artery hypertension. Currently on CellCept. Does continue to follow-up with following up with pulmonary in [place]. Pulmonology recommendation appreciated. CAD S/P percutaneous coronary angioplasty  Assessment & Plan  On aspirin, Plavix, statin, metoprolol, lisinopril. Last intervention was 2019. Resume aspirin, discontinue Plavix. VTE Pharmacologic Prophylaxis:   Moderate Risk (Score 3-4) - Pharmacological DVT Prophylaxis Ordered: ASA. Patient Centered Rounds: I performed bedside rounds with nursing staff today. Discussions with Specialists or Other Care Team Provider: Gi    Education and Discussions with Family / Patient: Updated  (significant other) via phone.     Current Length of Stay: 3 day(s)  Current Patient Status: Inpatient   Certification Statement: The patient will continue to require additional inpatient hospital stay due to Monitoring Hemoglobin level  Discharge Plan: Anticipate discharge tomorrow to home. Code Status: Level 1 - Full Code    Subjective:   Seen during AM rounds. Hemoglobin 7.9. Patient is a complaining of nausea and vomiting with octreotide. denies any other new complaints. No other events reported. Objective:     Vitals:   Temp (24hrs), Av.7 °F (36.5 °C), Min:97.6 °F (36.4 °C), Max:97.9 °F (36.6 °C)    Temp:  [97.6 °F (36.4 °C)-97.9 °F (36.6 °C)] 97.9 °F (36.6 °C)  HR:  [72-88] 76  Resp:  [18] 18  BP: (137-157)/(53-61) 143/55  SpO2:  [93 %-98 %] 95 %  Body mass index is 35.54 kg/m². Input and Output Summary (last 24 hours): Intake/Output Summary (Last 24 hours) at 2023 1253  Last data filed at 2023 0601  Gross per 24 hour   Intake 300 ml   Output 800 ml   Net -500 ml       Physical Exam:   Physical Exam  Constitutional:       General: She is not in acute distress. Appearance: Normal appearance. She is not ill-appearing, toxic-appearing or diaphoretic. HENT:      Head: Normocephalic and atraumatic. Eyes:      Pupils: Pupils are equal, round, and reactive to light. Cardiovascular:      Rate and Rhythm: Normal rate. Pulses: Normal pulses. Pulmonary:      Effort: Pulmonary effort is normal. No respiratory distress. Breath sounds: Normal breath sounds. No wheezing. Abdominal:      General: Bowel sounds are normal. There is no distension. Tenderness: There is no abdominal tenderness. Musculoskeletal:      Right lower leg: No edema. Left lower leg: No edema. Comments: History of right TMA. Neurological:      Mental Status: She is alert and oriented to person, place, and time.    Psychiatric:         Mood and Affect: Mood normal.         Behavior: Behavior normal.          Additional Data:     Labs:  Results from last 7 days   Lab Units 23  1855 23  1554 23  0437   WBC Thousand/uL 5.59   < > 4.29*   HEMOGLOBIN g/dL 7.9*   < > 6.7*   HEMATOCRIT % 25.2*   < > 21.0*   PLATELETS Thousands/uL 338   < > 310 NEUTROS PCT %  --   --  69   LYMPHS PCT %  --   --  12*   MONOS PCT %  --   --  10   EOS PCT %  --   --  8*    < > = values in this interval not displayed. Results from last 7 days   Lab Units 09/23/23  0500 09/22/23 0437 09/20/23 2255   SODIUM mmol/L 137   < > 135   POTASSIUM mmol/L 4.1   < > 4.1   CHLORIDE mmol/L 110*   < > 106   CO2 mmol/L 21   < > 19*   BUN mg/dL 47*   < > 49*   CREATININE mg/dL 1.26   < > 1.29   ANION GAP mmol/L 6   < > 10   CALCIUM mg/dL 8.4   < > 9.2   ALBUMIN g/dL  --   --  4.1   TOTAL BILIRUBIN mg/dL  --   --  0.26   ALK PHOS U/L  --   --  57   ALT U/L  --   --  9   AST U/L  --   --  15   GLUCOSE RANDOM mg/dL 163*   < > 117    < > = values in this interval not displayed. Results from last 7 days   Lab Units 09/20/23 2255   INR  0.96     Results from last 7 days   Lab Units 09/24/23  1131 09/24/23  0711 09/23/23  2116 09/23/23  1600 09/23/23  1102 09/23/23  0732 09/22/23  2040 09/22/23  1538 09/22/23  1103 09/22/23  0705 09/21/23 2008 09/21/23  1627   POC GLUCOSE mg/dl 254* 301* 111 114 229* 174* 177* 268* 180* 116 268* 173*         Results from last 7 days   Lab Units 09/22/23 0437 09/20/23 2255 09/20/23 2254   LACTIC ACID mmol/L  --   --  1.7   PROCALCITONIN ng/ml 0.16 0.09  --        Lines/Drains:  Invasive Devices     Peripheral Intravenous Line  Duration           Peripheral IV 09/21/23 Right Antecubital 3 days          Drain  Duration           External Urinary Catheter 2 days                    Recent Cultures (last 7 days):   Results from last 7 days   Lab Units 09/21/23  1500 09/21/23  0340 09/20/23  2311 09/20/23 2255   BLOOD CULTURE   --   --  No Growth at 72 hrs. No Growth at 72 hrs.    SPUTUM CULTURE  4+ Growth of  --   --   --    GRAM STAIN RESULT  1+ Epithelial cells per low power field*  No Polys*  2+ Gram negative rods*  2+ Gram positive cocci in chains and clusters*  Rare Gram positive rods*  --   --   --    LEGIONELLA URINARY ANTIGEN   --  Negative  -- --        Last 24 Hours Medication List:   Current Facility-Administered Medications   Medication Dose Route Frequency Provider Last Rate   • acetaminophen  650 mg Oral Q6H PRN Altagracia Guerrero MD     • albuterol  2 puff Inhalation Q4H PRN Altagracia Guerrero MD     • atorvastatin  80 mg Oral Daily With Marisol Hay MD     • insulin glargine  26 Units Subcutaneous HS Dennis Montes MD     • insulin lispro  1-6 Units Subcutaneous TID AC Altagracia Guerrero MD     • insulin lispro  8 Units Subcutaneous TID With Meals Dennis Montes MD     • levothyroxine  175 mcg Oral Early Morning Altagracia Guerrero MD     • melatonin  3 mg Oral HS Altagracia Guerrero MD     • metoprolol succinate  25 mg Oral Daily Altagracia Guerrero MD     • mycophenolate  500 mg Oral Q12H [address] Altagracia Guerrero MD     • ondansetron  4 mg Intravenous Q6H PRN Altagracia Guerrero MD     • pantoprazole  40 mg Intravenous Q12H [address] Ham MONTE MD     • sodium chloride (PF)  3 mL Intravenous Q12H [address] Rick Marin MD          Today, Patient Was Seen By: Dennis Montes MD    **Please Note: This note may have been constructed using a voice recognition system. **

## 2023-09-24 NOTE — ASSESSMENT & PLAN NOTE
Patient had EGD yesterday noted with multiple AVMs in duodenum and proximal jejunum, some of them were treated with APC. Currently she is hemodynamically stable. Currently hemoglobin 7.9 after receiving 2 unit PRBC transfusion. Some melanotic stool expected due to moderate amount of blood noted in small bowel on EGD with push enteroscopy. Continue with IV Protonix 40 milligram twice daily. Discontinue octreotide since not tolerating well. Currently on regular diet. Continue to monitor CBC for hemoglobin trend. GI recommendation appreciated.

## 2023-09-25 LAB
ERYTHROCYTE [DISTWIDTH] IN BLOOD BY AUTOMATED COUNT: 16.2 % (ref 11.6–15.1)
GLUCOSE SERPL-MCNC: 105 MG/DL (ref 65–140)
GLUCOSE SERPL-MCNC: 176 MG/DL (ref 65–140)
GLUCOSE SERPL-MCNC: 184 MG/DL (ref 65–140)
GLUCOSE SERPL-MCNC: 73 MG/DL (ref 65–140)
HCT VFR BLD AUTO: 24 % (ref 34.8–46.1)
HCT VFR BLD AUTO: 25.9 % (ref 34.8–46.1)
HGB BLD-MCNC: 7.5 G/DL (ref 11.5–15.4)
HGB BLD-MCNC: 8.1 G/DL (ref 11.5–15.4)
MCH RBC QN AUTO: 28.8 PG (ref 26.8–34.3)
MCHC RBC AUTO-ENTMCNC: 31.3 G/DL (ref 31.4–37.4)
MCV RBC AUTO: 92 FL (ref 82–98)
PLATELET # BLD AUTO: 350 THOUSANDS/UL (ref 149–390)
PMV BLD AUTO: 9.9 FL (ref 8.9–12.7)
RBC # BLD AUTO: 2.6 MILLION/UL (ref 3.81–5.12)
WBC # BLD AUTO: 5.91 THOUSAND/UL (ref 4.31–10.16)

## 2023-09-25 PROCEDURE — 99232 SBSQ HOSP IP/OBS MODERATE 35: CPT | Performed by: STUDENT IN AN ORGANIZED HEALTH CARE EDUCATION/TRAINING PROGRAM

## 2023-09-25 PROCEDURE — C9113 INJ PANTOPRAZOLE SODIUM, VIA: HCPCS | Performed by: STUDENT IN AN ORGANIZED HEALTH CARE EDUCATION/TRAINING PROGRAM

## 2023-09-25 PROCEDURE — 85027 COMPLETE CBC AUTOMATED: CPT | Performed by: STUDENT IN AN ORGANIZED HEALTH CARE EDUCATION/TRAINING PROGRAM

## 2023-09-25 PROCEDURE — 85014 HEMATOCRIT: CPT | Performed by: STUDENT IN AN ORGANIZED HEALTH CARE EDUCATION/TRAINING PROGRAM

## 2023-09-25 PROCEDURE — 82948 REAGENT STRIP/BLOOD GLUCOSE: CPT

## 2023-09-25 PROCEDURE — 99232 SBSQ HOSP IP/OBS MODERATE 35: CPT | Performed by: INTERNAL MEDICINE

## 2023-09-25 PROCEDURE — 85018 HEMOGLOBIN: CPT | Performed by: STUDENT IN AN ORGANIZED HEALTH CARE EDUCATION/TRAINING PROGRAM

## 2023-09-25 RX ADMIN — LEVOTHYROXINE SODIUM 175 MCG: 25 TABLET ORAL at 05:42

## 2023-09-25 RX ADMIN — PANTOPRAZOLE SODIUM 40 MG: 40 INJECTION, POWDER, FOR SOLUTION INTRAVENOUS at 09:02

## 2023-09-25 RX ADMIN — INSULIN LISPRO 8 UNITS: 100 INJECTION, SOLUTION INTRAVENOUS; SUBCUTANEOUS at 09:04

## 2023-09-25 RX ADMIN — FUROSEMIDE 20 MG: 20 TABLET ORAL at 08:59

## 2023-09-25 RX ADMIN — PANTOPRAZOLE SODIUM 40 MG: 40 INJECTION, POWDER, FOR SOLUTION INTRAVENOUS at 21:30

## 2023-09-25 RX ADMIN — MYCOPHENOLATE MOFETIL 500 MG: 250 CAPSULE ORAL at 21:30

## 2023-09-25 RX ADMIN — FUROSEMIDE 20 MG: 20 TABLET ORAL at 17:56

## 2023-09-25 RX ADMIN — METOPROLOL SUCCINATE 25 MG: 25 TABLET, EXTENDED RELEASE ORAL at 08:59

## 2023-09-25 RX ADMIN — INSULIN LISPRO 8 UNITS: 100 INJECTION, SOLUTION INTRAVENOUS; SUBCUTANEOUS at 17:55

## 2023-09-25 RX ADMIN — SODIUM CHLORIDE, PRESERVATIVE FREE 3 ML: 5 INJECTION INTRAVENOUS at 21:30

## 2023-09-25 RX ADMIN — MYCOPHENOLATE MOFETIL 500 MG: 250 CAPSULE ORAL at 08:59

## 2023-09-25 RX ADMIN — INSULIN GLARGINE 26 UNITS: 100 INJECTION, SOLUTION SUBCUTANEOUS at 21:30

## 2023-09-25 RX ADMIN — ASPIRIN 81 MG: 81 TABLET, COATED ORAL at 09:01

## 2023-09-25 RX ADMIN — INSULIN LISPRO 8 UNITS: 100 INJECTION, SOLUTION INTRAVENOUS; SUBCUTANEOUS at 12:11

## 2023-09-25 RX ADMIN — MELATONIN TAB 3 MG 3 MG: 3 TAB at 22:02

## 2023-09-25 RX ADMIN — SODIUM CHLORIDE, PRESERVATIVE FREE 3 ML: 5 INJECTION INTRAVENOUS at 08:58

## 2023-09-25 RX ADMIN — INSULIN LISPRO 1 UNITS: 100 INJECTION, SOLUTION INTRAVENOUS; SUBCUTANEOUS at 12:11

## 2023-09-25 RX ADMIN — ATORVASTATIN CALCIUM 80 MG: 40 TABLET, FILM COATED ORAL at 17:55

## 2023-09-25 NOTE — ASSESSMENT & PLAN NOTE
Patient had EGD yesterday noted with multiple AVMs in duodenum and proximal jejunum, some of them were treated with APC. Currently she is hemodynamically stable. Currently hemoglobin 7.5 after receiving 2 unit PRBC transfusion. Some melanotic stool expected due to moderate amount of blood noted in small bowel on EGD with push enteroscopy. Continue with IV Protonix 40 milligram twice daily. Discontinue octreotide since not tolerating well. Currently on regular diet. Continue to monitor CBC for hemoglobin trend. GI recommendation appreciated.

## 2023-09-25 NOTE — ASSESSMENT & PLAN NOTE
On aspirin, Plavix, statin, metoprolol, lisinopril. Last intervention was 2019. HOLD aspirin for now. discontinue Plavix.

## 2023-09-25 NOTE — PROGRESS NOTES
[address]  Progress Note  Name: Mily Bender  MRN: [id number]  Unit/Bed#: -01 I Date of Admission: 9/20/2023   Date of Service: 9/25/2023 I Hospital Day: 4    Assessment/Plan   * Acute on chronic respiratory failure with hypoxia [facility])  Assessment & Plan  Now resolved. O2 sat well on RA. 95%  Currently she is afebrile, hemodynamically stable. Acute nontoxic-appearing. Likely multifactorial in settings of chronic interstitial lung disease as well as acute on chronic anemia causing her symptoms. Resume home dose of Lasix. He received 2 unit PRBC transfusion. Hemoglobin 7.5  Remain stable off antibiotics. Pulmonology recommendation appreciated. Acute on chronic blood loss anemia  Assessment & Plan  Patient had EGD yesterday noted with multiple AVMs in duodenum and proximal jejunum, some of them were treated with APC. Currently she is hemodynamically stable. Currently hemoglobin 7.5 after receiving 2 unit PRBC transfusion. Some melanotic stool expected due to moderate amount of blood noted in small bowel on EGD with push enteroscopy. Continue with IV Protonix 40 milligram twice daily. Discontinue octreotide since not tolerating well. Currently on regular diet. Continue to monitor CBC for hemoglobin trend. GI recommendation appreciated. CKD (chronic kidney disease)  Assessment & Plan  Lab Results   Component Value Date    EGFR 44 09/23/2023    EGFR 36 09/22/2023    EGFR 43 09/20/2023    CREATININE 1.26 09/23/2023    CREATININE 1.48 (H) 09/22/2023    CREATININE 1.29 09/20/2023     Appears to have CKD with baseline creatinine around 1.2 range. Patient did receive p.o. Lasix as well as IV dose of Lasix yesterday after transfusion to prevent volume overload however now noted with elevated BUN/creatinine. Did get CT chest with contrast on 09/22/2023  Creatinine improved to 1.26 at baseline. Resume home dose of Lasix.       Pneumonia  Assessment & Plan  Patient presented with fever, shortness of breath, chest discomfort since 1 day prior to presentation. Fever noted to be 101 on presentation. Noted to have tachypnea. Imaging showing increased interstitial marking suspicion for superimposed infection. Pneumonia ruled out. Procalcitonin negative x2. Monitor off antibiotics. Type 1 diabetes mellitus without complication (HCC)  Assessment & Plan  A1c 6.6 on 09/18/2023 per care everywhere. Controlled. Taking basal insulin 26 units daily along with lispro average of 8 units with meals. Resume home dose of Lantus 26 units at bedtime along with lispro 8 units with meals can titrate as needed  Sliding scale coverage  Hypoglycemic protocol  Carbohydrate controlled diet    Hypertension  Assessment & Plan  Continue lisinopril, metoprolol. Hypothyroid  Assessment & Plan  Continue levothyroxine. Sees endocrinology in [place]. IPF (idiopathic pulmonary fibrosis) ([facility])  Assessment & Plan  History of IPF with pulmonary artery hypertension. Currently on CellCept. Does continue to follow-up with following up with pulmonary in [place]. Pulmonology recommendation appreciated. CAD S/P percutaneous coronary angioplasty  Assessment & Plan  On aspirin, Plavix, statin, metoprolol, lisinopril. Last intervention was 2019. HOLD aspirin for now. discontinue Plavix. VTE Pharmacologic Prophylaxis:   Moderate Risk (Score 3-4) - Pharmacological DVT Prophylaxis Contraindicated. Sequential Compression Devices Ordered. Patient Centered Rounds: I performed bedside rounds with nursing staff today. Education and Discussions with Family / Patient: Updated  (significant other) via phone. Current Length of Stay: 4 day(s)  Current Patient Status: Inpatient   Certification Statement: The patient will continue to require additional inpatient hospital stay due to monitoring Hemoglobin  Discharge Plan: Anticipate discharge tomorrow to home.     Code Status: Level 1 - Full Code    Subjective:   Seen during AM rounds. Appears comfortable nondistressed. Complaining of some dark stool which is expected since there was some blood noted on EGD. Hemoglobin stable around 7.5. Denies any other new complaints. No other events reported. Objective:     Vitals:   Temp (24hrs), Av.1 °F (36.7 °C), Min:97.8 °F (36.6 °C), Max:98.2 °F (36.8 °C)    Temp:  [97.8 °F (36.6 °C)-98.2 °F (36.8 °C)] 98.2 °F (36.8 °C)  HR:  [77-91] 81  Resp:  [19-20] 20  BP: (135-157)/(49-58) 157/58  SpO2:  [93 %-96 %] 93 %  Body mass index is 35.54 kg/m². Input and Output Summary (last 24 hours): Intake/Output Summary (Last 24 hours) at 2023 1208  Last data filed at 2023 0858  Gross per 24 hour   Intake 1100 ml   Output 600 ml   Net 500 ml       Physical Exam:   Physical Exam  Constitutional:       General: She is not in acute distress. Appearance: Normal appearance. She is not ill-appearing, toxic-appearing or diaphoretic. HENT:      Head: Normocephalic and atraumatic. Eyes:      Pupils: Pupils are equal, round, and reactive to light. Cardiovascular:      Rate and Rhythm: Normal rate. Pulses: Normal pulses. Pulmonary:      Effort: Pulmonary effort is normal. No respiratory distress. Breath sounds: Normal breath sounds. No wheezing. Abdominal:      General: Bowel sounds are normal. There is no distension. Tenderness: There is no abdominal tenderness. Musculoskeletal:      Right lower leg: No edema. Left lower leg: No edema. Comments: History of right TMA. Neurological:      Mental Status: She is alert and oriented to person, place, and time.    Psychiatric:         Mood and Affect: Mood normal.         Behavior: Behavior normal.          Additional Data:     Labs:  Results from last 7 days   Lab Units 23  0757 23  1554 23  0437   WBC Thousand/uL 5.91   < > 4.29*   HEMOGLOBIN g/dL 7.5*   < > 6.7*   HEMATOCRIT % 24.0*   < > 21.0*   PLATELETS Thousands/uL 350   < > 310   NEUTROS PCT %  --   --  69   LYMPHS PCT %  --   --  12*   MONOS PCT %  --   --  10   EOS PCT %  --   --  8*    < > = values in this interval not displayed. Results from last 7 days   Lab Units 09/23/23  0500 09/22/23  0437 09/20/23  2255   SODIUM mmol/L 137   < > 135   POTASSIUM mmol/L 4.1   < > 4.1   CHLORIDE mmol/L 110*   < > 106   CO2 mmol/L 21   < > 19*   BUN mg/dL 47*   < > 49*   CREATININE mg/dL 1.26   < > 1.29   ANION GAP mmol/L 6   < > 10   CALCIUM mg/dL 8.4   < > 9.2   ALBUMIN g/dL  --   --  4.1   TOTAL BILIRUBIN mg/dL  --   --  0.26   ALK PHOS U/L  --   --  57   ALT U/L  --   --  9   AST U/L  --   --  15   GLUCOSE RANDOM mg/dL 163*   < > 117    < > = values in this interval not displayed. Results from last 7 days   Lab Units 09/20/23  2255   INR  0.96     Results from last 7 days   Lab Units 09/25/23  1057 09/25/23  0711 09/24/23  2017 09/24/23  1604 09/24/23  1131 09/24/23  0711 09/23/23  2116 09/23/23  1600 09/23/23  1102 09/23/23  0732 09/22/23  2040 09/22/23  1538   POC GLUCOSE mg/dl 176* 73 127 101 254* 301* 111 114 229* 174* 177* 268*         Results from last 7 days   Lab Units 09/22/23  0437 09/20/23  2255 09/20/23  2254   LACTIC ACID mmol/L  --   --  1.7   PROCALCITONIN ng/ml 0.16 0.09  --        Lines/Drains:  Invasive Devices     Peripheral Intravenous Line  Duration           Peripheral IV 09/21/23 Right Antecubital 4 days          Drain  Duration           External Urinary Catheter 3 days                  Recent Cultures (last 7 days):   Results from last 7 days   Lab Units 09/21/23  1500 09/21/23  0340 09/20/23  2311 09/20/23  2255   BLOOD CULTURE   --   --  No Growth After 4 Days. No Growth After 4 Days.    SPUTUM CULTURE  4+ Growth of  --   --   --    GRAM STAIN RESULT  1+ Epithelial cells per low power field*  No Polys*  2+ Gram negative rods*  2+ Gram positive cocci in chains and clusters*  Rare Gram positive rods*  -- --   --    LEGIONELLA URINARY ANTIGEN   --  Negative  --   --        Last 24 Hours Medication List:   Current Facility-Administered Medications   Medication Dose Route Frequency Provider Last Rate   • acetaminophen  650 mg Oral Q6H PRN Marshal Hannon MD     • albuterol  2 puff Inhalation Q4H PRN Marshal Hannon MD     • atorvastatin  80 mg Oral Daily With Dinner Marshal Hannon MD     • furosemide  20 mg Oral BID Ham MONTE MD     • insulin glargine  26 Units Subcutaneous HS Quinton MONTE MD     • insulin lispro  1-6 Units Subcutaneous TID AC Marshal Hannon MD     • insulin lispro  8 Units Subcutaneous TID With Meals Nitesh Agudelo MD     • levothyroxine  175 mcg Oral Early Morning Marshal Hannon MD     • melatonin  3 mg Oral HS Marshal Hannon MD     • metoprolol succinate  25 mg Oral Daily Marshal Hannon MD     • mycophenolate  500 mg Oral Q12H [address] Marshal Hannon MD     • ondansetron  4 mg Intravenous Q6H PRN Marshal Hannon MD     • pantoprazole  40 mg Intravenous Q12H [address] Ham MONTE MD     • sodium chloride (PF)  3 mL Intravenous Q12H [address] Muriel Davila MD          Today, Patient Was Seen By: Nitesh Agudelo MD    **Please Note: This note may have been constructed using a voice recognition system. ** left bicep repair left bicep repair/done

## 2023-09-25 NOTE — PROGRESS NOTES
Progress Note - Pulmonary   Deandra Maggie 79 y.o. female MRN: [id number]  Unit/Bed#: -01 Encounter: [id number]    Assessment:  1. Shortness of breath  2. CT-she remains on her baseline oxygen requirement ILD recently started on CellCept  3. Severe iron deficiency anemia  4. Scleroderma  5. Chronic hypoxemic respiratory failure    Plan:  Shortness of breath in the setting of ILD, severe iron deficiency anemia  She had 1 isolated fever on presentation but has been afebrile since, infectious work-up has been negative, imaging not convincing for any respiratory infection  Procalcitonin has been normal x2, respiratory viral panel is negative  She has been followed by GI for significant drop in her hemoglobin, has several AVMs on endoscopy which were ablated  Continue to monitor off antibiotics  Continue CellCept  We will need outpatient follow-up in [facility] for her CT ILD, scleroderma and pulmonary hypertension  She is stable from pulmonary standpoint, ongoing follow-up with GI. She will follow-up with her specialist in [facility]  We will sign off, please call with questions    Subjective:   Patient resting in bed. Reports overall improvement in her symptoms. No significant shortness of breath or cough currently    Objective:     Vitals: Blood pressure 157/58, pulse 81, temperature 98.2 °F (36.8 °C), temperature source Oral, resp. rate 20, height 5' 6.5" (1.689 m), weight 101 kg (223 lb 8.7 oz), SpO2 95 %. ,Body mass index is 35.54 kg/m².       Intake/Output Summary (Last 24 hours) at 9/25/2023 0903  Last data filed at 9/25/2023 7913  Gross per 24 hour   Intake 860 ml   Output 600 ml   Net 260 ml       Invasive Devices     Peripheral Intravenous Line  Duration           Peripheral IV 09/21/23 Right Antecubital 4 days          Drain  Duration           External Urinary Catheter 2 days                Physical Exam: /58   Pulse 81   Temp 98.2 °F (36.8 °C) (Oral)   Resp 20   Ht 5' 6.5" (1.689 m)   Wt 101 kg (223 lb 8.7 oz)   SpO2 95%   BMI 35.54 kg/m²   General appearance: alert and oriented, in no acute distress  Head: Normocephalic, without obvious abnormality, atraumatic  Eyes: negative findings: conjunctivae and sclerae normal  Lungs: Bibasilar crackles  Heart: regular rate and rhythm  Abdomen: normal findings: soft, non-tender  Extremities: No edema  Skin: Warm and dry  Neurologic: Mental status: Alert, oriented, thought content appropriate     Labs: I have personally reviewed pertinent lab results. , CBC:   Lab Results   Component Value Date    WBC 5.91 09/25/2023    HGB 7.5 (L) 09/25/2023    HCT 24.0 (L) 09/25/2023    MCV 92 09/25/2023     09/25/2023    RBC 2.60 (L) 09/25/2023    MCH 28.8 09/25/2023    MCHC 31.3 (L) 09/25/2023    RDW 16.2 (H) 09/25/2023    MPV 9.9 09/25/2023   , CMP: No results found for: "SODIUM", "K", "CL", "CO2", "ANIONGAP", "BUN", "CREATININE", "GLUCOSE", "CALCIUM", "AST", "ALT", "ALKPHOS", "PROT", "BILITOT", "EGFR"  Imaging and other studies: I have personally reviewed pertinent reports.    and I have personally reviewed pertinent films in PACS

## 2023-09-25 NOTE — ASSESSMENT & PLAN NOTE
Now resolved. O2 sat well on RA. 95%  Currently she is afebrile, hemodynamically stable. Acute nontoxic-appearing. Likely multifactorial in settings of chronic interstitial lung disease as well as acute on chronic anemia causing her symptoms. Resume home dose of Lasix. He received 2 unit PRBC transfusion. Hemoglobin 7.5  Remain stable off antibiotics. Pulmonology recommendation appreciated.

## 2023-09-25 NOTE — CASE MANAGEMENT
Case Management Assessment & Discharge Planning Note    Patient name Gerda Galindo  Location /-41 MRN [id number]  : 1955 Date 2023       Current Admission Date: 2023  Current Admission Diagnosis:Acute on chronic respiratory failure with hypoxia [facility])   Patient Active Problem List    Diagnosis Date Noted   • CKD (chronic kidney disease) 2023   • CAD S/P percutaneous coronary angioplasty 2023   • IPF (idiopathic pulmonary fibrosis) ([address]) 2023   • Hypothyroid 2023   • Hypertension 2023   • Type 1 diabetes mellitus without complication ([address])    • Pneumonia 2023   • Acute on chronic respiratory failure with hypoxia ([address]) 2023   • Acute on chronic blood loss anemia 2023      LOS (days): 4  Geometric Mean LOS (GMLOS) (days): 4.50  Days to GMLOS:-0.1     OBJECTIVE:    Risk of Unplanned Readmission Score: 12.21         Current admission status: Inpatient       [facility] [address] [place] [address] Drive  Phone: [phone] Fax: [phone]    Primary Care Provider: Luke Sanchez MD    Primary Insurance: MEDICARE  Secondary Insurance: [facility]    ASSESSMENT:  [facility] Proxies    There are no active Health Care Proxies on file. Advance Directives  Does patient have a [address]?: Yes  Does patient have Advance Directives?: Yes  Advance Directives: Living will  Primary Contact: Radha Mary (Life Partner)   [phone] (Mobile)         Readmission Root Cause  30 Day Readmission: No    Patient Information  Admitted from[de-identified] Home  Mental Status: Alert  During Assessment patient was accompanied by: Not accompanied during assessment  Assessment information provided by[de-identified] Patient  Primary Caregiver: Self  Support Systems: Spouse/significant other  [place] of Residence: [address] [place] [address] do you live in?: [place], [address] entry access options. Select all that apply.: Stairs  Number of steps to enter home.: 4  Do the steps have railings?: Yes  Type of Current Residence: 2 story home  Upon entering residence, is there a bedroom on the main floor (no further steps)?: Yes  Upon entering residence, is there a bathroom on the main floor (no further steps)?: Yes  In the last 12 months, was there a time when you were not able to pay the mortgage or rent on time?: No  In the last 12 months, how many places have you lived?: 1  In the last 12 months, was there a time when you did not have a steady place to sleep or slept in a shelter (including now)?: No  Homeless/housing insecurity resource given?: N/A  Living Arrangements: Lives w/ Spouse/significant other, Other (Comment) (pt is a "snow  bird";  spends summers in [place], winter in [place];  leaving to return [facility] on 10/10)  Is patient a ?: No    Activities of Daily Living Prior to Admission  Functional Status: Independent  Completes ADLs independently?: Yes  Ambulates independently?: Yes  Does patient use assisted devices?: Yes  Assisted Devices (DME) used: Rollator, Shower Chair, Home Oxygen concentrator, Portable Oxygen concentrator, Other (Comment) (grab bars in shower and toilet;  has wh/ch and canes in [place])  Miguelito Name (respiratory supplies): vendor is in [place]  O2 Rate(s): 2L NC prn  Does patient currently own DME?: Yes  What DME does the patient currently own?: Home Oxygen concentrator, Portable Oxygen concentrator, Rollator, Shower Chair, Other (Comment) (grab bars in shower and toilet;  has wh/ch and canes in [place])  Does patient have a history of Outpatient Therapy (PT/OT)?: Yes  Does the patient have a history of Short-Term Rehab?: Yes  Does patient have a history of HHC?: Yes  Does patient currently have [address]?: No         Patient Information Continued  Income Source:  Other (Comment) (SSI plus pension)  Does patient have prescription coverage?: Yes  Within the past 12 months, you worried that your food would run out before you got the money to buy more.: Never true  Within the past 12 months, the food you bought just didn't last and you didn't have money to get more.: Never true  Food insecurity resource given?: N/A  Does patient have a history of substance abuse?: No  Does patient have a history of Mental Health Diagnosis?: No         Means of Transportation  Means of Transport to Appts[de-identified] Family transport  In the past 12 months, has lack of transportation kept you from medical appointments or from getting medications?: No  In the past 12 months, has lack of transportation kept you from meetings, work, or from getting things needed for daily living?: No  Was application for public transport provided?: N/A        DISCHARGE DETAILS:    Discharge planning discussed with[de-identified] pt  Freedom of Choice: Yes  Comments - Freedom of Choice: Met w pt, introduced self and explained role of CM, including arranging DME, STR, home health, out pt tx. Advised pt that CM will make appropriate referrals based on treatment team recommendations and MD orders, and she can consider recommended plan of care and choose from available vendors.   CM contacted family/caregiver?: No- see comments (pt alert, oriented adult)  Were Treatment Team discharge recommendations reviewed with patient/caregiver?: Yes (pt adamantly refuses rehab placement;  would be interested in home health)          Contacts  Patient Contacts: Katharinegracia Euceda (Life Partner)   [phone] (Mobile)  Relationship to Patient[de-identified] Other (Comment) (partner)    [address]          Is the patient interested in [facility] at discharge?: Yes  [facility] requested[de-identified] Occupational Therapy, Physical Therapy, [facility] Name[de-identified]  (TBD)  [address] Provider[de-identified] PCP  [place] Needed[de-identified] Diabetes Management, Evaluate Functional Status and Safety, Gait/ADL Training, Strengthening/Theraputic Exercises to Improve Function  Homebound Criteria Met[de-identified] Uses an Assist Device (i.e. cane, walker, etc), Requires the Assistance of Another Person for Safe Ambulation or to Leave the Home  Supporting Clincal Findings[de-identified] Fatigues Easliy in [employer], Limited Endurance    DME Referral Provided  Referral made for DME?: No    Other Referral/Resources/Interventions Provided:  Referral Comments: Pt lives in [place] for summer, [place] in winter. Is set to return to [place] on 10/10. Pt refusing recommendation for STR placement, but would consider home health, particulary for PT/OT. [place] referrals made;  awaiting responses.          Treatment Team Recommendation: Short Term Rehab  Discharge Destination Plan[de-identified] Home with [address] at Discharge : Family

## 2023-09-26 VITALS
BODY MASS INDEX: 35.09 KG/M2 | RESPIRATION RATE: 16 BRPM | HEART RATE: 75 BPM | WEIGHT: 223.55 LBS | SYSTOLIC BLOOD PRESSURE: 149 MMHG | DIASTOLIC BLOOD PRESSURE: 67 MMHG | OXYGEN SATURATION: 95 % | HEIGHT: 67 IN | TEMPERATURE: 97.7 F

## 2023-09-26 LAB
BACTERIA BLD CULT: NORMAL
BACTERIA BLD CULT: NORMAL
ERYTHROCYTE [DISTWIDTH] IN BLOOD BY AUTOMATED COUNT: 16.3 % (ref 11.6–15.1)
GLUCOSE SERPL-MCNC: 244 MG/DL (ref 65–140)
GLUCOSE SERPL-MCNC: 98 MG/DL (ref 65–140)
HCT VFR BLD AUTO: 24.2 % (ref 34.8–46.1)
HGB BLD-MCNC: 7.6 G/DL (ref 11.5–15.4)
MCH RBC QN AUTO: 29 PG (ref 26.8–34.3)
MCHC RBC AUTO-ENTMCNC: 31.4 G/DL (ref 31.4–37.4)
MCV RBC AUTO: 92 FL (ref 82–98)
PLATELET # BLD AUTO: 325 THOUSANDS/UL (ref 149–390)
PMV BLD AUTO: 9.6 FL (ref 8.9–12.7)
RBC # BLD AUTO: 2.62 MILLION/UL (ref 3.81–5.12)
WBC # BLD AUTO: 5.26 THOUSAND/UL (ref 4.31–10.16)

## 2023-09-26 PROCEDURE — C9113 INJ PANTOPRAZOLE SODIUM, VIA: HCPCS | Performed by: STUDENT IN AN ORGANIZED HEALTH CARE EDUCATION/TRAINING PROGRAM

## 2023-09-26 PROCEDURE — 99239 HOSP IP/OBS DSCHRG MGMT >30: CPT | Performed by: STUDENT IN AN ORGANIZED HEALTH CARE EDUCATION/TRAINING PROGRAM

## 2023-09-26 PROCEDURE — 82948 REAGENT STRIP/BLOOD GLUCOSE: CPT

## 2023-09-26 PROCEDURE — 85027 COMPLETE CBC AUTOMATED: CPT | Performed by: STUDENT IN AN ORGANIZED HEALTH CARE EDUCATION/TRAINING PROGRAM

## 2023-09-26 RX ORDER — INSULIN GLARGINE 100 [IU]/ML
26 INJECTION, SOLUTION SUBCUTANEOUS
Qty: 10 ML | Refills: 0
Start: 2023-09-26 | End: 2023-09-26

## 2023-09-26 RX ORDER — FERROUS SULFATE TAB EC 324 MG (65 MG FE EQUIVALENT) 324 (65 FE) MG
324 TABLET DELAYED RESPONSE ORAL
Qty: 60 TABLET | Refills: 0 | Status: SHIPPED | OUTPATIENT
Start: 2023-09-26

## 2023-09-26 RX ORDER — INSULIN LISPRO 100 [IU]/ML
8 INJECTION, SOLUTION INTRAVENOUS; SUBCUTANEOUS
Refills: 0
Start: 2023-09-26 | End: 2023-09-26

## 2023-09-26 RX ORDER — DOCUSATE SODIUM 100 MG/1
100 CAPSULE, LIQUID FILLED ORAL 2 TIMES DAILY PRN
Qty: 60 CAPSULE | Refills: 0 | Status: SHIPPED | OUTPATIENT
Start: 2023-09-26

## 2023-09-26 RX ORDER — PANTOPRAZOLE SODIUM 40 MG/1
40 TABLET, DELAYED RELEASE ORAL 2 TIMES DAILY
Qty: 60 TABLET | Refills: 0 | Status: SHIPPED | OUTPATIENT
Start: 2023-09-26

## 2023-09-26 RX ORDER — METOPROLOL SUCCINATE 25 MG/1
25 TABLET, EXTENDED RELEASE ORAL DAILY
Refills: 0
Start: 2023-09-27 | End: 2023-09-26

## 2023-09-26 RX ORDER — FUROSEMIDE 20 MG/1
20 TABLET ORAL 2 TIMES DAILY
Qty: 60 TABLET | Refills: 0 | Status: SHIPPED | OUTPATIENT
Start: 2023-09-26

## 2023-09-26 RX ADMIN — FUROSEMIDE 20 MG: 20 TABLET ORAL at 09:04

## 2023-09-26 RX ADMIN — MYCOPHENOLATE MOFETIL 500 MG: 250 CAPSULE ORAL at 09:04

## 2023-09-26 RX ADMIN — METOPROLOL SUCCINATE 25 MG: 25 TABLET, EXTENDED RELEASE ORAL at 09:04

## 2023-09-26 RX ADMIN — SODIUM CHLORIDE, PRESERVATIVE FREE 3 ML: 5 INJECTION INTRAVENOUS at 08:53

## 2023-09-26 RX ADMIN — PANTOPRAZOLE SODIUM 40 MG: 40 INJECTION, POWDER, FOR SOLUTION INTRAVENOUS at 09:04

## 2023-09-26 RX ADMIN — LEVOTHYROXINE SODIUM 175 MCG: 25 TABLET ORAL at 05:57

## 2023-09-26 RX ADMIN — INSULIN LISPRO 8 UNITS: 100 INJECTION, SOLUTION INTRAVENOUS; SUBCUTANEOUS at 11:46

## 2023-09-26 RX ADMIN — INSULIN LISPRO 3 UNITS: 100 INJECTION, SOLUTION INTRAVENOUS; SUBCUTANEOUS at 11:45

## 2023-09-26 NOTE — CASE MANAGEMENT
Case Management Discharge Planning Note    Patient name Rosalba Lucas  Location /-41 MRN [id number]  : 1955 Date 2023       Current Admission Date: 2023  Current Admission Diagnosis:Acute on chronic respiratory failure with hypoxia [facility])   Patient Active Problem List    Diagnosis Date Noted   • CKD (chronic kidney disease) 2023   • CAD S/P percutaneous coronary angioplasty 2023   • IPF (idiopathic pulmonary fibrosis) ([address]) 2023   • Hypothyroid 2023   • Hypertension 2023   • Type 1 diabetes mellitus without complication ([address])    • Pneumonia 2023   • Acute on chronic respiratory failure with hypoxia ([address]) 2023   • Acute on chronic blood loss anemia 2023      LOS (days): 5  Geometric Mean LOS (GMLOS) (days): 4.50  Days to GMLOS:-0.9     OBJECTIVE:  Risk of Unplanned Readmission Score: 10.9         Current admission status: Inpatient   [facility] [address] [place] [address] Drive  Phone: [phone] Fax: [phone]    Primary Care Provider: Hans Valencia MD    Primary Insurance: MEDICARE  Secondary Insurance: [facility]    DISCHARGE DETAILS:    [facility]         Is the patient interested in [facility] at discharge?: Yes  [facility] requested[de-identified] Occupational Therapy, Physical Therapy, [facility] Name[de-identified] [facility] External Referral Reason (only applicable if external HHA name selected): Patient has established relationship with provider  [facility] Provider[de-identified] PCP  Home Health Services Needed[de-identified] Diabetes Management, Evaluate Functional Status and Safety, Gait/ADL Training, Strengthening/Theraputic Exercises to Improve Function  Homebound Criteria Met[de-identified] Uses an Assist Device (i.e. cane, walker, etc), Requires the Assistance of Another Person for Safe Ambulation or to Leave the Home  Supporting Clincal Findings[de-identified] Fatigues Easliy in [employer], Limited Endurance    DME Referral Provided  Referral made for DME?: No

## 2023-09-26 NOTE — DISCHARGE INSTR - AVS FIRST PAGE
Recommend close outpatient follow-up with primary care provider after discharge. Recommended to have repeat CBC with primary care provider in 2 weeks to trend hemoglobin level. Recommended to discontinue taking Plavix. Recommended to HOLD aspirin for now until seen by primary care provider and have repeat CBC to ensure hemoglobin remained stable prior to restarting aspirin.

## 2023-09-26 NOTE — DISCHARGE SUMMARY
[address]  Discharge- [place] 1955, 79 y.o. female MRN: [id number]  Unit/Bed#: [facility] [place] Encounter: [id number]  Primary Care Provider: Keely Rivera MD   Date and time admitted to hospital: 9/20/2023 10:32 PM    * Acute on chronic respiratory failure with hypoxia [facility])  Assessment & Plan  Now resolved. O2 sat well on RA. 95%  Currently she is afebrile, hemodynamically stable. Acute nontoxic-appearing. Likely multifactorial in settings of chronic interstitial lung disease as well as acute on chronic anemia causing her symptoms. Currently symptoms are now resolved. Oral surgeon 95 to 97% on room air. Resume home dose of Lasix. He received 2 unit PRBC transfusion. Hemoglobin 7.5  Remain stable off antibiotics. Recommend close outpatient follow-up with primary care provider after going back to [place]  Patient has rescheduled flights to earlier flights and now going to [place] on 10/10/2023. Acute on chronic blood loss anemia  Assessment & Plan  Patient had EGD yesterday noted with multiple AVMs in duodenum and proximal jejunum, some of them were treated with APC. Currently she is hemodynamically stable. Currently hemoglobin 7.5 after receiving 2 unit PRBC transfusion. Some melanotic stool expected due to moderate amount of blood noted in small bowel on EGD with push enteroscopy. Patient was notable to tolerate octreotide due to nausea and vomiting therefore discontinued. Cleared by GI for discharge. Discharge on p.o. Protonix 40 mg twice daily. Also started on iron supplement with Colace on discharge. Discontinue Plavix. Hold aspirin for now until repeat CBC to ensure stable hemoglobin with PCP. Continue outpatient follow-up with primary providers in [place].       CKD (chronic kidney disease)  Assessment & Plan  Lab Results   Component Value Date    EGFR 44 09/23/2023    EGFR 36 09/22/2023    EGFR 43 09/20/2023    CREATININE 1.26 09/23/2023 CREATININE 1.48 (H) 09/22/2023    CREATININE 1.29 09/20/2023     Appears to have CKD with baseline creatinine around 1.2 range. Patient did receive p.o. Lasix as well as IV dose of Lasix yesterday after transfusion to prevent volume overload however now noted with elevated BUN/creatinine. Did get CT chest with contrast on 09/22/2023  Creatinine improved to 1.26 at baseline. Resume home dose of Lasix. Outpatient follow-up with PCP for repeat blood work. Pneumonia  Assessment & Plan  Patient presented with fever, shortness of breath, chest discomfort since 1 day prior to presentation. Fever noted to be 101 on presentation. Noted to have tachypnea. Imaging showing increased interstitial marking suspicion for superimposed infection. Pneumonia ruled out. Procalcitonin negative x2. Remains stable off antibiotics. Type 1 diabetes mellitus without complication (HCC)  Assessment & Plan  A1c 6.6 on 09/18/2023 per care everywhere. Controlled. Taking basal insulin 26 units daily along with lispro average of 8 units with meals. Resume home dose of long-acting insulin 26 units at bedtime along with lispro 8 units with meals can titrate as needed  Outpatient follow-up with PCP. Hypertension  Assessment & Plan  Continue lisinopril, metoprolol. Hypothyroid  Assessment & Plan  Continue levothyroxine. Sees endocrinology in [place]. IPF (idiopathic pulmonary fibrosis) ([facility])  Assessment & Plan  History of IPF with pulmonary artery hypertension. Currently on CellCept. Does continue to follow-up with following up with pulmonary in [place]. Pulmonology recommendation appreciated. CAD S/P percutaneous coronary angioplasty  Assessment & Plan  On aspirin, Plavix, statin, metoprolol, lisinopril. Last intervention was 2019. Discontinue Plavix due to recurrent anemia requiring blood transfusion from AVMs.   Hold aspirin for now until repeat CBC with primary care provider to ensure stable hemoglobin. Resume rest of the medication glipizide, metoprolol, lisinopril. Also recommend outpatient follow-up with primary cardiology in [place] since making changes to her regimen as above. Patient and significant other both are in agreement with above plan. Medical Problems     Resolved Problems  Date Reviewed: 9/26/2023   None       Discharging Physician / Practitioner: Ella Sharpe MD  PCP: Johnathon Motta MD  Admission Date:   Admission Orders (From admission, onward)     Ordered        09/21/23 0350  Inpatient Admission  Once            09/21/23 0032  Place in Observation  Once,   Status:  Canceled                      Discharge Date: 09/26/23    Consultations During Hospital Stay:  · Gastroenterology    Procedures Performed:   · EGD with push enteroscopy    Significant Findings / Test Results:   · EGD with push enteroscopy noted with hiatal hernia, 10 angiectasia in duodenum and jejunum transposed-plasma coagulation, also noted with moderate amount of blood in the small bowel. Reason for Admission: Acute on chronic respiratory failure with hypoxia likely multifactorial in settings of interstitial lung disease, acute on chronic anemia    Hospital Course:     Carlitos Cordero is a 79 y.o. female patient with past medical history of interstitial lung disease on chronic home O2 dependent 2 L at baseline, history of scleroderma currently on CellCept, diabetes, pulmonary hypertension, Raynaud's, CAD PCI/stent to LAD in 2019, negative stress test in 10/2022 hypothyroidism, chronic anemia from AVM requiring transfusion in the past, history of right TMA, patient primary lives in [place] and has all her primary providers in [place]. Who originally presented to the hospital on 9/20/2023 due to complaining of shortness of breath, fatigue, overall feeling well, had episode of fever prior to presentation. Denies any other complaints.   Further evaluation noted with acute on chronic anemia with hemoglobin downtrending to 5.4. Patient does report having melanotic bowel movement. Patient's overall symptomology likely multifactorial in settings of interstitial lung disease as well as acute on chronic anemia. Patient was seen by GI and had EGD with push enteroscopy with this admission noted with multiple AVMs, some blood in duodenum. AVM was treated during EGD. Currently hemoglobin stable 7.5 after receiving 2 unit PRBC transfusion on this admission. Plavix discontinued due to AVMs, anemia requiring transfusions. Recommended to hold aspirin for now until repeat CBC with primary care provider 2 weeks to ensure hemoglobin stable as well as recommend outpatient follow-up with primary cardiology in [place] since making above changes to her regimen. Patient was also seen by pulmonology recommend to continue current regimen with outpatient follow-up with primary pulmonology in [place]. Patient was seen by PT OT recommended postacute rehab however patient is declining to go to rehab since she will be going back to [place] with significant other. Patient has rearranged her flights to go to [place] early and now she is flying on 10/10/2023. No other events reported. Currently she is hemodynamically stable for discharge with outpatient follow-up with primary providers. Patient is agreeable with above plan. Significant other Vale Mc is also agreeable with above plan. No other events reported. Please see above list of diagnoses and related plan for additional information. Condition at Discharge: good    Discharge Day Visit / Exam:   Subjective: Seen during AM rounds. Patient denies having further episode of melanotic bowel movement. Hemoglobin stable around 7.5 range which is appropriately adjusting after receiving 2 unit PRBC transfusion on this admission. No other events reported. She reports overall feeling well and eager to go home.     Vitals: Blood Pressure: 149/67 (09/26/23 0654)  Pulse: 75 (09/26/23 0654)  Temperature: 97.7 °F (36.5 °C) (09/26/23 0654)  Temp Source: Oral (09/26/23 0654)  Respirations: 16 (09/26/23 0654)  Height: 5' 6.5" (168.9 cm) (09/20/23 2235)  Weight - Scale: 101 kg (223 lb 8.7 oz) (09/20/23 2235)  SpO2: 95 % (09/26/23 0900)  Exam:   Physical Exam  Constitutional:       General: She is not in acute distress. Appearance: Normal appearance. She is not ill-appearing, toxic-appearing or diaphoretic. HENT:      Head: Normocephalic and atraumatic. Eyes:      Pupils: Pupils are equal, round, and reactive to light. Cardiovascular:      Rate and Rhythm: Normal rate. Pulses: Normal pulses. Pulmonary:      Effort: Pulmonary effort is normal. No respiratory distress. Breath sounds: Normal breath sounds. No wheezing. Abdominal:      General: Bowel sounds are normal. There is no distension. Tenderness: There is no abdominal tenderness. Musculoskeletal:      Right lower leg: No edema. Left lower leg: No edema. Comments: History of right TMA. Neurological:      Mental Status: She is alert and oriented to person, place, and time. Psychiatric:         Mood and Affect: Mood normal.         Behavior: Behavior normal.          Discussion with Family: Updated  (significant other) at bedside. Discharge instructions/Information to patient and family:   See after visit summary for information provided to patient and family. Provisions for Follow-Up Care:  See after visit summary for information related to follow-up care and any pertinent home health orders. Disposition:   Other: Patient declined to go to rehab. Reports that she will be going to [place] with significant other. Planned Readmission:      Discharge Statement:  I spent 35 minutes discharging the patient. This time was spent on the day of discharge. I had direct contact with the patient on the day of discharge.  Greater than 50% of the total time was spent examining patient, answering all patient questions, arranging and discussing plan of care with patient as well as directly providing post-discharge instructions. Additional time then spent on discharge activities. Discharge Medications:  See after visit summary for reconciled discharge medications provided to patient and/or family.       **Please Note: This note may have been constructed using a voice recognition system**

## 2023-09-27 NOTE — CASE MANAGEMENT
Case Management Progress Note    Patient name Stacey Landeros  Location /-09 MRN [id number]  : 1955 Date 2023       LOS (days): 5  Geometric Mean LOS (GMLOS) (days): 4.50  Days to GMLOS:-1        OBJECTIVE:        Current admission status: Inpatient  Preferred Pharmacy:   [facility] [address] [place] [address] Colontaniya Villafuerte  [address] [place] [address] [address]  Phone: [phone] Fax: [phone]    Primary Care Provider: Amber Watson MD    Primary Insurance: MEDICARE  Secondary Insurance: [employer]    PROGRESS NOTE:  Late Entry-  2023  [facility] is no longer able to see patient due to time frame of visits and patient's intent to return to [place]. CM contacted patient , who states she plans to use services  with [facility], who had committed to the plan of seeing her in both states. [facility] Liaison Angelica updated and [facility] reserved in [place].

## 2023-09-29 ENCOUNTER — TELEPHONE (OUTPATIENT)
Age: 68
End: 2023-09-29

## 2023-09-29 NOTE — TELEPHONE ENCOUNTER
Patient came in for her appointment 20 minutes late. When staff at the front reception area told her she was out of her window to be seen patient and her  both became aggressive and belligerent. They were telling staff to call Antionette Fuentes, their nurse from [facility]. Patient's  asked to speak with me in which he stated his wife's appointment was rescheduled multiple times; we had an office emergency yesterday which required no patients in the office. He stated her appointment was at 3:20, it was scheduled for 2:40. Patient and  stated that staff was incompetent and that they just drove all the way here from [facility]. I apologized, removed myself to ask the providers if they were available; Jose Francisco Valdes and  were willing to make accommodations to see the patient. By time I got back up front, patient and her  left the office and staff was clearly very distraught due to what was said to them while I was gone and patient was not seen due to leaving.

## 2023-09-29 NOTE — TELEPHONE ENCOUNTER
Lizz called from [employer] stating that patient called her very upset stating that our office won't see her. When I explained the totality of the situation; the nurse apologized. I also explained the  was not happy with commuting to [place] for the appointment. I provided Elizslime Sterling's phone number, however explained we will see the patient here if they are willing to come back but inappropriate behavior will not be tolerated.

## 2023-11-10 ENCOUNTER — APPOINTMENT (RX ONLY)
Dept: URBAN - METROPOLITAN AREA CLINIC 128 | Facility: CLINIC | Age: 68
Setting detail: DERMATOLOGY
End: 2023-11-10

## 2023-11-10 DIAGNOSIS — L81.4 OTHER MELANIN HYPERPIGMENTATION: ICD-10-CM

## 2023-11-10 DIAGNOSIS — L57.8 OTHER SKIN CHANGES DUE TO CHRONIC EXPOSURE TO NONIONIZING RADIATION: ICD-10-CM | Status: INADEQUATELY CONTROLLED

## 2023-11-10 DIAGNOSIS — Z71.89 OTHER SPECIFIED COUNSELING: ICD-10-CM

## 2023-11-10 DIAGNOSIS — D18.0 HEMANGIOMA: ICD-10-CM

## 2023-11-10 DIAGNOSIS — D22 MELANOCYTIC NEVI: ICD-10-CM

## 2023-11-10 DIAGNOSIS — L82.0 INFLAMED SEBORRHEIC KERATOSIS: ICD-10-CM | Status: INADEQUATELY CONTROLLED

## 2023-11-10 DIAGNOSIS — L82.1 OTHER SEBORRHEIC KERATOSIS: ICD-10-CM

## 2023-11-10 DIAGNOSIS — L72.0 EPIDERMAL CYST: ICD-10-CM

## 2023-11-10 PROBLEM — D22.5 MELANOCYTIC NEVI OF TRUNK: Status: ACTIVE | Noted: 2023-11-10

## 2023-11-10 PROBLEM — D18.01 HEMANGIOMA OF SKIN AND SUBCUTANEOUS TISSUE: Status: ACTIVE | Noted: 2023-11-10

## 2023-11-10 PROCEDURE — ? DEFER

## 2023-11-10 PROCEDURE — 99213 OFFICE O/P EST LOW 20 MIN: CPT | Mod: 25

## 2023-11-10 PROCEDURE — ? LIQUID NITROGEN

## 2023-11-10 PROCEDURE — ? COUNSELING

## 2023-11-10 PROCEDURE — 17110 DESTRUCTION B9 LES UP TO 14: CPT

## 2023-11-10 ASSESSMENT — LOCATION SIMPLE DESCRIPTION DERM
LOCATION SIMPLE: UPPER BACK
LOCATION SIMPLE: LEFT CHEEK
LOCATION SIMPLE: ABDOMEN

## 2023-11-10 ASSESSMENT — LOCATION DETAILED DESCRIPTION DERM
LOCATION DETAILED: SUPERIOR THORACIC SPINE
LOCATION DETAILED: SUBXIPHOID
LOCATION DETAILED: LEFT INFERIOR CENTRAL MALAR CHEEK
LOCATION DETAILED: INFERIOR THORACIC SPINE

## 2023-11-10 ASSESSMENT — LOCATION ZONE DERM
LOCATION ZONE: TRUNK
LOCATION ZONE: FACE

## 2023-11-10 NOTE — PROCEDURE: DEFER
Introduction Text (Please End With A Colon): )
Detail Level: Detailed
Size Of Lesion In Cm (Optional): 1
X Size Of Lesion In Cm (Optional): 0
Procedure To Be Performed At Next Visit: Excision
Instructions (Optional): Pt to schedule E&S if she desires removal with [facility].

## 2023-11-10 NOTE — PROCEDURE: LIQUID NITROGEN
Show Aperture Variable?: Yes
Include Z78.9 (Other Specified Conditions Influencing Health Status) As An Associated Diagnosis?: No
Medical Necessity Information: It is in your best interest to select a reason for this procedure from the list below. All of these items fulfill various CMS LCD requirements except the new and changing color options.
Medical Necessity Clause: This procedure was medically necessary because the lesions that were treated were:
Post-Care Instructions: I reviewed with the patient in detail post-care instructions. Patient is to wear sunprotection, and avoid picking at any of the treated lesions. Pt may apply Vaseline to crusted or scabbing areas.
Detail Level: Detailed
Consent: The patient's consent was obtained including but not limited to risks of crusting, scabbing, blistering, scarring, darker or lighter pigmentary change, recurrence, incomplete removal and infection.
Spray Paint Text: The liquid nitrogen was applied to the skin utilizing a spray paint frosting technique.

## 2024-11-12 ENCOUNTER — APPOINTMENT (RX ONLY)
Dept: URBAN - METROPOLITAN AREA CLINIC 128 | Facility: CLINIC | Age: 69
Setting detail: DERMATOLOGY
End: 2024-11-12

## 2024-11-12 DIAGNOSIS — L81.4 OTHER MELANIN HYPERPIGMENTATION: ICD-10-CM

## 2024-11-12 DIAGNOSIS — L82.0 INFLAMED SEBORRHEIC KERATOSIS: ICD-10-CM

## 2024-11-12 DIAGNOSIS — Z71.89 OTHER SPECIFIED COUNSELING: ICD-10-CM

## 2024-11-12 DIAGNOSIS — L57.8 OTHER SKIN CHANGES DUE TO CHRONIC EXPOSURE TO NONIONIZING RADIATION: ICD-10-CM | Status: INADEQUATELY CONTROLLED

## 2024-11-12 DIAGNOSIS — D22 MELANOCYTIC NEVI: ICD-10-CM

## 2024-11-12 DIAGNOSIS — L65.9 NONSCARRING HAIR LOSS, UNSPECIFIED: ICD-10-CM

## 2024-11-12 DIAGNOSIS — L82.1 OTHER SEBORRHEIC KERATOSIS: ICD-10-CM

## 2024-11-12 DIAGNOSIS — D18.0 HEMANGIOMA: ICD-10-CM

## 2024-11-12 DIAGNOSIS — L56.5 DISSEMINATED SUPERFICIAL ACTINIC POROKERATOSIS (DSAP): ICD-10-CM

## 2024-11-12 PROBLEM — D18.01 HEMANGIOMA OF SKIN AND SUBCUTANEOUS TISSUE: Status: ACTIVE | Noted: 2024-11-12

## 2024-11-12 PROBLEM — D22.5 MELANOCYTIC NEVI OF TRUNK: Status: ACTIVE | Noted: 2024-11-12

## 2024-11-12 PROCEDURE — ? PRESCRIPTION

## 2024-11-12 PROCEDURE — ? COUNSELING

## 2024-11-12 PROCEDURE — 17110 DESTRUCTION B9 LES UP TO 14: CPT

## 2024-11-12 PROCEDURE — ? LIQUID NITROGEN

## 2024-11-12 PROCEDURE — ? PRESCRIPTION MEDICATION MANAGEMENT

## 2024-11-12 PROCEDURE — 99214 OFFICE O/P EST MOD 30 MIN: CPT | Mod: 25

## 2024-11-12 PROCEDURE — 17000 DESTRUCT PREMALG LESION: CPT | Mod: 59

## 2024-11-12 RX ORDER — PHARMACY COMPOUNDING ACCESSORY
EACH MISCELLANEOUS QD
Qty: 60 | Refills: 3 | Status: ERX | COMMUNITY
Start: 2024-11-12

## 2024-11-12 RX ADMIN — Medication: at 00:00

## 2024-11-12 ASSESSMENT — LOCATION DETAILED DESCRIPTION DERM
LOCATION DETAILED: MID-FRONTAL SCALP
LOCATION DETAILED: INFERIOR THORACIC SPINE
LOCATION DETAILED: LEFT POSTERIOR LATERAL PROXIMAL THIGH
LOCATION DETAILED: RIGHT INFERIOR LATERAL FOREHEAD
LOCATION DETAILED: LEFT MEDIAL MALAR CHEEK
LOCATION DETAILED: POSTERIOR MID-PARIETAL SCALP
LOCATION DETAILED: LEFT SUPERIOR LATERAL FOREHEAD
LOCATION DETAILED: LEFT LATERAL FOREHEAD
LOCATION DETAILED: LEFT DISTAL PRETIBIAL REGION
LOCATION DETAILED: SUPERIOR THORACIC SPINE
LOCATION DETAILED: RIGHT LATERAL SUBMANDIBULAR CHEEK

## 2024-11-12 ASSESSMENT — LOCATION SIMPLE DESCRIPTION DERM
LOCATION SIMPLE: LEFT THIGH
LOCATION SIMPLE: UPPER BACK
LOCATION SIMPLE: RIGHT FOREHEAD
LOCATION SIMPLE: LEFT CHEEK
LOCATION SIMPLE: LEFT PRETIBIAL REGION
LOCATION SIMPLE: RIGHT CHEEK
LOCATION SIMPLE: LEFT FOREHEAD
LOCATION SIMPLE: POSTERIOR SCALP
LOCATION SIMPLE: ANTERIOR SCALP

## 2024-11-12 ASSESSMENT — LOCATION ZONE DERM
LOCATION ZONE: SCALP
LOCATION ZONE: FACE
LOCATION ZONE: TRUNK
LOCATION ZONE: LEG

## 2024-11-12 NOTE — PROCEDURE: PRESCRIPTION MEDICATION MANAGEMENT
Initiate Treatment: Guruji Hormonic Hair Solution with  minoxidil USP 7% & Finasteride USP 0.05% Tretinoin USP 0.015% and Bimatoprost USP 0.03%: Apply to AA or hair loss on scalp QD
Render In Strict Bullet Format?: No
Detail Level: Zone

## 2024-11-12 NOTE — PROCEDURE: MIPS QUALITY
Detail Level: Detailed
Quality 226: Preventive Care And Screening: Tobacco Use: Screening And Cessation Intervention: Patient screened for tobacco use and is an ex/non-smoker
Name And Contact Information For Health Care Proxy: Wiliam Armas [phone]
Quality 47: Advance Care Plan: Advance Care Planning discussed and documented; advance care plan or surrogate decision maker documented in the medical record.

## 2024-11-12 NOTE — PROCEDURE: LIQUID NITROGEN
Show Applicator Variable?: Yes
Post-Care Instructions: I reviewed with the patient in detail post-care instructions. Patient is to wear sunprotection, and avoid picking at any of the treated lesions. Pt may apply Vaseline to crusted or scabbing areas.
Detail Level: Detailed
Aperture Size (Optional): C
Application Tool (Optional): Cry-AC
Duration Of Freeze Thaw-Cycle (Seconds): 3
Number Of Freeze-Thaw Cycles: 1 freeze-thaw cycle
Render Note In Bullet Format When Appropriate: No
Consent: The patient's consent was obtained including but not limited to risks of crusting, scabbing, blistering, scarring, darker or lighter pigmentary change, recurrence, incomplete removal and infection.
Medical Necessity Clause: This procedure was medically necessary because the lesions that were treated were:
Medical Necessity Information: It is in your best interest to select a reason for this procedure from the list below. All of these items fulfill various CMS LCD requirements except the new and changing color options.
Spray Paint Text: The liquid nitrogen was applied to the skin utilizing a spray paint frosting technique.

## 2024-11-19 ENCOUNTER — APPOINTMENT (RX ONLY)
Dept: URBAN - METROPOLITAN AREA CLINIC 128 | Facility: CLINIC | Age: 69
Setting detail: DERMATOLOGY
End: 2024-11-19

## 2024-11-19 DIAGNOSIS — L81.4 OTHER MELANIN HYPERPIGMENTATION: ICD-10-CM

## 2024-11-19 DIAGNOSIS — L72.0 EPIDERMAL CYST: ICD-10-CM | Status: INADEQUATELY CONTROLLED

## 2024-11-19 DIAGNOSIS — Z71.89 OTHER SPECIFIED COUNSELING: ICD-10-CM

## 2024-11-19 PROCEDURE — ? COUNSELING

## 2024-11-19 PROCEDURE — 10060 I&D ABSCESS SIMPLE/SINGLE: CPT | Mod: 79

## 2024-11-19 PROCEDURE — ? INCISION AND DRAINAGE

## 2024-11-19 PROCEDURE — ? PRESCRIPTION

## 2024-11-19 PROCEDURE — ? PRESCRIPTION MEDICATION MANAGEMENT

## 2024-11-19 PROCEDURE — 99212 OFFICE O/P EST SF 10 MIN: CPT | Mod: 24,25

## 2024-11-19 RX ORDER — MUPIROCIN 20 MG/G
OINTMENT TOPICAL
Qty: 22 | Refills: 1 | Status: ERX | COMMUNITY
Start: 2024-11-19

## 2024-11-19 RX ADMIN — MUPIROCIN: 20 OINTMENT TOPICAL at 00:00

## 2024-11-19 ASSESSMENT — LOCATION SIMPLE DESCRIPTION DERM
LOCATION SIMPLE: LEFT BREAST
LOCATION SIMPLE: UPPER BACK
LOCATION SIMPLE: ABDOMEN

## 2024-11-19 ASSESSMENT — LOCATION DETAILED DESCRIPTION DERM
LOCATION DETAILED: LEFT INFRAMAMMARY CREASE (INNER QUADRANT)
LOCATION DETAILED: INFERIOR THORACIC SPINE
LOCATION DETAILED: SUPERIOR THORACIC SPINE
LOCATION DETAILED: SUBXIPHOID

## 2024-11-19 ASSESSMENT — LOCATION ZONE DERM: LOCATION ZONE: TRUNK

## 2024-11-19 NOTE — PROCEDURE: MIPS QUALITY
Detail Level: Detailed
Quality 226: Preventive Care And Screening: Tobacco Use: Screening And Cessation Intervention: Patient screened for tobacco use and is an ex/non-smoker
Name And Contact Information For Health Care Proxy: Wiliam Armas [phone]
Quality 47: Advance Care Plan: Advance Care Planning discussed and documented; advance care plan or surrogate decision maker documented in the medical record.

## 2024-11-19 NOTE — PROCEDURE: INCISION AND DRAINAGE
Detail Level: Detailed
Lesion Type: Abscess
Method: lancet
Curette: Yes
Include Sutures?: No
Anesthesia Type: 2% lidocaine with epinephrine
Anesthesia Volume In Cc: 1
Size Of Lesion In Cm (Optional But May Be Required For Some Insurances): 1.5
Drainage Amount?: moderate
Drainage Type?: bloody and cyst-like
Wound Care: Petrolatum
Dressing: dry sterile dressing
Epidermal Sutures: 4-0 Ethilon
Epidermal Closure: simple interrupted
Suture Text: The incision was partially closed with
Preparation Text: The area was prepped in the usual clean fashion.
Curette Text (Optional): After the contents were expressed a curette was used to partially remove the cyst wall.
Consent was obtained and risks were reviewed including but not limited to delayed wound healing, infection, need for multiple I and D's, and pain.
Post-Care Instructions: I reviewed with the patient in detail post-care instructions. Patient should keep wound covered and call the office should any redness, pain, swelling or worsening occur.

## 2025-07-12 NOTE — HPI: SKIN LESION
Onset: 7/11/25 1900    Location / description: Pt states she has intermittent mid lower abdominal pain. Pt has pain in her bottom, pt had BM today.  Pt states she is concerned she may be pregnant.    Precipitating Factors: Not Assessed    Pain Scale (0 - 10), 10 highest: 10/10 lower abdominal pain every 2 minutes    What improves/worsens symptoms: denies    Symptom specific medications: denies    Temperature (route and time): Not Assessed    Recent visits (last 3-4 weeks) for same reason or recent surgery:  no/no    Reproductive History   LMP: October Pt feels as if she is going through     Contraception: No    Pregnant:   Unsure    Breastfeeding:  No    PLAN:  Go to the Emergency Department or call 911.    Patient/Caller agrees to follow recommendations.    _______________________________________        Reason for Disposition   [1] SEVERE pain (e.g., excruciating) AND [2] present > 1 hour    Protocols used: Abdominal Pain - Female-A-AH    
What Type Of Note Output Would You Prefer (Optional)?: Standard Output
How Severe Is Your Skin Lesion?: moderate
Has Your Skin Lesion Been Treated?: not been treated
Is This A New Presentation, Or A Follow-Up?: Growth
Additional History: Pt has hx of porokeratoses.